# Patient Record
Sex: FEMALE | Race: WHITE | NOT HISPANIC OR LATINO | Employment: OTHER | URBAN - METROPOLITAN AREA
[De-identification: names, ages, dates, MRNs, and addresses within clinical notes are randomized per-mention and may not be internally consistent; named-entity substitution may affect disease eponyms.]

---

## 2023-04-26 LAB — HEPATITIS B VIRUS SURFACE AG PRESENCE IN SERUM: NONREACTIVE

## 2023-04-28 LAB
NIL(NEG) CONTROL SPOT COUNT: NORMAL
PANEL A SPOT COUNT: 0
PANEL B SPOT COUNT: 0
POS CONTROL SPOT COUNT: NORMAL
T-SPOT. TB INTERPRETATION: NEGATIVE

## 2023-05-01 LAB — TPMT ENZYME: 22.4

## 2023-05-05 LAB
EJ (GLYCYL-TRNA SYNTHETASE) ANTIBODY: NEGATIVE
FIBRILLARIN (U3 RNP) AB, IGG: NEGATIVE
JO-1 (HISTIDYL-TRNA SYNTHETASE) AB, IGG: 1 AU/ML (ref 0–40)
KU ANTIBODY: POSITIVE
MDA5 (CADM-140) AB: NEGATIVE
MI-2 (NUCLEAR HELICASE PROTEIN) ANTIBODY: NEGATIVE
MYOSITIS PANEL INTERPRETIVE DATA: ABNORMAL
NXP2 (NUCLEAR MATRIX PROTEIN-2) AB: NEGATIVE
OJ (ISOLEUCYL-TRNA SYNTHETASE) ANTIBODY: NEGATIVE
P155/140 ANTIBODY: NEGATIVE
PL-12 (ALANYL-TRNA SYNTHETASE) ANTIBODY: NEGATIVE
PL-7 (THREONYL-TRNA SYNTHETASE) ANTIBODY: NEGATIVE
PM/SCL 100 ANTIBODY, IGG: NEGATIVE
SAE1 (SUMO ACTIVATING ENZYME) AB: NEGATIVE
SMITH/RNP (ENA) AB, IGG: 2 UNITS (ref 0–19)
SRP (SIGNAL RECOGNITION PARTICLE) AB: NEGATIVE
SSA-52 (RO52) (ENA) ANTIBODY, IGG: 2 AU/ML (ref 0–40)
SSA-60 (RO60) (ENA) ANTIBODY, IGG: 0 AU/ML (ref 0–40)
TIF-1 GAMMA (155 KDA) AB: NEGATIVE

## 2023-05-17 ENCOUNTER — HOSPITAL ENCOUNTER (OUTPATIENT)
Dept: DATA CONVERSION | Facility: HOSPITAL | Age: 73
End: 2023-05-17
Attending: SURGERY | Admitting: SURGERY
Payer: COMMERCIAL

## 2023-05-17 DIAGNOSIS — M79.10 MYALGIA, UNSPECIFIED SITE: ICD-10-CM

## 2023-05-17 DIAGNOSIS — Z79.01 LONG TERM (CURRENT) USE OF ANTICOAGULANTS: ICD-10-CM

## 2023-05-17 DIAGNOSIS — G72.9 MYOPATHY, UNSPECIFIED: ICD-10-CM

## 2023-05-17 DIAGNOSIS — I48.20 CHRONIC ATRIAL FIBRILLATION, UNSPECIFIED (MULTI): ICD-10-CM

## 2023-05-17 DIAGNOSIS — G72.49 OTHER INFLAMMATORY AND IMMUNE MYOPATHIES, NOT ELSEWHERE CLASSIFIED: ICD-10-CM

## 2023-05-23 LAB
COMPLETE PATHOLOGY REPORT: NORMAL
CONVERTED CLINICAL DIAGNOSIS-HISTORY: NORMAL
CONVERTED FINAL DIAGNOSIS: NORMAL
CONVERTED FINAL REPORT PDF LINK TO COPY AND PASTE: NORMAL
CONVERTED GROSS DESCRIPTION: NORMAL

## 2023-06-27 LAB
ALANINE AMINOTRANSFERASE (SGPT) (U/L) IN SER/PLAS: 45 U/L (ref 7–45)
ALBUMIN (G/DL) IN SER/PLAS: 4.1 G/DL (ref 3.4–5)
ALKALINE PHOSPHATASE (U/L) IN SER/PLAS: 49 U/L (ref 33–136)
ANION GAP IN SER/PLAS: 12 MMOL/L (ref 10–20)
ASPARTATE AMINOTRANSFERASE (SGOT) (U/L) IN SER/PLAS: 65 U/L (ref 9–39)
BASOPHILS (10*3/UL) IN BLOOD BY AUTOMATED COUNT: 0.01 X10E9/L (ref 0–0.1)
BASOPHILS/100 LEUKOCYTES IN BLOOD BY AUTOMATED COUNT: 0.1 % (ref 0–2)
BILIRUBIN TOTAL (MG/DL) IN SER/PLAS: 0.4 MG/DL (ref 0–1.2)
C REACTIVE PROTEIN (MG/L) IN SER/PLAS: 0.57 MG/DL
CALCIDIOL (25 OH VITAMIN D3) (NG/ML) IN SER/PLAS: 40 NG/ML
CALCIUM (MG/DL) IN SER/PLAS: 9.5 MG/DL (ref 8.6–10.3)
CARBON DIOXIDE, TOTAL (MMOL/L) IN SER/PLAS: 30 MMOL/L (ref 21–32)
CHLORIDE (MMOL/L) IN SER/PLAS: 100 MMOL/L (ref 98–107)
COBALAMIN (VITAMIN B12) (PG/ML) IN SER/PLAS: 212 PG/ML (ref 211–911)
CREATINE KINASE (U/L) IN SER/PLAS: 1014 U/L (ref 0–215)
CREATININE (MG/DL) IN SER/PLAS: 0.47 MG/DL (ref 0.5–1.05)
ERYTHROCYTE DISTRIBUTION WIDTH (RATIO) BY AUTOMATED COUNT: 17.8 % (ref 11.5–14.5)
ERYTHROCYTE MEAN CORPUSCULAR HEMOGLOBIN CONCENTRATION (G/DL) BY AUTOMATED: 30 G/DL (ref 32–36)
ERYTHROCYTE MEAN CORPUSCULAR VOLUME (FL) BY AUTOMATED COUNT: 104 FL (ref 80–100)
ERYTHROCYTES (10*6/UL) IN BLOOD BY AUTOMATED COUNT: 4.24 X10E12/L (ref 4–5.2)
GFR FEMALE: >90 ML/MIN/1.73M2
GLUCOSE (MG/DL) IN SER/PLAS: 108 MG/DL (ref 74–99)
HEMATOCRIT (%) IN BLOOD BY AUTOMATED COUNT: 44 % (ref 36–46)
HEMOGLOBIN (G/DL) IN BLOOD: 13.2 G/DL (ref 12–16)
HEPATITIS C VIRUS AB PRESENCE IN SERUM: NONREACTIVE
IMMATURE GRANULOCYTES/100 LEUKOCYTES IN BLOOD BY AUTOMATED COUNT: 0.5 % (ref 0–0.9)
LEUKOCYTES (10*3/UL) IN BLOOD BY AUTOMATED COUNT: 10.1 X10E9/L (ref 4.4–11.3)
LYMPHOCYTES (10*3/UL) IN BLOOD BY AUTOMATED COUNT: 0.87 X10E9/L (ref 0.8–3)
LYMPHOCYTES/100 LEUKOCYTES IN BLOOD BY AUTOMATED COUNT: 8.7 % (ref 13–44)
MONOCYTES (10*3/UL) IN BLOOD BY AUTOMATED COUNT: 0.22 X10E9/L (ref 0.05–0.8)
MONOCYTES/100 LEUKOCYTES IN BLOOD BY AUTOMATED COUNT: 2.2 % (ref 2–10)
NEUTROPHILS (10*3/UL) IN BLOOD BY AUTOMATED COUNT: 8.9 X10E9/L (ref 1.6–5.5)
NEUTROPHILS/100 LEUKOCYTES IN BLOOD BY AUTOMATED COUNT: 88.5 % (ref 40–80)
PLATELETS (10*3/UL) IN BLOOD AUTOMATED COUNT: 393 X10E9/L (ref 150–450)
POTASSIUM (MMOL/L) IN SER/PLAS: 5 MMOL/L (ref 3.5–5.3)
PROTEIN TOTAL: 7.2 G/DL (ref 6.4–8.2)
SEDIMENTATION RATE, ERYTHROCYTE: 9 MM/H (ref 0–30)
SODIUM (MMOL/L) IN SER/PLAS: 137 MMOL/L (ref 136–145)
UREA NITROGEN (MG/DL) IN SER/PLAS: 16 MG/DL (ref 6–23)

## 2023-06-30 LAB — ALDOLASE SERUM: 22.3 U/L (ref 1.2–7.6)

## 2023-07-01 LAB
ALBUMIN ELP: 4.1 G/DL (ref 3.4–5)
ALPHA 1: 0.3 G/DL (ref 0.2–0.6)
ALPHA 2: 0.9 G/DL (ref 0.4–1.1)
BETA: 0.8 G/DL (ref 0.5–1.2)
GAMMA GLOBULIN: 1.1 G/DL (ref 0.5–1.4)
PATH REVIEW - SERUM IMMUNOFIXATION: NORMAL
PATH REVIEW-SERUM PROTEIN ELECTROPHORESIS: NORMAL
PROTEIN ELECTROPHORESIS INTERPRETATION: NORMAL
PROTEIN TOTAL: 7.2 G/DL (ref 6.4–8.2)
SERUM IMMUNOFIXATION INTERPRETATION: NORMAL

## 2023-07-03 LAB — VITAMIN B6: 116.3 NMOL/L (ref 20–125)

## 2023-07-04 LAB — VITAMIN B1, WHOLE BLOOD: 225 NMOL/L (ref 70–180)

## 2023-07-05 LAB
ANA PATTERN: ABNORMAL
ANA TITER: ABNORMAL
ANTI-CENTROMERE: <0.2 AI
ANTI-CHROMATIN: >8 AI
ANTI-DNA (DS): 1 IU/ML
ANTI-JO-1 IGG: <0.2 AI
ANTI-NUCLEAR ANTIBODY (ANA): POSITIVE
ANTI-RIBOSOMAL P: <0.2 AI
ANTI-RNP: <0.2 AI
ANTI-SCL-70: <0.2 AI
ANTI-SM/RNP: <0.2 AI
ANTI-SM: <0.2 AI
ANTI-SSA: 0.9 AI
ANTI-SSB: <0.2 AI

## 2023-07-12 LAB
ALANINE AMINOTRANSFERASE (SGPT) (U/L) IN SER/PLAS: 53 U/L (ref 7–45)
ALBUMIN (G/DL) IN SER/PLAS: 4 G/DL (ref 3.4–5)
ALKALINE PHOSPHATASE (U/L) IN SER/PLAS: 153 U/L (ref 33–136)
ANION GAP IN SER/PLAS: 13 MMOL/L (ref 10–20)
ASPARTATE AMINOTRANSFERASE (SGOT) (U/L) IN SER/PLAS: 50 U/L (ref 9–39)
BASOPHILS (10*3/UL) IN BLOOD BY AUTOMATED COUNT: 0.02 X10E9/L (ref 0–0.1)
BASOPHILS/100 LEUKOCYTES IN BLOOD BY AUTOMATED COUNT: 0.3 % (ref 0–2)
BILIRUBIN TOTAL (MG/DL) IN SER/PLAS: 0.4 MG/DL (ref 0–1.2)
C REACTIVE PROTEIN (MG/L) IN SER/PLAS: 0.36 MG/DL
CALCIUM (MG/DL) IN SER/PLAS: 9.5 MG/DL (ref 8.6–10.3)
CARBON DIOXIDE, TOTAL (MMOL/L) IN SER/PLAS: 31 MMOL/L (ref 21–32)
CHLORIDE (MMOL/L) IN SER/PLAS: 100 MMOL/L (ref 98–107)
CREATINE KINASE (U/L) IN SER/PLAS: 435 U/L (ref 0–215)
CREATININE (MG/DL) IN SER/PLAS: 0.48 MG/DL (ref 0.5–1.05)
ERYTHROCYTE DISTRIBUTION WIDTH (RATIO) BY AUTOMATED COUNT: 17.8 % (ref 11.5–14.5)
ERYTHROCYTE MEAN CORPUSCULAR HEMOGLOBIN CONCENTRATION (G/DL) BY AUTOMATED: 31.2 G/DL (ref 32–36)
ERYTHROCYTE MEAN CORPUSCULAR VOLUME (FL) BY AUTOMATED COUNT: 102 FL (ref 80–100)
ERYTHROCYTES (10*6/UL) IN BLOOD BY AUTOMATED COUNT: 4.2 X10E12/L (ref 4–5.2)
GFR FEMALE: >90 ML/MIN/1.73M2
GLUCOSE (MG/DL) IN SER/PLAS: 120 MG/DL (ref 74–99)
HEMATOCRIT (%) IN BLOOD BY AUTOMATED COUNT: 42.9 % (ref 36–46)
HEMOGLOBIN (G/DL) IN BLOOD: 13.4 G/DL (ref 12–16)
IMMATURE GRANULOCYTES/100 LEUKOCYTES IN BLOOD BY AUTOMATED COUNT: 0.5 % (ref 0–0.9)
LEUKOCYTES (10*3/UL) IN BLOOD BY AUTOMATED COUNT: 7.4 X10E9/L (ref 4.4–11.3)
LYMPHOCYTES (10*3/UL) IN BLOOD BY AUTOMATED COUNT: 1.02 X10E9/L (ref 0.8–3)
LYMPHOCYTES/100 LEUKOCYTES IN BLOOD BY AUTOMATED COUNT: 13.8 % (ref 13–44)
MONOCYTES (10*3/UL) IN BLOOD BY AUTOMATED COUNT: 0.11 X10E9/L (ref 0.05–0.8)
MONOCYTES/100 LEUKOCYTES IN BLOOD BY AUTOMATED COUNT: 1.5 % (ref 2–10)
NEUTROPHILS (10*3/UL) IN BLOOD BY AUTOMATED COUNT: 6.18 X10E9/L (ref 1.6–5.5)
NEUTROPHILS/100 LEUKOCYTES IN BLOOD BY AUTOMATED COUNT: 83.9 % (ref 40–80)
PLATELETS (10*3/UL) IN BLOOD AUTOMATED COUNT: 440 X10E9/L (ref 150–450)
POTASSIUM (MMOL/L) IN SER/PLAS: 4.7 MMOL/L (ref 3.5–5.3)
PROTEIN TOTAL: 7.1 G/DL (ref 6.4–8.2)
SODIUM (MMOL/L) IN SER/PLAS: 139 MMOL/L (ref 136–145)
UREA NITROGEN (MG/DL) IN SER/PLAS: 14 MG/DL (ref 6–23)

## 2023-07-25 LAB
ALANINE AMINOTRANSFERASE (SGPT) (U/L) IN SER/PLAS: 21 U/L (ref 7–45)
ALBUMIN (G/DL) IN SER/PLAS: 4.2 G/DL (ref 3.4–5)
ALKALINE PHOSPHATASE (U/L) IN SER/PLAS: 64 U/L (ref 33–136)
ASPARTATE AMINOTRANSFERASE (SGOT) (U/L) IN SER/PLAS: 32 U/L (ref 9–39)
BILIRUBIN DIRECT (MG/DL) IN SER/PLAS: 0.1 MG/DL (ref 0–0.3)
BILIRUBIN TOTAL (MG/DL) IN SER/PLAS: 0.5 MG/DL (ref 0–1.2)
CREATINE KINASE (U/L) IN SER/PLAS: 349 U/L (ref 0–215)
ERYTHROCYTE DISTRIBUTION WIDTH (RATIO) BY AUTOMATED COUNT: 17.2 % (ref 11.5–14.5)
ERYTHROCYTE MEAN CORPUSCULAR HEMOGLOBIN CONCENTRATION (G/DL) BY AUTOMATED: 30.9 G/DL (ref 32–36)
ERYTHROCYTE MEAN CORPUSCULAR VOLUME (FL) BY AUTOMATED COUNT: 104 FL (ref 80–100)
ERYTHROCYTES (10*6/UL) IN BLOOD BY AUTOMATED COUNT: 4.25 X10E12/L (ref 4–5.2)
HEMATOCRIT (%) IN BLOOD BY AUTOMATED COUNT: 44.4 % (ref 36–46)
HEMOGLOBIN (G/DL) IN BLOOD: 13.7 G/DL (ref 12–16)
LEUKOCYTES (10*3/UL) IN BLOOD BY AUTOMATED COUNT: 7.8 X10E9/L (ref 4.4–11.3)
PLATELETS (10*3/UL) IN BLOOD AUTOMATED COUNT: 297 X10E9/L (ref 150–450)
PROTEIN TOTAL: 7.1 G/DL (ref 6.4–8.2)

## 2023-07-27 LAB
ALDOLASE SERUM: 8.7 U/L (ref 1.2–7.6)
HEPATITIS B VIRUS CORE AB (PRESENCE) IN SER/PLAS BY IMM: NORMAL
HEPATITIS C VIRUS AB PRESENCE IN SERUM: NORMAL

## 2023-08-14 LAB
CREATINE KINASE (U/L) IN SER/PLAS: 141 U/L (ref 0–215)
HEPATITIS B VIRUS CORE AB (PRESENCE) IN SER/PLAS BY IMM: NONREACTIVE
HEPATITIS C VIRUS AB PRESENCE IN SERUM: NONREACTIVE

## 2023-09-05 LAB
ALANINE AMINOTRANSFERASE (SGPT) (U/L) IN SER/PLAS: 18 U/L (ref 7–45)
ALBUMIN (G/DL) IN SER/PLAS: 4.2 G/DL (ref 3.4–5)
ALKALINE PHOSPHATASE (U/L) IN SER/PLAS: 57 U/L (ref 33–136)
ASPARTATE AMINOTRANSFERASE (SGOT) (U/L) IN SER/PLAS: 29 U/L (ref 9–39)
BILIRUBIN DIRECT (MG/DL) IN SER/PLAS: 0.1 MG/DL (ref 0–0.3)
BILIRUBIN TOTAL (MG/DL) IN SER/PLAS: 0.5 MG/DL (ref 0–1.2)
CREATINE KINASE (U/L) IN SER/PLAS: 78 U/L (ref 0–215)
CREATININE (MG/DL) IN SER/PLAS: 0.6 MG/DL (ref 0.5–1.05)
ERYTHROCYTE DISTRIBUTION WIDTH (RATIO) BY AUTOMATED COUNT: 15.9 % (ref 11.5–14.5)
ERYTHROCYTE MEAN CORPUSCULAR HEMOGLOBIN CONCENTRATION (G/DL) BY AUTOMATED: 31.6 G/DL (ref 32–36)
ERYTHROCYTE MEAN CORPUSCULAR VOLUME (FL) BY AUTOMATED COUNT: 105 FL (ref 80–100)
ERYTHROCYTES (10*6/UL) IN BLOOD BY AUTOMATED COUNT: 4.65 X10E12/L (ref 4–5.2)
GFR FEMALE: >90 ML/MIN/1.73M2
HEMATOCRIT (%) IN BLOOD BY AUTOMATED COUNT: 48.8 % (ref 36–46)
HEMOGLOBIN (G/DL) IN BLOOD: 15.4 G/DL (ref 12–16)
LEUKOCYTES (10*3/UL) IN BLOOD BY AUTOMATED COUNT: 8.1 X10E9/L (ref 4.4–11.3)
PLATELETS (10*3/UL) IN BLOOD AUTOMATED COUNT: 307 X10E9/L (ref 150–450)
PROTEIN TOTAL: 7 G/DL (ref 6.4–8.2)
UREA NITROGEN (MG/DL) IN SER/PLAS: 17 MG/DL (ref 6–23)

## 2023-09-07 VITALS — HEIGHT: 63 IN | BODY MASS INDEX: 24.22 KG/M2 | WEIGHT: 136.69 LBS

## 2023-09-07 LAB — ALDOLASE SERUM: 5 U/L (ref 1.2–7.6)

## 2023-09-16 PROBLEM — M79.10 MYALGIA, UNSPECIFIED SITE: Status: ACTIVE | Noted: 2023-09-16

## 2023-09-16 PROBLEM — R76.8 POSITIVE LYME DISEASE SEROLOGY: Status: ACTIVE | Noted: 2023-09-16

## 2023-09-16 PROBLEM — Z79.899 LONG-TERM USE OF HIGH-RISK MEDICATION: Status: ACTIVE | Noted: 2023-09-16

## 2023-09-16 PROBLEM — R60.9 EDEMA: Status: ACTIVE | Noted: 2023-09-16

## 2023-09-16 PROBLEM — E55.9 MILD VITAMIN D DEFICIENCY: Status: ACTIVE | Noted: 2023-09-16

## 2023-09-16 PROBLEM — G72.9 MYOPATHY: Status: ACTIVE | Noted: 2023-09-16

## 2023-09-16 PROBLEM — B02.9 HERPES ZOSTER WITHOUT COMPLICATION: Status: ACTIVE | Noted: 2023-09-16

## 2023-09-16 PROBLEM — M13.0 POLYARTHRITIS: Status: ACTIVE | Noted: 2023-09-16

## 2023-09-16 PROBLEM — M25.641 STIFFNESS OF JOINTS OF BOTH HANDS: Status: ACTIVE | Noted: 2023-09-16

## 2023-09-16 PROBLEM — M16.9 OSTEOARTHRITIS OF HIP: Status: ACTIVE | Noted: 2023-09-16

## 2023-09-16 PROBLEM — J84.10 LUNG FIBROSIS (MULTI): Status: ACTIVE | Noted: 2023-09-16

## 2023-09-16 PROBLEM — Z79.52 LONG TERM (CURRENT) USE OF SYSTEMIC STEROIDS: Status: ACTIVE | Noted: 2023-09-16

## 2023-09-16 PROBLEM — R79.89 ABNORMAL TSH: Status: ACTIVE | Noted: 2023-09-16

## 2023-09-16 PROBLEM — M19.019 ARTHRITIS OF SHOULDER: Status: ACTIVE | Noted: 2023-09-16

## 2023-09-16 PROBLEM — M25.642 STIFFNESS OF JOINTS OF BOTH HANDS: Status: ACTIVE | Noted: 2023-09-16

## 2023-09-16 PROBLEM — M35.1 MCTD (MIXED CONNECTIVE TISSUE DISEASE) (MULTI): Status: ACTIVE | Noted: 2023-09-16

## 2023-09-16 RX ORDER — APIXABAN 5 MG/1
1 TABLET, FILM COATED ORAL 2 TIMES DAILY
COMMUNITY
Start: 2023-03-13

## 2023-09-16 RX ORDER — AMIODARONE HYDROCHLORIDE 100 MG/1
TABLET ORAL
COMMUNITY
Start: 2022-10-19 | End: 2023-11-08 | Stop reason: WASHOUT

## 2023-09-16 RX ORDER — BISOPROLOL FUMARATE 5 MG/1
1.5 TABLET, FILM COATED ORAL NIGHTLY
COMMUNITY
Start: 2023-03-23

## 2023-09-16 RX ORDER — AMIODARONE HYDROCHLORIDE 200 MG/1
TABLET ORAL
COMMUNITY
Start: 2022-11-14 | End: 2023-11-08 | Stop reason: WASHOUT

## 2023-09-16 RX ORDER — AZATHIOPRINE 50 MG/1
1 TABLET ORAL 3 TIMES DAILY
COMMUNITY
End: 2023-10-17 | Stop reason: SDUPTHER

## 2023-09-16 RX ORDER — ACETAMINOPHEN 500 MG
1 TABLET ORAL DAILY
COMMUNITY

## 2023-09-30 NOTE — H&P
History & Physical Reviewed:   I have reviewed the History and Physical dated:  01-May-2023   History and Physical reviewed and relevant findings noted. Patient examined to review pertinent physical  findings.: No significant changes   Home Medications Reviewed: no changes noted   Allergies Reviewed: no changes noted       ERAS (Enhanced Recovery After Surgery):  ·  ERAS Patient: no     Consent:   COVID-19 Consent:  ·  COVID-19 Risk Consent Surgeon has reviewed key risks related to the risk of zee COVID-19 and if they contract COVID-19 what the risks are.       Electronic Signatures:  Crow Thomas)  (Signed 17-May-2023 13:04)   Authored: History & Physical Reviewed, ERAS, Consent,  Note Completion      Last Updated: 17-May-2023 13:04 by Crow Thomas)

## 2023-10-02 NOTE — OP NOTE
Post Operative Note:     Post-Procedure Diagnosis: Myositis   Procedure: 1.  Left deltoid and left rectus femoris  muscle biopsy  2.   3.   4.   5.   Surgeon: Dr. Thomas   Resident/Fellow/Other Assistant: None   Estimated Blood Loss (mL): none   Specimen: yes   Findings: As above     Attestation:   Note Completion:  Attending Attestation I performed the procedure without a resident         Electronic Signatures:  Crow Thomas)  (Signed 17-May-2023 14:11)   Authored: Post Operative Note, Note Completion      Last Updated: 17-May-2023 14:11 by Crow Thomas)

## 2023-10-04 ENCOUNTER — LAB (OUTPATIENT)
Dept: LAB | Facility: LAB | Age: 73
End: 2023-10-04
Payer: MEDICARE

## 2023-10-04 DIAGNOSIS — Z79.899 OTHER LONG TERM (CURRENT) DRUG THERAPY: Primary | ICD-10-CM

## 2023-10-04 LAB
ALBUMIN SERPL BCP-MCNC: 4.1 G/DL (ref 3.4–5)
ALP SERPL-CCNC: 41 U/L (ref 33–136)
ALT SERPL W P-5'-P-CCNC: 25 U/L (ref 7–45)
AST SERPL W P-5'-P-CCNC: 28 U/L (ref 9–39)
BILIRUB DIRECT SERPL-MCNC: 0.1 MG/DL (ref 0–0.3)
BILIRUB SERPL-MCNC: 0.7 MG/DL (ref 0–1.2)
CK SERPL-CCNC: 97 U/L (ref 0–215)
PROT SERPL-MCNC: 6.8 G/DL (ref 6.4–8.2)
VIT B12 SERPL-MCNC: 668 PG/ML (ref 211–911)

## 2023-10-04 PROCEDURE — 82550 ASSAY OF CK (CPK): CPT

## 2023-10-04 PROCEDURE — 82607 VITAMIN B-12: CPT

## 2023-10-04 PROCEDURE — 36415 COLL VENOUS BLD VENIPUNCTURE: CPT

## 2023-10-04 PROCEDURE — 80076 HEPATIC FUNCTION PANEL: CPT

## 2023-10-04 PROCEDURE — 82085 ASSAY OF ALDOLASE: CPT

## 2023-10-06 LAB — ALDOLASE SERPL-CCNC: 5.7 U/L (ref 1.2–7.6)

## 2023-10-15 NOTE — PROGRESS NOTES
Subjective   Patient ID: [id number]   Nora Bynum is a 73 y.o. female with OA of right hip, inflammatory myopathy (anti-Ku positive), Afib (eliquis), and vitamin D deficiency, presenting for follow up.   Previously saw Dr. Jacome and Dr. Barbosa (last visit 5/30/23), Dr. Genao in 9/23.    Current IS:  - Prednisone 60mg 5/25/23, then tapering every 4 weeks by 10mg, currently on 10 mg daily  - Imuran 50mg TID    HPI  The patient is reporting that her pains have started again with further tapering of the prednisone. She can't pinpoint to the type of pains or where it is located and can't quantify it. She says it is painful but not as bad as it was before  Her last dose before the current one was 12.5 mg and she was being told to taper by 2.5 mg every few weeks  Reports that her weakness has improved significantly overall with steroids   She still reports right hip bothers her since she can't lift her right leg up to reach her toenails, she was seeing ortho, but no plans for surgery now  She reports that she got the blood tests done when she was still on the 12.5 mg dose of pred when she wasn't having an sx (10/4/2023)  No morning stiffness, no painful joints or swelling   + Bluish/greenish discoloration of her fingertips with cold  + Right eye dryness, feels like there is an eye lash between her eyeball and eyelid   No rashes, alopecia or photosensitivity  No oral or nasal ulcers  No episodes of eye inflammation  No chest pain, cough or dyspnea  No GI/ sx   No infections  Compliant with meds  No side effects reported, would like to get off the prednisone soon     Previously discussed with Dr. Genao switching to MMF given CT chest findings of possible ILD. Pt prefers not switching therapy at this time. She is aware we will plan to do repeat HRCT in 6-8 months to monitor improvement, around 2/2024      ROS  As per HPI     Rheum hx (Recall from Dr. Genao's notes):  Previously saw Dr. Jacome at [facility] since 2019.  Nml CK, RF 16, ESR 47. Then saw Dr. Gomes and Elan (12/2019 GLADYS > 1:1280, negative JUSTIN, nml C3/C4, RF/CCP negative)   Sjogrens, SSA+ , Raynauds, dry mouth since 2019 June 2022, Prednisone 20mg daily 6/25/23 by Dr. Pierson for possible PMR (also hx of elevated CK up to 1411 previously), followed up with Dr. Jacome 4/2023: fatigue, muscle weakness in shoulder > hip, CK 1649, Raynauds - concern for inflammatory myopathy. EMG done by Dr. Thakur (Neurology) and recommended for muscle biopsy. She underwent MM bx 5/17/23 for Left deltoid and left rectus femoris. Pt was called by Dr. 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      She followed up with Alphonso Stanton on 5/30/23: On Pred 60mg daily and Azathioprine 150mg daily. Muscle pain improved, steorid taper by 10mg in 4 weeks, then 50mg X3 weeks, then 40mg X2 weeks. Continue Aza 150mg daily.      Pt's initial presenting sx were bilateral shoulder and leg stiffness, weakness, UE >LE. The arm weakness and limitations in activity, difficulty doing overhead activity, started December 2022/ Jan 2023 with progressive weakness. Weakness in hip girdle.   90% improvement with steroids and AZA.     Associated/ROS symptoms:   + dry mouth, dry eyes, started in the more recent years  Denies fever, or current unintentional weight loss (reports some weight loss intentionally with intermittent fasting but overall has had ~100 lb weight loss over the last few years)  Denies hx of uveitis (gets eye exam annually)  Denies photosensitive rash  No hx of blood clots  Denies current difficulty swallowing.   Denies hx of seizure/CVA. + A fib (Dr. Pritchett/[place] [facility]) , has had Echo. On Dofetilide/Eliquis for A fib  Ct chest abd/pelvis was ordered for malignancy workup. She had [facility] chest [facility] at [facility]. CT abd/pelvis not done yet.      FMH of autoimmune disease: Unsure  PSH: Plans for hip replacement with Dr. Killian due to OA however due to  inflammatory myopathy, unable to get this done currently.   SocHX: Never smoker.     Patient Active Problem List   Diagnosis    Abnormal TSH    Edema    Herpes zoster without complication    Mild vitamin D deficiency    Myalgia, unspecified site    Myopathy    Osteoarthritis of hip    Polyarthritis    Positive Lyme disease serology    Stiffness of joints of both hands    Arthritis of shoulder    MCTD (mixed connective tissue disease) (CMS/HCC)    Lung fibrosis (CMS/HCC)    Long term (current) use of systemic steroids    Long-term use of high-risk medication        Past Medical History:   Diagnosis Date    Other specified health status     No pertinent past medical history        Past Surgical History:   Procedure Laterality Date    OTHER SURGICAL HISTORY  06/23/2020    Cyst excision        Social History     Socioeconomic History    Marital status: Single     Spouse name: Not on file    Number of children: Not on file    Years of education: Not on file    Highest education level: Not on file   Occupational History    Not on file   Tobacco Use    Smoking status: Not on file    Smokeless tobacco: Not on file   Substance and Sexual Activity    Alcohol use: Not on file    Drug use: Not on file    Sexual activity: Not on file   Other Topics Concern    Not on file   Social History Narrative    Not on file     Social Determinants of Health     Financial Resource Strain: Not on file   Food Insecurity: Not on file   Transportation Needs: Not on file   Physical Activity: Not on file   Stress: Not on file   Social Connections: Not on file   Intimate Partner Violence: Not on file   Housing Stability: Not on file        No Known Allergies       Current Outpatient Medications:     amiodarone (Pacerone) 100 mg tablet, , Disp: , Rfl:     amiodarone (Pacerone) 200 mg tablet, , Disp: , Rfl:     bisoprolol (Zebeta) 5 mg tablet, Take 1.5 tablets (7.5 mg) by mouth once daily at bedtime., Disp: , Rfl:     cholecalciferol (Vitamin D3) 50  mcg (2,000 unit) capsule, Take 1 capsule (2,000 Units) by mouth once daily., Disp: , Rfl:     Eliquis 5 mg tablet, Take 1 tablet (5 mg) by mouth 2 times a day., Disp: , Rfl:     magnesium 30 mg tablet, Take 1 tablet (30 mg) by mouth once daily., Disp: , Rfl:     vit B complex 100 combo no.2 (B-100 COMPLEX ORAL), Take 1 capsule by mouth once daily., Disp: , Rfl:     azaTHIOprine (Imuran) 50 mg tablet, Take 1 tablet (50 mg) by mouth 3 times a day., Disp: 180 tablet, Rfl: 1    predniSONE (Deltasone) 2.5 mg tablet, Take 1 tablet (2.5 mg) by mouth once daily., Disp: 60 tablet, Rfl: 1    predniSONE 5 mg tablet,delayed release (DR/EC), Take 1 tablet by mouth once daily., Disp: 90 tablet, Rfl: 1       Objective     Visit Vitals  /72 (BP Location: Left arm, Patient Position: Sitting)      Physical Exam  General: AAOx3, Cooperative  Head: normocephalic, atraumatic, no hair loss   Eyes: EOMI, conjunctiva clear, sclera white, anicteric, puffy lower eyelids   Neuro: CN II-XII grossly intact, no focal deficit, motor power, arms 4/5, lower limbs 4/5 but R is stronger than the left   Skin: No rashes, ulcers or photosensitive areas, bilateral LL varicose veins   MSK: Upper Extremities:  Hand/Fingers: No erythema, edema, tenderness or warmth at DIP, PIP, or MCP joints, FROM grossly. Good hand . No nodules. No deformities   Wrists: No erythema, edema, warmth or tenderness at wrist, FROM grossly  Elbows: No tenderness, edema, erythema or warmth at elbows, FROM grossly. No nodules   Shoulders: No edema, erythema, tenderness or warmth at shoulders. FROM  Lower Extremities:   Hips: No obvious deformities. No joint tenderness. No trochanteric bursae TTP  Knees: No tenderness, deformities, edema, rashes, or warmth, normal ROM grossly. No crepitus, no pes anserine bursa TTP   Ankles, feet: No deformities, tenderness, edema, erythema, ulceration, or warmth at the ankle or MTP/IP joints, normal ROM grossly  Spine: No spinal  tenderness to palpation. No SI joint tenderness       Lab Results   Component Value Date    WBC 8.1 09/05/2023    HGB 15.4 09/05/2023    HCT 48.8 (H) 09/05/2023     (H) 09/05/2023     09/05/2023        Chemistry    Lab Results   Component Value Date/Time     07/12/2023 1211    K 4.7 07/12/2023 1211     07/12/2023 1211    CO2 31 07/12/2023 1211    BUN 17 09/05/2023 1027    CREATININE 0.60 09/05/2023 1027    Lab Results   Component Value Date/Time    CALCIUM 9.5 07/12/2023 1211    ALKPHOS 41 10/04/2023 1223    AST 28 10/04/2023 1223    ALT 25 10/04/2023 1223    BILITOT 0.7 10/04/2023 1223        Lab Results   Component Value Date    CRP 0.36 07/12/2023      Lab Results   Component Value Date    SEDRATE 9 06/27/2023      Lab Results   Component Value Date    HEPBCAB NONREACTIVE 08/14/2023    HEPCAB NONREACTIVE 08/14/2023      Lab Results   Component Value Date    ALT 25 10/04/2023    AST 28 10/04/2023    ALKPHOS 41 10/04/2023    BILITOT 0.7 10/04/2023         === 04/26/23 ===  XR SHOULDER 2+ VIEWS BILATERAL  - Impression -  Mild degenerative changes bilateral shoulders.         CT chest 6/2023:  1.  Mild subpleural reticulation and minimal subpleural ground glass   opacities most significant in the lower lobes bilaterally.  Suggest a   degree of fibrosis but unchanged from 11/12/2021.     2.  0.3 cm nodule in the left lower lobe, new from prior study.  May be   infectious or inflammatory recommend follow-up in 6-12 months.     CT abd pelvis 7/23:  IMPRESSION:  1. No abdominopelvic lymphadenopathy.  2. Bilateral subpleural predominant reticular and ground-glass opacities within the bilateral lung dependent regions, likely  secondary to atelectasis, an underlying component of fibrosis cannotbe excluded.  3. Patulous distal esophagus with layering fluid, most compatible with patient's history of Sjogren syndrome.  4. Wedge-shaped low-density defects within the medial aspect of the spleen, likely  reflecting chronic splenic infarct.  5. There is an indeterminate 5 mm low-density lesion within the pancreatic body. Recommend further evaluation with MRI pancreas and  MRCP.    MRI abdomen 7/23 pertinent findings:  -Pancreas: Pancreas is normal in precontrast T1 signal intensity with no solid masses or main pancreatic duct dilatation.   -Multiple unilocular cystic pancreatic lesions communicate with the main pancreatic duct are noted for example a 1.3 cm cystic structure in the   pancreatic body (3:37). 3 mm cystic lesion at the junction of the pancreatic body and tail (3:36). No worrisome imaging features.   -Liver: 1 cm right hepatic lobe cyst. No enhancing mass.   -Spleen: Wedge-shaped area of T2 hypointense signal in the spleen may be related to prior trauma.   -Kidneys: 8 mm thinly septated cyst interpolar right kidney.      Biopsy of left deltoid and rectus femoris muscle 5/23:  INFLAMMATORY MYOPATHY    Note: Histopathologic features are qualitatively similar within the biopsy  specimens, however, they are quantitatively more severe within the rectus  femoris muscle.  The biopsies demonstrate both endomysial and  perimysial/vascular inflammation composed predominantly of CD3 positive T cells, accompanied by muscle fiber necrosis with regeneration (highlighted with antibodies to P62, CD68, and CD56).  Trichrome staining demonstrates no ragged red fibers or rimmed vacuoles, however, it highlights perimysial fibrosis in the rectus femoris muscle.  Myosin heavy chain immunostains show no fiber type grouping.  Cytochrome oxidase staining is within normal limits, and Congo red stain for amyloid is negative.       Assessment/Plan:  This is a 73 Y F with inflammatory myopathy and OA of hip, presents for follow up on myositis.      Labs:  4/13/23 GLADYS+, SSA 1.0, chrom >8. SPEP no definitive M protein. Aldolase elevated at 27.3 U/L, CK 1649  4/26/23 Extended myositis panel shows + Ku . TPMT 22.4 EU, Tspot TB negative.  HBsAg Negative  5/30/23 ESR 39, CRP <0.4 mg/dL  6/27/23 CK 1014 --> 435 --> 349 --> 141 --> 78 (9/23)   Aldolase 22.3 --> 8.7 --> 5.0 (9/23)  10/23 CPK 97, aldolase 5.7, CBC shows elevated hematocrit and MCV    # Inflammatory myopathy, Extended myositis panel +Ku, MM biopsy CK and aldolase normalized, sx recurring mildly with tapering of pred to 10 mg.   # MCTD: inflammatory myopathy, Sjogrens (SSA+, GLADYS +, sicca symptoms), Chr + , Raynauds  # Long term systemic steroids  # OP: Repeat DEXA done at [facility] (7/6/23- reviewed results- pt has osteoporosis worse T score -2.8, on vitamin D and calcium, does not want to add therapies now, would like to have better nutrition first, she was losing weight before and couldn't take her vitamins - briefly discussed BP     - Keep prednisone at 10 mg now, will decide based on labs and will message the patient, if normal will keep on 10 and taper by 1 mg. If elevated, will go back up to 12.5 mg and do slower tapers with 1 mg every 4 weks   - Labs on f/u: CK, aldolase, CBC, CMP, ESR, CRP today and with next rodrigo plus vitamin D   - Continue Azathioprine 150mg daily  - Although anti Ku does not carry a significant associated risk of malignancy- age related cancer screening encouraged. Pt declines Mammogram. Reports no prior colonoscopy but does annual FOBT. SPEP no monoclonal gammopathy  - Repeat CT chest/HRCT in 2/2024. She prefers not to switch to MMF from Imuran (although better data with ILD- pt aware). Baseline PFTs/pulm referral will need to be discussed on results of follow up CT  - Refer to physical therapy for muscle strengthening   - Saw surgery for pancreas and recommended monitoring   - She will likely benefit from baseline ECHO as none recently but will wait for now (no current sx)  - Has had prevnar, shingles and covid vaccinations  - Vitamin D 1000 international units  daily     RTC in 2 months     Plan, including risks and benefits, was discussed with the patient, informed  on how to reach us.     To schedule an appointment, call [phone]  To reach the rheumatology office, call [phone]    Vikki Duckworth MD   Division of Rheumatology  [facility]      Addendum:  CPK, aldolase, CMP, ESR, CRP wnl      I messaged the patient and informed her of the following:  - Continue on Prednisone 10 mg for 4 weeks then taper to 9 mg daily for 4 weeks which is till the next follow up  - RTC in 2 months    10/20/23 at 10:02 AM - Vikki Duckworth MD

## 2023-10-17 ENCOUNTER — OFFICE VISIT (OUTPATIENT)
Dept: RHEUMATOLOGY | Facility: CLINIC | Age: 73
End: 2023-10-17
Payer: MEDICARE

## 2023-10-17 ENCOUNTER — LAB (OUTPATIENT)
Dept: LAB | Facility: LAB | Age: 73
End: 2023-10-17
Payer: MEDICARE

## 2023-10-17 VITALS
HEIGHT: 63 IN | SYSTOLIC BLOOD PRESSURE: 136 MMHG | WEIGHT: 178 LBS | DIASTOLIC BLOOD PRESSURE: 72 MMHG | BODY MASS INDEX: 31.54 KG/M2

## 2023-10-17 DIAGNOSIS — G72.49 IDIOPATHIC INFLAMMATORY MYOPATHY: Primary | ICD-10-CM

## 2023-10-17 DIAGNOSIS — M16.11 PRIMARY OSTEOARTHRITIS OF RIGHT HIP: ICD-10-CM

## 2023-10-17 DIAGNOSIS — G72.49 IDIOPATHIC INFLAMMATORY MYOPATHY: ICD-10-CM

## 2023-10-17 DIAGNOSIS — M81.6 LOCALIZED OSTEOPOROSIS WITHOUT CURRENT PATHOLOGICAL FRACTURE: ICD-10-CM

## 2023-10-17 DIAGNOSIS — Z79.899 LONG-TERM USE OF HIGH-RISK MEDICATION: ICD-10-CM

## 2023-10-17 DIAGNOSIS — Z79.52 LONG TERM (CURRENT) USE OF SYSTEMIC STEROIDS: ICD-10-CM

## 2023-10-17 DIAGNOSIS — E55.9 VITAMIN D DEFICIENCY: ICD-10-CM

## 2023-10-17 DIAGNOSIS — M35.1 MCTD (MIXED CONNECTIVE TISSUE DISEASE) (MULTI): ICD-10-CM

## 2023-10-17 LAB
ALBUMIN SERPL BCP-MCNC: 4.3 G/DL (ref 3.4–5)
ALP SERPL-CCNC: 43 U/L (ref 33–136)
ALT SERPL W P-5'-P-CCNC: 24 U/L (ref 7–45)
ANION GAP SERPL CALC-SCNC: 15 MMOL/L (ref 10–20)
AST SERPL W P-5'-P-CCNC: 30 U/L (ref 9–39)
BASOPHILS # BLD AUTO: 0.04 X10*3/UL (ref 0–0.1)
BASOPHILS NFR BLD AUTO: 0.5 %
BILIRUB SERPL-MCNC: 0.7 MG/DL (ref 0–1.2)
BUN SERPL-MCNC: 18 MG/DL (ref 6–23)
CALCIUM SERPL-MCNC: 9.5 MG/DL (ref 8.6–10.3)
CHLORIDE SERPL-SCNC: 104 MMOL/L (ref 98–107)
CK SERPL-CCNC: 89 U/L (ref 0–215)
CO2 SERPL-SCNC: 27 MMOL/L (ref 21–32)
CREAT SERPL-MCNC: 0.71 MG/DL (ref 0.5–1.05)
CRP SERPL-MCNC: 0.35 MG/DL
EOSINOPHIL # BLD AUTO: 0.05 X10*3/UL (ref 0–0.4)
EOSINOPHIL NFR BLD AUTO: 0.6 %
ERYTHROCYTE [DISTWIDTH] IN BLOOD BY AUTOMATED COUNT: 14.8 % (ref 11.5–14.5)
ERYTHROCYTE [SEDIMENTATION RATE] IN BLOOD BY WESTERGREN METHOD: 8 MM/H (ref 0–30)
GFR SERPL CREATININE-BSD FRML MDRD: 89 ML/MIN/1.73M*2
GLUCOSE SERPL-MCNC: 90 MG/DL (ref 74–99)
HCT VFR BLD AUTO: 48.8 % (ref 36–46)
HGB BLD-MCNC: 15.4 G/DL (ref 12–16)
IMM GRANULOCYTES # BLD AUTO: 0.03 X10*3/UL (ref 0–0.5)
IMM GRANULOCYTES NFR BLD AUTO: 0.4 % (ref 0–0.9)
LYMPHOCYTES # BLD AUTO: 1.29 X10*3/UL (ref 0.8–3)
LYMPHOCYTES NFR BLD AUTO: 16.3 %
MCH RBC QN AUTO: 33.8 PG (ref 26–34)
MCHC RBC AUTO-ENTMCNC: 31.6 G/DL (ref 32–36)
MCV RBC AUTO: 107 FL (ref 80–100)
MONOCYTES # BLD AUTO: 0.48 X10*3/UL (ref 0.05–0.8)
MONOCYTES NFR BLD AUTO: 6.1 %
NEUTROPHILS # BLD AUTO: 6.04 X10*3/UL (ref 1.6–5.5)
NEUTROPHILS NFR BLD AUTO: 76.1 %
NRBC BLD-RTO: 0 /100 WBCS (ref 0–0)
PLATELET # BLD AUTO: 310 X10*3/UL (ref 150–450)
PMV BLD AUTO: 9.7 FL (ref 7.5–11.5)
POTASSIUM SERPL-SCNC: 4.4 MMOL/L (ref 3.5–5.3)
PROT SERPL-MCNC: 6.7 G/DL (ref 6.4–8.2)
RBC # BLD AUTO: 4.55 X10*6/UL (ref 4–5.2)
SODIUM SERPL-SCNC: 142 MMOL/L (ref 136–145)
WBC # BLD AUTO: 7.9 X10*3/UL (ref 4.4–11.3)

## 2023-10-17 PROCEDURE — 82085 ASSAY OF ALDOLASE: CPT

## 2023-10-17 PROCEDURE — 82550 ASSAY OF CK (CPK): CPT

## 2023-10-17 PROCEDURE — 80053 COMPREHEN METABOLIC PANEL: CPT

## 2023-10-17 PROCEDURE — 1159F MED LIST DOCD IN RCRD: CPT | Performed by: STUDENT IN AN ORGANIZED HEALTH CARE EDUCATION/TRAINING PROGRAM

## 2023-10-17 PROCEDURE — 36415 COLL VENOUS BLD VENIPUNCTURE: CPT

## 2023-10-17 PROCEDURE — 99215 OFFICE O/P EST HI 40 MIN: CPT | Performed by: STUDENT IN AN ORGANIZED HEALTH CARE EDUCATION/TRAINING PROGRAM

## 2023-10-17 PROCEDURE — 85025 COMPLETE CBC W/AUTO DIFF WBC: CPT

## 2023-10-17 PROCEDURE — 1126F AMNT PAIN NOTED NONE PRSNT: CPT | Performed by: STUDENT IN AN ORGANIZED HEALTH CARE EDUCATION/TRAINING PROGRAM

## 2023-10-17 PROCEDURE — 86140 C-REACTIVE PROTEIN: CPT

## 2023-10-17 PROCEDURE — 1160F RVW MEDS BY RX/DR IN RCRD: CPT | Performed by: STUDENT IN AN ORGANIZED HEALTH CARE EDUCATION/TRAINING PROGRAM

## 2023-10-17 PROCEDURE — 85652 RBC SED RATE AUTOMATED: CPT

## 2023-10-17 RX ORDER — PREDNISONE 2.5 MG/1
2.5 TABLET ORAL DAILY
Qty: 60 TABLET | Refills: 1 | Status: SHIPPED | OUTPATIENT
Start: 2023-10-17 | End: 2024-02-14

## 2023-10-17 RX ORDER — AZATHIOPRINE 50 MG/1
50 TABLET ORAL 3 TIMES DAILY
Qty: 180 TABLET | Refills: 1 | Status: SHIPPED
Start: 2023-10-17 | End: 2023-11-22 | Stop reason: ALTCHOICE

## 2023-10-17 RX ORDER — PREDNISONE 10 MG/1
10 TABLET ORAL DAILY
Qty: 60 TABLET | Refills: 2 | Status: SHIPPED
Start: 2023-10-17 | End: 2023-10-17 | Stop reason: ALTCHOICE

## 2023-10-17 RX ORDER — PREDNISONE 10 MG/1
10 TABLET ORAL DAILY
COMMUNITY
End: 2023-10-17 | Stop reason: SDUPTHER

## 2023-10-17 RX ORDER — PREDNISONE 5 MG/1
5 TABLET ORAL DAILY
Qty: 60 TABLET | Refills: 0 | Status: SHIPPED | OUTPATIENT
Start: 2023-10-17 | End: 2023-11-22 | Stop reason: SDUPTHER

## 2023-10-19 ENCOUNTER — TELEPHONE (OUTPATIENT)
Dept: RHEUMATOLOGY | Facility: CLINIC | Age: 73
End: 2023-10-19

## 2023-10-19 DIAGNOSIS — M35.1 MCTD (MIXED CONNECTIVE TISSUE DISEASE) (MULTI): Primary | ICD-10-CM

## 2023-10-19 LAB — ALDOLASE SERPL-CCNC: 5.6 U/L (ref 1.2–7.6)

## 2023-10-19 RX ORDER — PREDNISONE 1 MG/1
1 TABLET ORAL DAILY
Qty: 60 TABLET | Refills: 2 | Status: SHIPPED | OUTPATIENT
Start: 2023-10-19 | End: 2023-11-06 | Stop reason: SDUPTHER

## 2023-10-19 NOTE — TELEPHONE ENCOUNTER
Patient states that she saw her lab results on MyChart and wants to know how she is to continue tapering Prednisone.  Also states that she will need rx for Prednisone 1mg called to pharmacy.

## 2023-10-23 PROBLEM — E66.811 OBESITY, CLASS I, BMI 30-34.9: Status: ACTIVE | Noted: 2022-04-13

## 2023-10-23 PROBLEM — U07.1 PNEUMONIA DUE TO COVID-19 VIRUS: Status: ACTIVE | Noted: 2021-10-01

## 2023-10-23 PROBLEM — M25.641 STIFFNESS OF RIGHT HAND JOINT: Status: ACTIVE | Noted: 2020-07-07

## 2023-10-23 PROBLEM — G89.29 CHRONIC RIGHT SHOULDER PAIN: Status: ACTIVE | Noted: 2020-07-07

## 2023-10-23 PROBLEM — M25.611 DECREASED RANGE OF MOTION OF RIGHT SHOULDER: Status: ACTIVE | Noted: 2020-01-17

## 2023-10-23 PROBLEM — I48.0 PAROXYSMAL ATRIAL FIBRILLATION (MULTI): Status: ACTIVE | Noted: 2021-11-01

## 2023-10-23 PROBLEM — R76.8 THYROID ANTIBODY POSITIVE: Status: ACTIVE | Noted: 2017-09-11

## 2023-10-23 PROBLEM — M79.641 BILATERAL HAND PAIN: Status: ACTIVE | Noted: 2020-07-07

## 2023-10-23 PROBLEM — E55.9 VITAMIN D DEFICIENCY: Status: ACTIVE | Noted: 2017-09-11

## 2023-10-23 PROBLEM — H25.13 NUCLEAR SENILE CATARACT OF BOTH EYES: Status: ACTIVE | Noted: 2019-10-24

## 2023-10-23 PROBLEM — R74.8 HYPERCKEMIA: Status: ACTIVE | Noted: 2020-09-27

## 2023-10-23 PROBLEM — M25.50 POLYARTHRALGIA: Status: ACTIVE | Noted: 2021-11-12

## 2023-10-23 PROBLEM — R03.0 ELEVATED BLOOD PRESSURE READING WITHOUT DIAGNOSIS OF HYPERTENSION: Status: ACTIVE | Noted: 2017-09-11

## 2023-10-23 PROBLEM — K61.0 PERIANAL ABSCESS: Status: ACTIVE | Noted: 2018-09-28

## 2023-10-23 PROBLEM — E78.5 DYSLIPIDEMIA: Status: ACTIVE | Noted: 2017-09-13

## 2023-10-23 PROBLEM — E66.9 OBESITY, CLASS I, BMI 30-34.9: Status: ACTIVE | Noted: 2022-04-13

## 2023-10-23 PROBLEM — M25.511 CHRONIC RIGHT SHOULDER PAIN: Status: ACTIVE | Noted: 2020-07-07

## 2023-10-23 PROBLEM — R53.1 WEAKNESS GENERALIZED: Status: ACTIVE | Noted: 2021-11-12

## 2023-10-23 PROBLEM — M25.611 SHOULDER STIFFNESS, RIGHT: Status: ACTIVE | Noted: 2020-07-07

## 2023-10-23 PROBLEM — I51.9 SYSTOLIC DYSFUNCTION: Status: ACTIVE | Noted: 2021-11-01

## 2023-10-23 PROBLEM — J12.82 PNEUMONIA DUE TO COVID-19 VIRUS: Status: ACTIVE | Noted: 2021-10-01

## 2023-10-23 PROBLEM — M25.551 RIGHT HIP PAIN: Status: ACTIVE | Noted: 2017-09-11

## 2023-10-23 PROBLEM — M79.642 BILATERAL HAND PAIN: Status: ACTIVE | Noted: 2020-07-07

## 2023-10-23 PROBLEM — M17.0 OSTEOARTHRITIS OF BOTH KNEES: Status: ACTIVE | Noted: 2020-01-17

## 2023-10-23 RX ORDER — DOCOSAHEXAENOIC ACID/EPA 120-180 MG
CAPSULE ORAL
COMMUNITY

## 2023-10-23 RX ORDER — LANOLIN ALCOHOL/MO/W.PET/CERES
1000 CREAM (GRAM) TOPICAL DAILY
COMMUNITY
Start: 2023-07-27

## 2023-10-23 RX ORDER — ACETAMINOPHEN 500 MG
5 TABLET ORAL NIGHTLY
COMMUNITY
Start: 2019-07-29

## 2023-10-23 RX ORDER — DOFETILIDE 0.25 MG/1
250 CAPSULE ORAL 2 TIMES DAILY
COMMUNITY
Start: 2023-06-23

## 2023-10-23 RX ORDER — GABAPENTIN 300 MG/1
300 CAPSULE ORAL
COMMUNITY
Start: 2023-05-17 | End: 2023-11-08

## 2023-10-26 ENCOUNTER — OFFICE VISIT (OUTPATIENT)
Dept: NEUROLOGY | Facility: CLINIC | Age: 73
End: 2023-10-26
Payer: MEDICARE

## 2023-10-26 VITALS
BODY MASS INDEX: 31.04 KG/M2 | DIASTOLIC BLOOD PRESSURE: 71 MMHG | WEIGHT: 175.2 LBS | HEART RATE: 48 BPM | TEMPERATURE: 97.5 F | SYSTOLIC BLOOD PRESSURE: 147 MMHG | HEIGHT: 63 IN

## 2023-10-26 DIAGNOSIS — G72.9 MYOPATHY: Primary | ICD-10-CM

## 2023-10-26 PROCEDURE — 1159F MED LIST DOCD IN RCRD: CPT | Performed by: PSYCHIATRY & NEUROLOGY

## 2023-10-26 PROCEDURE — 1160F RVW MEDS BY RX/DR IN RCRD: CPT | Performed by: PSYCHIATRY & NEUROLOGY

## 2023-10-26 PROCEDURE — 1036F TOBACCO NON-USER: CPT | Performed by: PSYCHIATRY & NEUROLOGY

## 2023-10-26 PROCEDURE — 99213 OFFICE O/P EST LOW 20 MIN: CPT | Performed by: PSYCHIATRY & NEUROLOGY

## 2023-10-26 PROCEDURE — 1125F AMNT PAIN NOTED PAIN PRSNT: CPT | Performed by: PSYCHIATRY & NEUROLOGY

## 2023-10-26 ASSESSMENT — PATIENT HEALTH QUESTIONNAIRE - PHQ9
2. FEELING DOWN, DEPRESSED OR HOPELESS: NOT AT ALL
1. LITTLE INTEREST OR PLEASURE IN DOING THINGS: NOT AT ALL
SUM OF ALL RESPONSES TO PHQ9 QUESTIONS 1 AND 2: 0

## 2023-10-26 ASSESSMENT — PAIN SCALES - GENERAL: PAINLEVEL: 2

## 2023-10-26 NOTE — PROGRESS NOTES
"Subjective   Nora Bynum is a 73 y.o. female who comes in for follow up of inflammatory myositis.      She reports that she is doing much better. She is on a combination of prednisone and Imuran.  She is tapering the prednisone with some increase in symptoms in the arms.    She saw a new orthopedic surgeon and he didn't think she had enough pain to need a hip replacement. Most of her pain resolved with the prednisone.  She only has pain in the hip with external rotation. She wanted more mobility but they didn't    Her arms were so painful.       She was found to have a pancreatic cyst vs neoplasm. Per patient she is following at [facility] and plan is to repeat image in one year.     Most recent CK is 89 down from 1014 in June.   She is down to 10 mg of prednisone.      Review of Systems    Objective   /71 (BP Location: Right arm, Patient Position: Sitting, BP Cuff Size: Adult)   Pulse (!) 48   Temp 36.4 °C (97.5 °F)   Ht 1.6 m (5' 3\")   Wt 79.5 kg (175 lb 3.2 oz)   LMP  (LMP Unknown)   BMI 31.04 kg/m²   Neurological Exam  Physical Exam    Exam shows 5/5 strength throughout. She can bend over and pick something up off the floor and stand up out of a chair without pushing off with her arms.    Recent CK back to normal.         Assessment/Plan   Ms. Bynum is a 73 year old woman who was previously seen for myositis.  Pathology from muscle biopsy showed inflammatory myopathy with both endomysial and perimysial/vascular inflammation.  Being managed by rheumatology. Work up for malignancy showed a questionable lesion in the pancreas and she is being seen by general surgery at [facility] for follow up.  Also found to have anti-Ku antibodies and fibrosis in the lungs. Rheumatology recommended referral to pulmonology however she has not done that. I did encourage her to make it.    Agree with current management of prednisone and Imuran. She seems to be responding well. I would recommend yearly evaluation with dermatology " given skin cancer risk with Imuran.    Given the mixed connective tissue disease will defer to rheumatology to mange her medications.    She can follow up with neurology in 6 months.    Esperanza Thakur, DO

## 2023-10-26 NOTE — PATIENT INSTRUCTIONS
I still want you to follow with rheumatology.  I would call Dr. Jacome and see if she can continue to follow you.  I do think its reasonable for you to see pulmonology.  Follow up in 6 months.

## 2023-11-06 DIAGNOSIS — M35.1 MCTD (MIXED CONNECTIVE TISSUE DISEASE) (MULTI): ICD-10-CM

## 2023-11-06 RX ORDER — PREDNISONE 1 MG/1
1 TABLET ORAL DAILY
Qty: 60 TABLET | Refills: 2 | Status: SHIPPED | OUTPATIENT
Start: 2023-11-06 | End: 2023-11-10 | Stop reason: SDUPTHER

## 2023-11-08 ENCOUNTER — OFFICE VISIT (OUTPATIENT)
Dept: PULMONOLOGY | Facility: CLINIC | Age: 73
End: 2023-11-08
Payer: MEDICARE

## 2023-11-08 ENCOUNTER — LAB (OUTPATIENT)
Dept: LAB | Facility: LAB | Age: 73
End: 2023-11-08
Payer: MEDICARE

## 2023-11-08 VITALS
TEMPERATURE: 97.6 F | OXYGEN SATURATION: 97 % | HEIGHT: 63 IN | RESPIRATION RATE: 16 BRPM | SYSTOLIC BLOOD PRESSURE: 130 MMHG | HEART RATE: 57 BPM | WEIGHT: 175.6 LBS | DIASTOLIC BLOOD PRESSURE: 80 MMHG | BODY MASS INDEX: 31.11 KG/M2

## 2023-11-08 DIAGNOSIS — Q99.8 MYOSITIS ASSOCIATED ANTIBODY POSITIVE (HHS-HCC): ICD-10-CM

## 2023-11-08 DIAGNOSIS — J84.9 ILD (INTERSTITIAL LUNG DISEASE) (MULTI): Primary | ICD-10-CM

## 2023-11-08 DIAGNOSIS — J84.9 ILD (INTERSTITIAL LUNG DISEASE) (MULTI): ICD-10-CM

## 2023-11-08 DIAGNOSIS — K21.9 CHRONIC GERD: ICD-10-CM

## 2023-11-08 LAB
CK SERPL-CCNC: 105 U/L (ref 0–215)
CRP SERPL-MCNC: 0.19 MG/DL
ERYTHROCYTE [SEDIMENTATION RATE] IN BLOOD BY WESTERGREN METHOD: 13 MM/H (ref 0–30)

## 2023-11-08 PROCEDURE — 1036F TOBACCO NON-USER: CPT | Performed by: INTERNAL MEDICINE

## 2023-11-08 PROCEDURE — 1159F MED LIST DOCD IN RCRD: CPT | Performed by: INTERNAL MEDICINE

## 2023-11-08 PROCEDURE — 86140 C-REACTIVE PROTEIN: CPT

## 2023-11-08 PROCEDURE — 1126F AMNT PAIN NOTED NONE PRSNT: CPT | Performed by: INTERNAL MEDICINE

## 2023-11-08 PROCEDURE — 99205 OFFICE O/P NEW HI 60 MIN: CPT | Performed by: INTERNAL MEDICINE

## 2023-11-08 PROCEDURE — 82085 ASSAY OF ALDOLASE: CPT

## 2023-11-08 PROCEDURE — 1160F RVW MEDS BY RX/DR IN RCRD: CPT | Performed by: INTERNAL MEDICINE

## 2023-11-08 PROCEDURE — 85652 RBC SED RATE AUTOMATED: CPT

## 2023-11-08 PROCEDURE — 36415 COLL VENOUS BLD VENIPUNCTURE: CPT

## 2023-11-08 PROCEDURE — 82550 ASSAY OF CK (CPK): CPT

## 2023-11-08 ASSESSMENT — PATIENT HEALTH QUESTIONNAIRE - PHQ9
1. LITTLE INTEREST OR PLEASURE IN DOING THINGS: NOT AT ALL
SUM OF ALL RESPONSES TO PHQ9 QUESTIONS 1 AND 2: 0
2. FEELING DOWN, DEPRESSED OR HOPELESS: NOT AT ALL

## 2023-11-08 ASSESSMENT — COLUMBIA-SUICIDE SEVERITY RATING SCALE - C-SSRS
2. HAVE YOU ACTUALLY HAD ANY THOUGHTS OF KILLING YOURSELF?: NO
6. HAVE YOU EVER DONE ANYTHING, STARTED TO DO ANYTHING, OR PREPARED TO DO ANYTHING TO END YOUR LIFE?: NO
1. IN THE PAST MONTH, HAVE YOU WISHED YOU WERE DEAD OR WISHED YOU COULD GO TO SLEEP AND NOT WAKE UP?: NO

## 2023-11-08 ASSESSMENT — ENCOUNTER SYMPTOMS
OCCASIONAL FEELINGS OF UNSTEADINESS: 0
DEPRESSION: 0
LOSS OF SENSATION IN FEET: 0

## 2023-11-08 ASSESSMENT — PAIN SCALES - GENERAL: PAINLEVEL: 0-NO PAIN

## 2023-11-08 NOTE — PROGRESS NOTES
Pulmonary Consult    Request of Pulmonary Consult by rheumatology to evaluate Nora Bynum for interstitial lung disease.   I have independently interviewed and examined the patient in the office and reviewed available records.    Physician HPI (11/9/2023):  This a a 73 year old woman with diagnosis of  inflammatory myopathy (anti-Ku positive), Afib (eliquis), and osteoarthritis, Vitamin D deficiency, she was referred to Pulmonary clinic for concer of CTD-ILD  Her medical problems started from 2019 with severe muscle weakness and fatigue and pain of swelling of the fingers.  Her symptoms had been progressive with periods of regression on short course of steroids.  Her serological markers are positive for myositis markers antichromatin antinuclear antibody and antecubital are all positive also markedly elevated CK at 1000, She underwent MM bx 5/17/23 for Left deltoid and left rectus femoris  muscle biopsy that showed inflammatory myopathy with both endomysial and perimysial/vascular inflammation. Also found to have anti-Ku antibodies and fibrosis in the lungs. She was started on steroid 60 mg with very slow tapering down she is on 8.5 mg of prednisone and 50 mg 3 times daily of Imuran, her symptoms are stable .  She complains of minimal shortness of breath with activity but denies any dry or productive cough, no fever or chest pain  She denies any skin lesions rash., she complains of Foreign body sensation in the eye , and epigastric pain  No swelling of the joints but has right hip degenerative disease requiring hip replacement.  She has CT chest done in June 2023 that showed subpleural reticulation also shows lesion in the pancreas and she is being seen by general surgery at [facility] for follow up.    Immunization History:  Immunization History   Administered Date(s) Administered    Moderna COVID-19 vaccine, bivalent, blue cap/gray label *Check age/dose* 07/21/2023    Moderna SARS-CoV-2 Vaccination 07/29/2022     Pfizer Purple Cap SARS-CoV-2 01/28/2021, 02/18/2021, 12/20/2021    Pneumococcal conjugate vaccine, 20-valent (PREVNAR 20) 07/27/2023    Zoster vaccine, recombinant, adult (SHINGRIX) 06/03/2021, 06/04/2021, 09/16/2021       Family History:  Family History   Problem Relation Name Age of Onset    Kidney cancer Mother      Kidney disease Father      Kidney cancer Father         Social History:  Social History     Socioeconomic History    Marital status: Single     Spouse name: None    Number of children: None    Years of education: None    Highest education level: None   Occupational History    None   Tobacco Use    Smoking status: Never    Smokeless tobacco: Never   Substance and Sexual Activity    Alcohol use: Not Currently    Drug use: Never    Sexual activity: Defer   Other Topics Concern    None   Social History Narrative    None     Social Determinants of Health     Financial Resource Strain: Not on file   Food Insecurity: Not on file   Transportation Needs: Not on file   Physical Activity: Not on file   Stress: Not on file   Social Connections: Not on file   Intimate Partner Violence: Not on file   Housing Stability: Not on file       Current Medications:  Current Outpatient Medications   Medication Instructions    azaTHIOprine (IMURAN) 50 mg, oral, 3 times daily    bisoprolol (Zebeta) 5 mg tablet 1.5 tablets, oral, Nightly    cholecalciferol (Vitamin D3) 50 mcg (2,000 unit) capsule 1 capsule, oral, Daily    cyanocobalamin (VITAMIN B-12) 1,000 mcg, oral, Daily    dofetilide (TIKOSYN) 250 mcg, oral, 2 times daily    Eliquis 5 mg tablet 1 tablet, oral, 2 times daily    fish oil concentrate (Fish OiL) 120-180 mg capsule oral    magnesium 30 mg, oral, Daily    melatonin 5 mg, oral, Nightly    predniSONE (DELTASONE) 2.5 mg, oral, Daily    predniSONE (DELTASONE) 5 mg, oral, Daily    predniSONE (DELTASONE) 1 mg, oral, Daily    vit B complex 100 combo no.2 (B-100 COMPLEX ORAL) 1 capsule, oral, Daily        Drug  "Allergies/Intolerances:  No Known Allergies     Review of Systems:  Positive  dry mouth, (foreign body sensation and dry eyes,   Denies fever,  some weight loss intentionally   Denies hx of uveitis   Denies photosensitive rash.   No hx of blood clots. Denies current difficulty swallowing.   Denies hx of seizure/CVA.   History of A fib (Dr. Pritchett/[facility]) , has had Echo. On Dofetilide/Eliquis for A fib  Ct chest abd/pelvis was ordered for malignancy workup. She had CT chest [facility] at [facility]. CT abd/pelvis not done yet.     All other review of systems are negative and/or non-contributory.    Physical Examination:  /80 (BP Location: Right arm, Patient Position: Sitting, BP Cuff Size: Adult)   Pulse 57   Temp 36.4 °C (97.6 °F) (Temporal)   Resp 16   Ht 1.6 m (5' 3\")   Wt 79.7 kg (175 lb 9.6 oz)   LMP  (LMP Unknown)   SpO2 97%   BMI 31.11 kg/m²      General: ambulated independently; no acute distress; well-nourished; work of breathing was not increased; normal vocal character  HEENT: normocephalic; anicteric sclerae; conjunctivae not injected; swollen eye lids  nasal mucosa was unremarkable; oropharynx was clear without evidence of thrush; dentition was good.  Neck: supple; no lymphadenopathy or thyromegaly.  Chest: dry inspiratory crepitation of the right side  Cardiac: regular rhythm; no gallop or murmur.  Abdomen: soft; non-tender; non-distended; no hepatosplenomegaly.  Extremities: right hip arthritis  Psychiatric: did not appear depressed or anxious.       Latest Reference Range & Units 06/27/23 13:05 07/12/23 12:11 07/25/23 11:59 08/14/23 10:06 09/05/23 10:27 10/04/23 12:23 10/17/23 11:26 11/08/23 13:15   Creatine Kinase 0 - 215 U/L 1,014 (H) 435 (H) 349 (H) 141 78 97 89 105         Chest Radiograph     CHEST 2 VIEW 10/18/2021    Narrative  STUDY:  Chest Radiographs;  [10/18/2021 16:08]    INDICATION:  Cough for three weeks.    COMPARISON:  None Available.    ACCESSION " NUMBER(S):  [id number]    ORDERING CLINICIAN:  IZABELLA OZUNA DO    TECHNIQUE:  Frontal and lateral chest.    FINDINGS:    CARDIOMEDIASTINAL SILHOUETTE:  Cardiomediastinal silhouette is normal in size and configuration.    LUNGS:  Lungs are clear.    ABDOMEN:  No remarkable upper abdominal findings.    BONES:  No acute osseous changes.    Impression  No radiographic evidence of acute cardiopulmonary disease.      Signed by Adrian Mesa MD      Echocardiogram     No results found for this or any previous visit from the past 365 days.       Chest CT Scan       LOWER CHEST: Bilateral subpleural predominant reticular and  ground-glass opacities within the bilateral lung dependent regions..  The visualized distal esophagus is patulous with layering fluid.  Partially visualized cardiomegaly with biatrial enlargement.    Dilated esophagus and thin walled cyst:      Assessment and Plan / Recommendations:  Problem List Items Addressed This Visit    None  Visit Diagnoses       ILD (interstitial lung disease) (CMS/HCC)    -  Primary    Relevant Orders    Pulmonary function test    CT chest wo IV contrast    Creatine Kinase (Completed)    Aldolase    Sedimentation rate, automated (Completed)    C-reactive protein (Completed)    Myositis associated antibody positive        Relevant Orders    Pulmonary function test    CT chest wo IV contrast    Creatine Kinase (Completed)    Aldolase    Sedimentation rate, automated (Completed)    C-reactive protein (Completed)             -CTD- ILD: Connective tissue disease related ILD  CT images reviewed shows subpleural reticulation and thin-walled cysts   possibility of LIP , lymphocytic interstitial pneumonia versus NSIP nonspecific interstitial pneumonia  Minimal respiratory symptoms, oxygen saturation above 95.  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    -Patulous esophagus:  CT chest and abdomen shows wide open esophagus  High risk for reflux and microaspiration  Recommend pantoprazole 40 mg at night    -Sjogren disease:  Positive anti-SSA, and foreign body sensation eyes  Recommend annual eye examination    -Other medical problem:  A-fib: On Eliquis  Osteoarthritis of the hip      Freddy Duncan MD  11/08/2023

## 2023-11-09 RX ORDER — PANTOPRAZOLE SODIUM 40 MG/1
40 TABLET, DELAYED RELEASE ORAL DAILY
Qty: 30 TABLET | Refills: 11 | Status: SHIPPED
Start: 2023-11-09 | End: 2024-04-16 | Stop reason: ALTCHOICE

## 2023-11-10 ENCOUNTER — TELEPHONE (OUTPATIENT)
Dept: RHEUMATOLOGY | Facility: CLINIC | Age: 73
End: 2023-11-10

## 2023-11-10 DIAGNOSIS — M35.1 MCTD (MIXED CONNECTIVE TISSUE DISEASE) (MULTI): ICD-10-CM

## 2023-11-10 LAB — ALDOLASE SERPL-CCNC: 4.9 U/L (ref 1.2–7.6)

## 2023-11-10 RX ORDER — PREDNISONE 1 MG/1
4 TABLET ORAL DAILY
Qty: 60 TABLET | Refills: 2 | Status: SHIPPED | OUTPATIENT
Start: 2023-11-10 | End: 2024-02-27 | Stop reason: SDUPTHER

## 2023-11-10 NOTE — TELEPHONE ENCOUNTER
"Hi Dr. Duckworth:    Patient left message that she needs the Prednisone 1mg rx written differently and not to say \"take 1 daily\".  She says that one some days she takes as many as four and that would be 28 per week.     Thanks,  Stephany  "

## 2023-11-21 ENCOUNTER — EVALUATION (OUTPATIENT)
Dept: PHYSICAL THERAPY | Facility: CLINIC | Age: 73
End: 2023-11-21
Payer: MEDICARE

## 2023-11-21 DIAGNOSIS — G72.49 IDIOPATHIC INFLAMMATORY MYOPATHY: ICD-10-CM

## 2023-11-21 DIAGNOSIS — R53.1 WEAKNESS GENERALIZED: Primary | ICD-10-CM

## 2023-11-21 DIAGNOSIS — M25.511 CHRONIC PAIN OF BOTH SHOULDERS: ICD-10-CM

## 2023-11-21 DIAGNOSIS — G89.29 CHRONIC PAIN OF BOTH SHOULDERS: ICD-10-CM

## 2023-11-21 DIAGNOSIS — M25.551 BILATERAL HIP PAIN: ICD-10-CM

## 2023-11-21 DIAGNOSIS — M25.512 CHRONIC PAIN OF BOTH SHOULDERS: ICD-10-CM

## 2023-11-21 DIAGNOSIS — M25.552 BILATERAL HIP PAIN: ICD-10-CM

## 2023-11-21 PROCEDURE — 97110 THERAPEUTIC EXERCISES: CPT | Mod: GP

## 2023-11-21 PROCEDURE — 97162 PT EVAL MOD COMPLEX 30 MIN: CPT | Mod: GP

## 2023-11-21 ASSESSMENT — PAIN SCALES - GENERAL: PAINLEVEL_OUTOF10: 2

## 2023-11-21 ASSESSMENT — PAIN - FUNCTIONAL ASSESSMENT: PAIN_FUNCTIONAL_ASSESSMENT: 0-10

## 2023-11-21 NOTE — PROGRESS NOTES
Physical Therapy Evaluation and Treatment      Patient Name: Nora Bynum  MRN: [id number]  Today's Date: 11/21/2023  Time Calculation  Start Time: 1600  Stop Time: 1635  Time Calculation (min): 35 min  Visit Number: 1  Approved Visits: TBD  Auth required: yes  Medicare Certification Date Range: 11/21/2023 to 2/19/2024   Assessment:  PT Assessment  Rehab Prognosis: Good  Evaluation/Treatment Tolerance: Patient tolerated treatment well   Patient presents with chief complaint of B shoulder/hip pain and weakness that has been present for a number of years. Of note, patient has complex medical history and has seen multiple providers for this issue. Eventually received diagnosis of inflammatory myopathy and was started on chronic prednisone regimen, which helped quite a bit. Currently undergoing taper from steroid. continues to feel some pain and weakness of the proximal extremities, which is very limiting to her. In addition, she has known R hip OA that limits her, as well.  Patient demonstrates decreased shoulder/hip ROM, decreased shoulder/hip strength, decreased functional strength, impaired posture, and increased shoulder/hip pain, which limits her current functional ability. Patient would likely benefit from skilled outpatient PT in order to address the above deficits and return to PLOF.   Plan:  OP PT Plan  Treatment/Interventions: Cryotherapy, Education/ Instruction, Gait training, Hot pack, Manual therapy, Neuromuscular re-education, Self care/ home management, Taping techniques, Therapeutic activities, Therapeutic exercises  PT Plan: Skilled PT  PT Frequency: 1 time per week  Duration: 8-10 weeks  Certification Period Start Date: 11/21/23  Certification Period End Date: 02/19/24  Number of Treatments Authorized: TBD  Rehab Potential: Good  Plan of Care Agreement: Patient    Current Problem:   1. Weakness generalized  Follow Up In Physical Therapy      2. Idiopathic inflammatory myopathy  Referral to Physical  Therapy    Follow Up In Physical Therapy      3. Chronic pain of both shoulders  Follow Up In Physical Therapy      4. Bilateral hip pain  Follow Up In Physical Therapy          Subjective    General:  General  Reason for Referral: generalized weakness and proximal extremity pain  Referred By: Dr. Duckworth  Chief complaint: generalized weakness and proximal extremity pain that has been present for a few years now. Was diagnosed with inflammatory myopathy on chronic prednisone, which has helped quite a bit. Pain is much better at this time, but feels limited mobility and weakness, which affects daily activities. Was scheduled to have a R hip replacement, but had to put this off due to the myopathy. Felt that the pain was so intense initially and made it very challenging to do any movement/activity. Pain would frequently wake her up at night. Has seen several rheumatologists over the years for these issues. Patient lives in a senior living facility -- has a fitness center and is wanting to use this potentially long-term. Feels that her R hip is especially very stiff and unable to cut her toenails as a result. Has been tapering the prednisone. Feels that her shoulders are much worse than her hips in terms of pain/function. Pain is located proximally around the shoulders/upper arms and hips/thighs. Feels that reaching, lifting, tends to aggravate her sxs. Also notes that movement seems to help  PMHx: inflammatory myopathy, mixed connective tissue disorder, long-term corticosteroid use, dyslipidemia, A-Fib  : verified with patient  PLOF: independent without restrictions  Medications: see chart for full medication list  Patient goals for PT: reduce pain, improve mobility   Medical Screening: Patient denies bowel/bladder changes, pulsatile mass in abdomen, recent infection/illness, drastic weight change, hx cancer, saddle anesthesia,    Precautions:  Precautions  Precautions Comment: inflammtory myopathy; low fall  risk  Pain:  Pain Assessment  Pain Assessment: 0-10  Pain Score: 2      Objective   Eval Objective  Functional Performance:   STS: slight increased trunk flexion  Gait Analysis: R antalgic pattern with lateral trunk lean during stance phase  Shoulder Elevation AROM: ~120 B with some UT recruitment noted  Shoulder MMT:   Abduction: deferred due to patient reports of pain with movement   ER(0*): 4/5 B   IR (0*): 4/5 B  Hip ROM:   Flexion: R:90* (+) pain L:110*   Extension: not assessed this date   IR(90*): R:10* slight (+) painL:20*   ER(90*): R:20* slight (+) pain L: 30*  MMT:   Hip abduction(hooklying): R:4/5 L:4/5   Knee extension: R:4+/5 L:4+/5   Hip flexion: R:4/5 L:4/5  Observation: rounded shoulders posture; B protracted scapulae  Palpation: no ttp throughout B shoulders      Outcome Measures:  Other Measures  Lower Extremity Funtional Score (LEFS): 17     Treatments:  Supine/seated hip abduction GTB  Band pull aparts RTB  Seated ER RTB    EDUCATION:  Outpatient Education  Individual(s) Educated: Patient  Education Provided: Home Exercise Program  Risk and Benefits Discussed with Patient/Caregiver/Other: yes  Patient/Caregiver Demonstrated Understanding: yes  Plan of Care Discussed and Agreed Upon: yes  Patient Response to Education: Patient/Caregiver Verbalized Understanding of Information  Education Comment: review HEP    Goals:  Patient will demonstrate improvement in CONCEPCION by at least 12.8 points in order to meet MCID  Patient will demonstrate full hip/shoulder AROM without pain or compensation  Patient will demonstrate at least 4+/5 global LE and UE strength without pain  Patient will demonstrate I with HEP  Patient will be able to complete household lifting/cleaning activities without pain or compensation  Patient will be able to cut her toenails without pain or compensation  Patient will be able to perform 10 DL squats without pain or compensation  Patient will demonstrate good lumbopelvic control with  dynamic movements/exercise

## 2023-11-22 ENCOUNTER — OFFICE VISIT (OUTPATIENT)
Dept: RHEUMATOLOGY | Facility: CLINIC | Age: 73
End: 2023-11-22
Payer: MEDICARE

## 2023-11-22 VITALS
TEMPERATURE: 97.8 F | DIASTOLIC BLOOD PRESSURE: 81 MMHG | BODY MASS INDEX: 31.15 KG/M2 | SYSTOLIC BLOOD PRESSURE: 125 MMHG | HEIGHT: 63 IN | WEIGHT: 175.8 LBS | HEART RATE: 66 BPM

## 2023-11-22 DIAGNOSIS — Z79.899 LONG-TERM USE OF HIGH-RISK MEDICATION: ICD-10-CM

## 2023-11-22 DIAGNOSIS — G72.49 IDIOPATHIC INFLAMMATORY MYOPATHY: Primary | ICD-10-CM

## 2023-11-22 PROCEDURE — 99205 OFFICE O/P NEW HI 60 MIN: CPT | Performed by: STUDENT IN AN ORGANIZED HEALTH CARE EDUCATION/TRAINING PROGRAM

## 2023-11-22 PROCEDURE — 1126F AMNT PAIN NOTED NONE PRSNT: CPT | Performed by: STUDENT IN AN ORGANIZED HEALTH CARE EDUCATION/TRAINING PROGRAM

## 2023-11-22 PROCEDURE — 1159F MED LIST DOCD IN RCRD: CPT | Performed by: STUDENT IN AN ORGANIZED HEALTH CARE EDUCATION/TRAINING PROGRAM

## 2023-11-22 PROCEDURE — 1036F TOBACCO NON-USER: CPT | Performed by: STUDENT IN AN ORGANIZED HEALTH CARE EDUCATION/TRAINING PROGRAM

## 2023-11-22 PROCEDURE — 1160F RVW MEDS BY RX/DR IN RCRD: CPT | Performed by: STUDENT IN AN ORGANIZED HEALTH CARE EDUCATION/TRAINING PROGRAM

## 2023-11-22 RX ORDER — PREDNISONE 5 MG/1
10 TABLET ORAL DAILY
Qty: 60 TABLET | Refills: 2 | Status: SHIPPED | OUTPATIENT
Start: 2023-11-22 | End: 2024-01-21

## 2023-11-22 RX ORDER — MYCOPHENOLATE MOFETIL 500 MG/1
TABLET ORAL
Qty: 84 TABLET | Refills: 1 | Status: SHIPPED | OUTPATIENT
Start: 2023-11-22 | End: 2023-12-20

## 2023-11-22 NOTE — PROGRESS NOTES
Rheumatology New Outpatient  Note    Subjective   Nora Bynum is a 73 y.o. female presenting today for Arthritis.    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 She was previously seen by Dr. Jacome, Dr. Barbosa , Dr. Genao and Dr. Duckworth last visit 10/2023.     Rheum hx (Recall from previous notes):  Previously saw Dr. Jacome at [facility] since 2019. Nml CK, RF 16, ESR 47. Then saw Dr. Gomes and Elan (12/2019 GLADYS > 1:1280, negative JUSTIN, nml C3/C4, RF/CCP negative)   Sjogrens, SSA+ , Raynauds, dry mouth since 2019 June 2022, Prednisone 20mg daily 6/25/23 by Dr. Pierson for possible PMR (also hx of elevated CK up to 1411 previously), followed up with Dr. 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 EMG done by Dr. Thakur (Neurology) and recommended for muscle biopsy. She underwent MM bx 5/17/23 for Left deltoid and left rectus femoris. Pt was called by Dr. 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      She followed up with Alphonso Stanton on 5/30/23: On Pred 60mg daily and Azathioprine 150mg daily. Muscle pain improved, steorid taper by 10mg in 4 weeks, then 50mg X3 weeks, then 40mg X2 weeks. Continue AZA 150mg daily.     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 Duncan and pending repeat PFT and recommended switching AZA to Cellcept    Current IS:  - Prednisone 60mg 5/25/23, then tapering every 4 weeks by 10mg, then 1 mg q 2 weeks  currently on 7.5 mg daily  - Imuran 50mg TID     Interval history:  She is currently on 7.5 mg  prednisone and continues to be on imuran.  She reports that her weakness has improved significantly overall with steroids . No new joint swelling. No morning stiffness. No skin rashes/hair loss. No dysphagia. No SOB.     + Bluish/greenish discoloration of her fingertips with cold  + Right eye dryness          Past Medical History:   Past Medical History:   Diagnosis Date    Other specified health status     No pertinent past medical history       Allergies: No Known Allergies    Medications:   Current Outpatient Medications:     azaTHIOprine (Imuran) 50 mg tablet, Take 1 tablet (50 mg) by mouth 3 times a day., Disp: 180 tablet, Rfl: 1    bisoprolol (Zebeta) 5 mg tablet, Take 1.5 tablets (7.5 mg) by mouth once daily at bedtime., Disp: , Rfl:     cholecalciferol (Vitamin D3) 50 mcg (2,000 unit) capsule, Take 1 capsule (2,000 Units) by mouth once daily., Disp: , Rfl:     cyanocobalamin (Vitamin B-12) 1,000 mcg tablet, Take 1 tablet (1,000 mcg) by mouth once daily., Disp: , Rfl:     dofetilide (Tikosyn) 250 mcg capsule, Take 1 capsule (250 mcg) by mouth twice a day., Disp: , Rfl:     Eliquis 5 mg tablet, Take 1 tablet (5 mg) by mouth 2 times a day., Disp: , Rfl:     fish oil concentrate (Fish OiL) 120-180 mg capsule, Take by mouth., Disp: , Rfl:     magnesium 30 mg tablet, Take 1 tablet (30 mg) by mouth once daily., Disp: , Rfl:     melatonin 5 mg tablet, Take 1 tablet (5 mg) by mouth once daily at bedtime., Disp: , Rfl:     pantoprazole (ProtoNix) 40 mg EC tablet, Take 1 tablet (40 mg) by mouth once daily. Do not crush, chew, or split., Disp: 30 tablet, Rfl: 11    predniSONE (Deltasone) 1 mg tablet, Take 4 tablets (4 mg) by mouth once daily., Disp: 60 tablet, Rfl: 2    predniSONE (Deltasone) 2.5 mg tablet, Take 1 tablet (2.5 mg) by mouth once daily., Disp: 60 tablet, Rfl: 1    predniSONE (Deltasone) 5 mg tablet, Take 1 tablet (5 mg) by mouth once daily., Disp: 60 tablet, Rfl: 0    vit B complex 100 combo no.2 (B-100  "COMPLEX ORAL), Take 1 capsule by mouth once daily., Disp: , Rfl:     Review of Systems:   All 10 review of systems have been reviewed and are negative for complaint except as noted in the HPI    Objective   Physical Examination:  Vitals:    23 1022   BP: 125/81   Pulse: 66   Temp: 36.6 °C (97.8 °F)     Growth %ile SmartLinks can only be used for patients less than 20 years old.  Ht Readings from Last 1 Encounters:   23 1.6 m (5' 3\")     Wt Readings from Last 1 Encounters:   23 79.7 kg (175 lb 12.8 oz)       General - NAD, sitting up in chair, well-groomed, pleasant, AAOx3  Head: Normocephalic, atraumatic  Eyes - PERRLA, EOMI. No conjunctiva injection.   Cardiovascular - Normal S1, S2. Regular rate and rhythm. No murmurs or rubs.  Lungs - Symmetric chest expansion. Clear to auscultation bilaterally.   Skin - No rashes or ulcers. Skin warm and dry. No erythema on bilateral cheeks.  Extremities - No edema, cyanosis ,or clubbing  Neurological - Alert and oriented x 3,  grossly intact. No focal deficit.  Musculoskeletal -  Shoulders: Full ROM, without pain, no swelling, warmth or tenderness.  Elbows: Full ROM, without pain, no swelling, warmth or tenderness.  Wrists: Full ROM, without pain, no swelling, warmth or tenderness.  MCP: No swelling, warmth or tenderness. Metacarpal squeeze negative  PIP: No swelling, warmth or tenderness.  DIP: No swelling, warmth or tenderness.  Hands : 5/5.    Sacroiliac joints: No local tenderness. JEIMY negative.   Hips: Full ROM.  No malalignment.  Knees:  Full ROM, without pain, no swelling, warmth or point tenderness.   Ankles: Full ROM, without pain, swelling, warmth or tenderness.  Toes:  Metatarsal squeeze negative  Cervical spine: No tenderness or limitation of movement  Lumbar spine: No tenderness or limitation of movement        Laboratory Testin/2023: GLADYS 1:2560 speckled, anti chromatin +, anti Ku + other myositis panel negative    2023 ESR 39, " CRP <0.4 mg/dL  6/2023 CK 1014 --> 435 --> 349 --> 141 --> 78 (9/23)   Aldolase 22.3 --> 8.7 --> 5.0 (9/23)  10/2023 CPK 97, aldolase 5.7, CBC shows elevated hematocrit and MCV    4/2023 Negative TB/hepatitis tests    Muscle biopsy 5/2023:   FINAL DIAGNOSIS  A, B.  LEFT DELTOID AND RECTUS FEMORIS MUSCLES, BIOPSIES:  --INFLAMMATORY MYOPATHY, SEE NOTE.    Note: Histopathologic features are qualitatively similar within the biopsy  specimens, however, they are quantitatively more severe within the rectus  femoris muscle.  The biopsies demonstrate both endomysial and  perimysial/vascular inflammation composed predominantly of CD3 positive T  cells, accompanied by muscle fiber necrosis with regeneration (highlighted with  antibodies to P62, CD68, and CD56).  Trichrome staining demonstrates no ragged  red fibers or rimmed vacuoles, however, it highlights perimysial fibrosis in  the rectus femoris muscle.  Myosin heavy chain immunostains show no fiber type  grouping.  Cytochrome oxidase staining is within normal limits, and Congo red  stain for amyloid is negative.       CT chest 6/2023:  1.  Mild subpleural reticulation and minimal subpleural ground glass   opacities most significant in the lower lobes bilaterally.  Suggest a   degree of fibrosis but unchanged from 11/12/2021.     2.  0.3 cm nodule in the left lower lobe, new from prior study.  May be   infectious or inflammatory recommend follow-up in 6-12 months.     MRI pancreas 7/2023:  Pancreas: Pancreas is normal in precontrast T1 signal intensity with no   solid masses or main pancreatic duct dilatation.     Multiple unilocular cystic pancreatic lesions communicate with the main   pancreatic duct are noted for example a 1.3 cm cystic structure in the   pancreatic body (3:37). 3 mm cystic lesion at the junction of the   pancreatic body and tail (3:36). No worrisome imaging features.     Biliary: No intrahepatic or extrahepatic biliary ductal dilation.  No    choledocholithiasis or stricture.     Gallbladder: No cholelithiasis or gallbladder wall thickening.     Liver: 1 cm right hepatic lobe cyst. No enhancing mass.     No thrombus in the portal venous system (splenic vein, main portal vein,   left and right anterior and right posterior portal vein branches).     Spleen: Wedge-shaped area of T2 hypointense signal in the spleen may be   related to prior trauma.     Kidneys: Kidneys enhance symmetrically without hydroureteronephrosis or   enhancing renal masses. 8 mm thinly septated cyst interpolar right kidney.     Adrenal glands: Adrenal glands are normal.     Lymph nodes: No lymphadenopathy by size criteria.     Mesentery: No ascites.     Osseous structures: No aggressive osseous lesions.       Assessment/Plan   Nora Bynum is a 73 y.o. 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 Extended myositis panel +Ku, MM biopsy. Elevated CK/aldolase/ESR previously  -Reviewed labs from 11/2023: CMP normal. CBC with , CRP 0.19. ESR:13, , Aldolase: 4.9  -Current IS:  mg/day and prednisone 7.5 mg daily  -Reviewed Dr. Duncan's notes and recommend switching AZA to CellCept. Patient agreed. She will have PFT done next week  -Will proceed with switching AZA to Cellcept. Increase prednisone to 10 mg temporarily while loading with CellCept.   -Update labs in 2 weeks  -Follow up in 4 weeks  -Repeat CT chest/HRCT in 2/2024 or as per Dr. Duncan  -Referred to physical therapy for muscle strengthening   -Saw surgery for pancreas and recommended monitoring   -Has had prevnar, shingles and covid vaccinations      ##Steroids/chronic use:  -Educated the patient about potential toxicity from chronic steroid use (most importantly increased  infections, diabetes, hypertension, osteoporosis, increased skin breakdown, psychosis..etc) and the need to taper it down as much as tolerated  -Advised to optimize bone health by increasing calcium and vitamin D dietary and supplementary intake  -DEXA scan: followed by PCP      The assessment and plan, risk and benefits were discussed with the patient. All of the patients questions were answered and patient agrees to the plan.      Janis Greco MD  Clinical   Department of Rheumatology   [facility]

## 2023-11-22 NOTE — PATIENT INSTRUCTIONS
Stop Imuran after today's dose  Start Cellcept 500 mg (1 pill)  twice daily for 2 weeks then 1000 mg (2 pills) twice daily  Blood work in 2 weeks  Follow up in 4 weeks

## 2023-11-28 ENCOUNTER — APPOINTMENT (OUTPATIENT)
Dept: PHYSICAL THERAPY | Facility: CLINIC | Age: 73
End: 2023-11-28
Payer: MEDICARE

## 2023-12-05 ENCOUNTER — APPOINTMENT (OUTPATIENT)
Dept: RESPIRATORY THERAPY | Facility: HOSPITAL | Age: 73
End: 2023-12-05
Payer: MEDICARE

## 2023-12-05 ENCOUNTER — APPOINTMENT (OUTPATIENT)
Dept: PHYSICAL THERAPY | Facility: CLINIC | Age: 73
End: 2023-12-05

## 2023-12-06 NOTE — PROGRESS NOTES
Rheumatology New Outpatient  Note    Subjective   Nora Bynum is a 73 y.o. female presenting today for Arthritis.    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 She was previously seen by Dr. Jacome, Dr. Barbosa , Dr. Genao and Dr. Duckworth last visit 10/2023.     Rheum hx (Recall from previous notes):  Previously saw Dr. Jacome at [facility] since 2019. Nml CK, RF 16, ESR 47. Then saw Dr. Gomes and Elan (12/2019 GLADYS > 1:1280, negative JUSTIN, nml C3/C4, RF/CCP negative)   Sjogrens, SSA+ , Raynauds, dry mouth since 2019 June 2022, Prednisone 20mg daily 6/25/23 by Dr. Pierson for possible PMR (also hx of elevated CK up to 1411 previously), followed up with Dr. 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 EMG done by Dr. Thakur (Neurology) and recommended for muscle biopsy. She underwent MM bx 5/17/23 for Left deltoid and left rectus femoris. Pt was called by Dr. 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      She followed up with Alphonso Stanton on 5/30/23: On Pred 60mg daily and Azathioprine 150mg daily. Muscle pain improved, steorid taper by 10mg in 4 weeks, then 50mg X3 weeks, then 40mg X2 weeks. Continue AZA 150mg daily.     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 Duncan and pending repeat PFT and recommended switching AZA to Cellcept    Current IS:  - Prednisone 60mg 5/25/23, then tapering every 4 weeks by 10mg, then 1 mg q 2 weeks. Restarted 10 mg due to transition from AZA to Cellcept  - Cellcept 500 mg BID     Interval  history:  She is currently on 10 mg prednisone and started Cellcept now on 500 mg BID and will go up today as per schedule.  She has loose stools but over all tolerating Cellcept well. She reports that her weakness has improved significantly overall with steroids . No new joint swelling. No morning stiffness. No skin rashes/hair loss. No dysphagia. No SOB. She has chronic cough that may be slightly getting worse in the last few weeks.              Past Medical History:   Past Medical History:   Diagnosis Date    Other specified health status     No pertinent past medical history       Allergies: No Known Allergies    Medications:   Current Outpatient Medications:     bisoprolol (Zebeta) 5 mg tablet, Take 1.5 tablets (7.5 mg) by mouth once daily at bedtime., Disp: , Rfl:     cholecalciferol (Vitamin D3) 50 mcg (2,000 unit) capsule, Take 1 capsule (2,000 Units) by mouth once daily., Disp: , Rfl:     cyanocobalamin (Vitamin B-12) 1,000 mcg tablet, Take 1 tablet (1,000 mcg) by mouth once daily., Disp: , Rfl:     dofetilide (Tikosyn) 250 mcg capsule, Take 1 capsule (250 mcg) by mouth twice a day., Disp: , Rfl:     Eliquis 5 mg tablet, Take 1 tablet (5 mg) by mouth 2 times a day., Disp: , Rfl:     fish oil concentrate (Fish OiL) 120-180 mg capsule, Take by mouth., Disp: , Rfl:     magnesium 30 mg tablet, Take 1 tablet (30 mg) by mouth once daily., Disp: , Rfl:     melatonin 5 mg tablet, Take 1 tablet (5 mg) by mouth once daily at bedtime., Disp: , Rfl:     mycophenolate (Cellcept) 500 mg tablet, Take 1 tablet (500 mg) by mouth 2 times a day for 14 days, THEN 2 tablets (1,000 mg) 2 times a day for 14 days., Disp: 84 tablet, Rfl: 1    pantoprazole (ProtoNix) 40 mg EC tablet, Take 1 tablet (40 mg) by mouth once daily. Do not crush, chew, or split., Disp: 30 tablet, Rfl: 11    predniSONE (Deltasone) 1 mg tablet, Take 4 tablets (4 mg) by mouth once daily., Disp: 60 tablet, Rfl: 2    predniSONE (Deltasone) 2.5 mg tablet, Take 1  "tablet (2.5 mg) by mouth once daily., Disp: 60 tablet, Rfl: 1    predniSONE (Deltasone) 5 mg tablet, Take 2 tablets (10 mg) by mouth once daily., Disp: 60 tablet, Rfl: 2    vit B complex 100 combo no.2 (B-100 COMPLEX ORAL), Take 1 capsule by mouth once daily., Disp: , Rfl:     Review of Systems:   All 10 review of systems have been reviewed and are negative for complaint except as noted in the HPI    Objective   Physical Examination:  There were no vitals filed for this visit.    Growth %ile SmartLinks can only be used for patients less than 20 years old.  Ht Readings from Last 1 Encounters:   11/22/23 1.6 m (5' 3\")     Wt Readings from Last 1 Encounters:   11/22/23 79.7 kg (175 lb 12.8 oz)       General - NAD, sitting up in chair, well-groomed, pleasant, AAOx3  Head: Normocephalic, atraumatic  Eyes - PERRLA, EOMI. No conjunctiva injection.   Cardiovascular - Normal S1, S2. Regular rate and rhythm. No murmurs or rubs.  Lungs - Symmetric chest expansion. Clear to auscultation bilaterally.   Skin - No rashes or ulcers. Skin warm and dry. No erythema on bilateral cheeks.  Extremities - No edema, cyanosis ,or clubbing  Neurological - Alert and oriented x 3,  grossly intact. No focal deficit.  Musculoskeletal -  Shoulders: Full ROM, without pain, no swelling, warmth or tenderness.  Elbows: Full ROM, without pain, no swelling, warmth or tenderness.  Wrists: Full ROM, without pain, no swelling, warmth or tenderness.  MCP: No swelling, warmth or tenderness. Metacarpal squeeze negative  PIP: No swelling, warmth or tenderness.  DIP: No swelling, warmth or tenderness.  Hands : 5/5.    Sacroiliac joints: No local tenderness. JEIMY negative.   Hips: Full ROM.  No malalignment.  Knees:  Full ROM, without pain, no swelling, warmth or point tenderness.   Ankles: Full ROM, without pain, swelling, warmth or tenderness.  Toes:  Metatarsal squeeze negative  Cervical spine: No tenderness or limitation of movement  Lumbar spine: No " tenderness or limitation of movement        Laboratory Testin2023: GLADYS 1:2560 speckled, anti chromatin +, anti Ku + other myositis panel negative    2023 ESR 39, CRP <0.4 mg/dL  2023 CK 1014 --> 435 --> 349 --> 141 --> 78 ()   Aldolase 22.3 --> 8.7 --> 5.0 ()  10/2023 CPK 97, aldolase 5.7, CBC shows elevated hematocrit and MCV    2023 Negative TB/hepatitis tests    Muscle biopsy 2023:   FINAL DIAGNOSIS  A, B.  LEFT DELTOID AND RECTUS FEMORIS MUSCLES, BIOPSIES:  --INFLAMMATORY MYOPATHY, SEE NOTE.    Note: Histopathologic features are qualitatively similar within the biopsy  specimens, however, they are quantitatively more severe within the rectus  femoris muscle.  The biopsies demonstrate both endomysial and  perimysial/vascular inflammation composed predominantly of CD3 positive T  cells, accompanied by muscle fiber necrosis with regeneration (highlighted with  antibodies to P62, CD68, and CD56).  Trichrome staining demonstrates no ragged  red fibers or rimmed vacuoles, however, it highlights perimysial fibrosis in  the rectus femoris muscle.  Myosin heavy chain immunostains show no fiber type  grouping.  Cytochrome oxidase staining is within normal limits, and Congo red  stain for amyloid is negative.       CT chest 2023:  1.  Mild subpleural reticulation and minimal subpleural ground glass   opacities most significant in the lower lobes bilaterally.  Suggest a   degree of fibrosis but unchanged from 2021.     2.  0.3 cm nodule in the left lower lobe, new from prior study.  May be   infectious or inflammatory recommend follow-up in 6-12 months.     MRI pancreas 2023:  Pancreas: Pancreas is normal in precontrast T1 signal intensity with no   solid masses or main pancreatic duct dilatation.     Multiple unilocular cystic pancreatic lesions communicate with the main   pancreatic duct are noted for example a 1.3 cm cystic structure in the   pancreatic body (3:37). 3 mm cystic lesion at  the junction of the   pancreatic body and tail (3:36). No worrisome imaging features.     Biliary: No intrahepatic or extrahepatic biliary ductal dilation.  No   choledocholithiasis or stricture.     Gallbladder: No cholelithiasis or gallbladder wall thickening.     Liver: 1 cm right hepatic lobe cyst. No enhancing mass.     No thrombus in the portal venous system (splenic vein, main portal vein,   left and right anterior and right posterior portal vein branches).     Spleen: Wedge-shaped area of T2 hypointense signal in the spleen may be   related to prior trauma.     Kidneys: Kidneys enhance symmetrically without hydroureteronephrosis or   enhancing renal masses. 8 mm thinly septated cyst interpolar right kidney.     Adrenal glands: Adrenal glands are normal.     Lymph nodes: No lymphadenopathy by size criteria.     Mesentery: No ascites.     Osseous structures: No aggressive osseous lesions.       Assessment/Plan   Nora Bynum is a 73 y.o. 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 Extended myositis panel +Ku, MM biopsy. Elevated CK/aldolase/ESR previously  -Reviewed labs from 11/2023: CMP normal. CBC with , CRP 0.19. ESR:13, , Aldolase: 4.9  -Current IS:  mg/day and prednisone 7.5 mg daily  -Reviewed Dr. Duncan's notes and recommend switching AZA to CellCept. Patient agreed.   -Switched AZA to Cellcept at last visit and increased prednisone to 10 mg temporarily while loading with CellCept. Current dose is 500 mg BID  -She will have PFT done next week  -Update labs today and if normal then she can start tapering pred by 1 mg q 4 weeks.  We may go up to 1.5 g Cellcept  BID if needed.   -Repeat CT chest/HRCT in 6-12 months from 6/2023 or as per   [place]  -Referred to physical therapy for muscle strengthening   -Saw general surgery for pancreas and recommended monitoring   -Has had prevnar, shingles and covid vaccinations  -Follow up in 4 weeks      ##Medical management of high risk medication:  -Drug name: Cellcept  -Tolerating  and no side effects  -Labs reviewed  -Most recent TB/hepatitis tests negative in 4/2023  -Patient is advised to hold therapy if they experience any signs of infection requiring antibiotic therapy, and to resume after full recovery and conclusion of antibiotic course. Patient is advised to contact provider in this case.  -Educated the patient about risks and benefits of such therapy, and that the benefits of this therapy outweigh the risks and thus we will continue.    ##Steroids/chronic use:  -Educated the patient about potential toxicity from chronic steroid use (most importantly increased infections, diabetes, hypertension, osteoporosis, increased skin breakdown, psychosis..etc) and the need to taper it down as much as tolerated  -Advised to optimize bone health by increasing calcium and vitamin D dietary and supplementary intake  -DEXA scan: followed by PCP      The assessment and plan, risk and benefits were discussed with the patient. All of the patients questions were answered and patient agrees to the plan.      Janis Greco MD  Clinical   Department of Rheumatology   [facility]

## 2023-12-07 ENCOUNTER — OFFICE VISIT (OUTPATIENT)
Dept: RHEUMATOLOGY | Facility: CLINIC | Age: 73
End: 2023-12-07
Payer: MEDICARE

## 2023-12-07 ENCOUNTER — LAB (OUTPATIENT)
Dept: LAB | Facility: LAB | Age: 73
End: 2023-12-07
Payer: MEDICARE

## 2023-12-07 VITALS
BODY MASS INDEX: 31.18 KG/M2 | HEART RATE: 67 BPM | DIASTOLIC BLOOD PRESSURE: 84 MMHG | WEIGHT: 176 LBS | HEIGHT: 63 IN | SYSTOLIC BLOOD PRESSURE: 131 MMHG | TEMPERATURE: 98.1 F

## 2023-12-07 DIAGNOSIS — Z79.899 LONG-TERM USE OF HIGH-RISK MEDICATION: ICD-10-CM

## 2023-12-07 DIAGNOSIS — M19.049 HAND ARTHRITIS: ICD-10-CM

## 2023-12-07 DIAGNOSIS — G72.49 IDIOPATHIC INFLAMMATORY MYOPATHY: ICD-10-CM

## 2023-12-07 DIAGNOSIS — Z79.52 LONG TERM (CURRENT) USE OF SYSTEMIC STEROIDS: ICD-10-CM

## 2023-12-07 DIAGNOSIS — M35.1 MCTD (MIXED CONNECTIVE TISSUE DISEASE) (MULTI): Primary | ICD-10-CM

## 2023-12-07 LAB
ALBUMIN SERPL BCP-MCNC: 4 G/DL (ref 3.4–5)
ALP SERPL-CCNC: 53 U/L (ref 33–136)
ALT SERPL W P-5'-P-CCNC: 9 U/L (ref 7–45)
ANION GAP SERPL CALC-SCNC: 15 MMOL/L (ref 10–20)
AST SERPL W P-5'-P-CCNC: 22 U/L (ref 9–39)
BASOPHILS # BLD AUTO: 0.04 X10*3/UL (ref 0–0.1)
BASOPHILS NFR BLD AUTO: 0.6 %
BILIRUB SERPL-MCNC: 0.6 MG/DL (ref 0–1.2)
BUN SERPL-MCNC: 13 MG/DL (ref 6–23)
C3 SERPL-MCNC: 129 MG/DL (ref 87–200)
C4 SERPL-MCNC: 33 MG/DL (ref 10–50)
CALCIUM SERPL-MCNC: 9.1 MG/DL (ref 8.6–10.3)
CHLORIDE SERPL-SCNC: 102 MMOL/L (ref 98–107)
CK SERPL-CCNC: 95 U/L (ref 0–215)
CO2 SERPL-SCNC: 25 MMOL/L (ref 21–32)
CREAT SERPL-MCNC: 0.61 MG/DL (ref 0.5–1.05)
CRP SERPL-MCNC: 1.79 MG/DL
EOSINOPHIL # BLD AUTO: 0.05 X10*3/UL (ref 0–0.4)
EOSINOPHIL NFR BLD AUTO: 0.8 %
ERYTHROCYTE [DISTWIDTH] IN BLOOD BY AUTOMATED COUNT: 13.7 % (ref 11.5–14.5)
ERYTHROCYTE [SEDIMENTATION RATE] IN BLOOD BY WESTERGREN METHOD: 12 MM/H (ref 0–30)
GFR SERPL CREATININE-BSD FRML MDRD: >90 ML/MIN/1.73M*2
GLUCOSE SERPL-MCNC: 103 MG/DL (ref 74–99)
HCT VFR BLD AUTO: 43.5 % (ref 36–46)
HGB BLD-MCNC: 14.4 G/DL (ref 12–16)
IMM GRANULOCYTES # BLD AUTO: 0.02 X10*3/UL (ref 0–0.5)
IMM GRANULOCYTES NFR BLD AUTO: 0.3 % (ref 0–0.9)
LYMPHOCYTES # BLD AUTO: 0.54 X10*3/UL (ref 0.8–3)
LYMPHOCYTES NFR BLD AUTO: 8.3 %
MCH RBC QN AUTO: 33 PG (ref 26–34)
MCHC RBC AUTO-ENTMCNC: 33.1 G/DL (ref 32–36)
MCV RBC AUTO: 100 FL (ref 80–100)
MONOCYTES # BLD AUTO: 0.4 X10*3/UL (ref 0.05–0.8)
MONOCYTES NFR BLD AUTO: 6.2 %
NEUTROPHILS # BLD AUTO: 5.45 X10*3/UL (ref 1.6–5.5)
NEUTROPHILS NFR BLD AUTO: 83.8 %
NRBC BLD-RTO: 0 /100 WBCS (ref 0–0)
PLATELET # BLD AUTO: 259 X10*3/UL (ref 150–450)
POTASSIUM SERPL-SCNC: 4.2 MMOL/L (ref 3.5–5.3)
PROT SERPL-MCNC: 6.6 G/DL (ref 6.4–8.2)
RBC # BLD AUTO: 4.37 X10*6/UL (ref 4–5.2)
SODIUM SERPL-SCNC: 138 MMOL/L (ref 136–145)
WBC # BLD AUTO: 6.5 X10*3/UL (ref 4.4–11.3)

## 2023-12-07 PROCEDURE — 1036F TOBACCO NON-USER: CPT | Performed by: STUDENT IN AN ORGANIZED HEALTH CARE EDUCATION/TRAINING PROGRAM

## 2023-12-07 PROCEDURE — 1126F AMNT PAIN NOTED NONE PRSNT: CPT | Performed by: STUDENT IN AN ORGANIZED HEALTH CARE EDUCATION/TRAINING PROGRAM

## 2023-12-07 PROCEDURE — 86140 C-REACTIVE PROTEIN: CPT

## 2023-12-07 PROCEDURE — 80053 COMPREHEN METABOLIC PANEL: CPT

## 2023-12-07 PROCEDURE — 85025 COMPLETE CBC W/AUTO DIFF WBC: CPT

## 2023-12-07 PROCEDURE — 1160F RVW MEDS BY RX/DR IN RCRD: CPT | Performed by: STUDENT IN AN ORGANIZED HEALTH CARE EDUCATION/TRAINING PROGRAM

## 2023-12-07 PROCEDURE — 82085 ASSAY OF ALDOLASE: CPT

## 2023-12-07 PROCEDURE — 82550 ASSAY OF CK (CPK): CPT

## 2023-12-07 PROCEDURE — 99215 OFFICE O/P EST HI 40 MIN: CPT | Performed by: STUDENT IN AN ORGANIZED HEALTH CARE EDUCATION/TRAINING PROGRAM

## 2023-12-07 PROCEDURE — 85652 RBC SED RATE AUTOMATED: CPT

## 2023-12-07 PROCEDURE — 1159F MED LIST DOCD IN RCRD: CPT | Performed by: STUDENT IN AN ORGANIZED HEALTH CARE EDUCATION/TRAINING PROGRAM

## 2023-12-07 PROCEDURE — 86160 COMPLEMENT ANTIGEN: CPT

## 2023-12-07 PROCEDURE — 36415 COLL VENOUS BLD VENIPUNCTURE: CPT

## 2023-12-07 RX ORDER — MYCOPHENOLIC ACID 180 MG/1
360 TABLET, DELAYED RELEASE ORAL 2 TIMES DAILY
Qty: 120 TABLET | Refills: 2 | Status: CANCELLED | OUTPATIENT
Start: 2023-12-07

## 2023-12-08 ENCOUNTER — APPOINTMENT (OUTPATIENT)
Dept: PHYSICAL THERAPY | Facility: CLINIC | Age: 73
End: 2023-12-08

## 2023-12-09 LAB — ALDOLASE SERPL-CCNC: 3.7 U/L (ref 1.2–7.6)

## 2023-12-12 ENCOUNTER — APPOINTMENT (OUTPATIENT)
Dept: RHEUMATOLOGY | Facility: CLINIC | Age: 73
End: 2023-12-12
Payer: COMMERCIAL

## 2023-12-13 ENCOUNTER — APPOINTMENT (OUTPATIENT)
Dept: RHEUMATOLOGY | Facility: CLINIC | Age: 73
End: 2023-12-13
Payer: COMMERCIAL

## 2023-12-14 ENCOUNTER — APPOINTMENT (OUTPATIENT)
Dept: RESPIRATORY THERAPY | Facility: CLINIC | Age: 73
End: 2023-12-14
Payer: MEDICARE

## 2023-12-14 ENCOUNTER — HOSPITAL ENCOUNTER (OUTPATIENT)
Dept: RESPIRATORY THERAPY | Facility: HOSPITAL | Age: 73
Discharge: HOME | End: 2023-12-14
Payer: MEDICARE

## 2023-12-14 DIAGNOSIS — Q99.8 MYOSITIS ASSOCIATED ANTIBODY POSITIVE (HHS-HCC): ICD-10-CM

## 2023-12-14 DIAGNOSIS — J84.9 ILD (INTERSTITIAL LUNG DISEASE) (MULTI): ICD-10-CM

## 2023-12-14 PROCEDURE — 94726 PLETHYSMOGRAPHY LUNG VOLUMES: CPT | Performed by: INTERNAL MEDICINE

## 2023-12-14 PROCEDURE — 94618 PULMONARY STRESS TESTING: CPT | Performed by: INTERNAL MEDICINE

## 2023-12-14 PROCEDURE — 94729 DIFFUSING CAPACITY: CPT | Performed by: INTERNAL MEDICINE

## 2023-12-14 PROCEDURE — 94060 EVALUATION OF WHEEZING: CPT | Performed by: INTERNAL MEDICINE

## 2023-12-14 PROCEDURE — 94729 DIFFUSING CAPACITY: CPT

## 2023-12-21 ENCOUNTER — APPOINTMENT (OUTPATIENT)
Dept: PHYSICAL THERAPY | Facility: CLINIC | Age: 73
End: 2023-12-21

## 2024-01-02 ENCOUNTER — LAB (OUTPATIENT)
Dept: LAB | Facility: LAB | Age: 74
End: 2024-01-02
Payer: COMMERCIAL

## 2024-01-02 DIAGNOSIS — G72.49 IDIOPATHIC INFLAMMATORY MYOPATHY: ICD-10-CM

## 2024-01-02 LAB
CK SERPL-CCNC: 137 U/L (ref 0–215)
CRP SERPL-MCNC: 0.24 MG/DL
ERYTHROCYTE [SEDIMENTATION RATE] IN BLOOD BY WESTERGREN METHOD: 10 MM/H (ref 0–30)

## 2024-01-02 PROCEDURE — 82550 ASSAY OF CK (CPK): CPT

## 2024-01-02 PROCEDURE — 85652 RBC SED RATE AUTOMATED: CPT

## 2024-01-02 PROCEDURE — 86140 C-REACTIVE PROTEIN: CPT

## 2024-01-02 PROCEDURE — 36415 COLL VENOUS BLD VENIPUNCTURE: CPT

## 2024-01-02 PROCEDURE — 82085 ASSAY OF ALDOLASE: CPT

## 2024-01-03 NOTE — PROGRESS NOTES
Rheumatology New Outpatient  Note  Virtual or Telephone Consent    An interactive audio and video telecommunication system which permits real time communications between the patient (at the originating site) and provider (at the distant site) was utilized to provide this telehealth service.   Verbal consent was requested and obtained from Nora Bynum on this date, 01/04/24 for a telehealth visit.     Subjective   Nora Bynum is a 73 y.o. female presenting today for Joint Pain and myositis.    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 She was previously seen by Dr. Jacome, Dr. Barbosa , Dr. Genao and Dr. Duckworth last visit 10/2023.     Rheum hx (Recall from previous notes):  Previously saw Dr. Jacome at [facility] since 2019. Nml CK, RF 16, ESR 47. Then saw Dr. Gomes and Elan (12/2019 GLADYS > 1:1280, negative JUSTIN, nml C3/C4, RF/CCP negative)   Sjogrens, SSA+ , Raynauds, dry mouth since 2019 June 2022, Prednisone 20mg daily 6/25/23 by Dr. Pierson for possible PMR (also hx of elevated CK up to 1411 previously), followed up with Dr. 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 EMG done by Dr. Thakur (Neurology) and recommended for muscle biopsy. She underwent MM bx 5/17/23 for Left deltoid and left rectus femoris. Pt was called by Dr. 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      She followed up with Dr. Barbosa, Alphonso on 5/30/23: On Pred 60mg daily and Azathioprine 150mg daily. Muscle pain improved, steorid taper by 10mg in 4 weeks, then 50mg X3 weeks, then 40mg X2 weeks. Continue AZA 150mg daily.     CT chest done 6/2023 showed features of CTD-ILD: subpleural reticulation and thin-walled cysts   possibility of LIP ,  lymphocytic interstitial pneumonia versus NSIP nonspecific interstitial pneumonia    She follows with pulmonary Dr. Duncan and pending repeat PFT and recommended switching AZA to Cellcept    Current IS:  - Prednisone 60mg 5/25/23, then tapering every 4 weeks by 10mg, then 1 mg q 2 weeks. Briefly restarted 10 mg due to transition from AZA to Cellcept. Now 7.5 mg  - Cellcept 1000 mg BID     Interval history:  She is currently on 7.5 mg prednisone and  Cellcept now on 1000 mg BID   She has loose stools and episodes of incontinence with Cellcept. She reports that her weakness has improved significantly overall with steroids/Cellcept . No new joint swelling. No morning stiffness. No skin rashes/hair loss. No dysphagia. No SOB. She has chronic cough that may be slightly getting worse in the last few weeks.              Past Medical History:   Past Medical History:   Diagnosis Date    Other specified health status     No pertinent past medical history       Allergies: No Known Allergies    Medications:   Current Outpatient Medications:     bisoprolol (Zebeta) 5 mg tablet, Take 1.5 tablets (7.5 mg) by mouth once daily at bedtime., Disp: , Rfl:     cholecalciferol (Vitamin D3) 50 mcg (2,000 unit) capsule, Take 1 capsule (2,000 Units) by mouth once daily., Disp: , Rfl:     cyanocobalamin (Vitamin B-12) 1,000 mcg tablet, Take 1 tablet (1,000 mcg) by mouth once daily., Disp: , Rfl:     dofetilide (Tikosyn) 250 mcg capsule, Take 1 capsule (250 mcg) by mouth twice a day., Disp: , Rfl:     Eliquis 5 mg tablet, Take 1 tablet (5 mg) by mouth 2 times a day., Disp: , Rfl:     fish oil concentrate (Fish OiL) 120-180 mg capsule, Take by mouth., Disp: , Rfl:     magnesium 30 mg tablet, Take 1 tablet (30 mg) by mouth once daily., Disp: , Rfl:     melatonin 5 mg tablet, Take 1 tablet (5 mg) by mouth once daily at bedtime., Disp: , Rfl:     mycophenolate (Myfortic) 360 mg EC tablet, Take 2 tablets (720 mg) by mouth 2 times a day., Disp:  "120 tablet, Rfl: 1    pantoprazole (ProtoNix) 40 mg EC tablet, Take 1 tablet (40 mg) by mouth once daily. Do not crush, chew, or split., Disp: 30 tablet, Rfl: 11    predniSONE (Deltasone) 1 mg tablet, Take 4 tablets (4 mg) by mouth once daily., Disp: 60 tablet, Rfl: 2    predniSONE (Deltasone) 2.5 mg tablet, Take 1 tablet (2.5 mg) by mouth once daily., Disp: 60 tablet, Rfl: 1    predniSONE (Deltasone) 5 mg tablet, Take 2 tablets (10 mg) by mouth once daily., Disp: 60 tablet, Rfl: 2    vit B complex 100 combo no.2 (B-100 COMPLEX ORAL), Take 1 capsule by mouth once daily., Disp: , Rfl:     Review of Systems:   All 10 review of systems have been reviewed and are negative for complaint except as noted in the HPI    Objective   Physical Examination:  There were no vitals filed for this visit.    Growth %ile SmartLinks can only be used for patients less than 20 years old.  Ht Readings from Last 1 Encounters:   23 1.6 m (5' 3\")     Wt Readings from Last 1 Encounters:   23 79.8 kg (176 lb)       Exam limited, virtual visit        Laboratory Testin2023: GLADYS 1:2560 speckled, anti chromatin +, anti Ku + other myositis panel negative    2023 ESR 39, CRP <0.4 mg/dL  2023 CK 1014 --> 435 --> 349 --> 141 --> 78 ()   Aldolase 22.3 --> 8.7 --> 5.0 ()  10/2023 CPK 97, aldolase 5.7, CBC shows elevated hematocrit and MCV    2023 Negative TB/hepatitis tests    Muscle biopsy 2023:   FINAL DIAGNOSIS  A, B.  LEFT DELTOID AND RECTUS FEMORIS MUSCLES, BIOPSIES:  --INFLAMMATORY MYOPATHY, SEE NOTE.    Note: Histopathologic features are qualitatively similar within the biopsy  specimens, however, they are quantitatively more severe within the rectus  femoris muscle.  The biopsies demonstrate both endomysial and  perimysial/vascular inflammation composed predominantly of CD3 positive T  cells, accompanied by muscle fiber necrosis with regeneration (highlighted with  antibodies to P62, CD68, and CD56).  Trichrome " staining demonstrates no ragged  red fibers or rimmed vacuoles, however, it highlights perimysial fibrosis in  the rectus femoris muscle.  Myosin heavy chain immunostains show no fiber type  grouping.  Cytochrome oxidase staining is within normal limits, and Congo red  stain for amyloid is negative.       CT chest 6/2023:  1.  Mild subpleural reticulation and minimal subpleural ground glass   opacities most significant in the lower lobes bilaterally.  Suggest a   degree of fibrosis but unchanged from 11/12/2021.     2.  0.3 cm nodule in the left lower lobe, new from prior study.  May be   infectious or inflammatory recommend follow-up in 6-12 months.     MRI pancreas 7/2023:  Pancreas: Pancreas is normal in precontrast T1 signal intensity with no   solid masses or main pancreatic duct dilatation.     Multiple unilocular cystic pancreatic lesions communicate with the main   pancreatic duct are noted for example a 1.3 cm cystic structure in the   pancreatic body (3:37). 3 mm cystic lesion at the junction of the   pancreatic body and tail (3:36). No worrisome imaging features.     Biliary: No intrahepatic or extrahepatic biliary ductal dilation.  No   choledocholithiasis or stricture.     Gallbladder: No cholelithiasis or gallbladder wall thickening.     Liver: 1 cm right hepatic lobe cyst. No enhancing mass.     No thrombus in the portal venous system (splenic vein, main portal vein,   left and right anterior and right posterior portal vein branches).     Spleen: Wedge-shaped area of T2 hypointense signal in the spleen may be   related to prior trauma.     Kidneys: Kidneys enhance symmetrically without hydroureteronephrosis or   enhancing renal masses. 8 mm thinly septated cyst interpolar right kidney.     Adrenal glands: Adrenal glands are normal.     Lymph nodes: No lymphadenopathy by size criteria.     Mesentery: No ascites.     Osseous structures: No aggressive osseous lesions.       Assessment/Plan   Nora ALMEIDA  Fletcher is a 73 y.o. 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 Extended myositis panel +Ku, MM biopsy. Elevated CK/aldolase/ESR previously  -Current IS:  mg/day and prednisone 7.5 mg daily  -Reviewed Dr. Duncan's notes and recommend switching AZA to CellCept. Patient agreed.   -Switched AZA to Cellcept. Current dose is 1000 mg BID. We will switch to Myfortic 360 mg 2 pills BID due to side effects with cellcept (can go up to 1080 mg BID)  -Updated labs 12/2023 normal and she started tapering pred by 1 mg q 4 weeks. She is now on 7.5 mg prednisone.    -Reviewed labs from 12/2023: CMP/CBC normal.  CRP 1.79. ESR:12, CK 95, Aldolase: 3.7.Repeat ESR/CRP/CK/aldolase in 1/2024 normal.   -Pending PFT  results  -Repeat CT chest/HRCT in 6-12 months from 6/2023 or as per Dr. Duncan  -Referred to physical therapy for muscle strengthening   -Saw general surgery for pancreas and recommended monitoring   -Has had prevnar, shingles and covid vaccinations  -Follow up in 4 weeks      ##Medical management of high risk medication:  -Drug name: Cellcept  -GI side effects and will switch to Myfortic  -Labs reviewed  -Most recent TB/hepatitis tests negative in 4/2023  -Patient is advised to hold therapy if they experience any signs of infection requiring antibiotic therapy, and to resume after full recovery and conclusion of antibiotic course. Patient is advised to contact provider in this case.  -Educated the patient about risks and benefits of such therapy, and that the benefits of this therapy outweigh the risks and thus we will continue.    ##Steroids/chronic use:  -Educated the patient about potential toxicity from chronic steroid use (most importantly increased infections, diabetes,  hypertension, osteoporosis, increased skin breakdown, psychosis..etc) and the need to taper it down as much as tolerated  -Advised to optimize bone health by increasing calcium and vitamin D dietary and supplementary intake  -DEXA scan: followed by PCP      The assessment and plan, risk and benefits were discussed with the patient. All of the patients questions were answered and patient agrees to the plan.      Janis Greco MD  Clinical   Department of Rheumatology   [facility]

## 2024-01-04 ENCOUNTER — TELEMEDICINE (OUTPATIENT)
Dept: RHEUMATOLOGY | Facility: CLINIC | Age: 74
End: 2024-01-04
Payer: COMMERCIAL

## 2024-01-04 DIAGNOSIS — E55.9 VITAMIN D DEFICIENCY: ICD-10-CM

## 2024-01-04 DIAGNOSIS — Z79.899 LONG-TERM USE OF HIGH-RISK MEDICATION: ICD-10-CM

## 2024-01-04 DIAGNOSIS — Z79.52 LONG TERM (CURRENT) USE OF SYSTEMIC STEROIDS: ICD-10-CM

## 2024-01-04 DIAGNOSIS — M81.6 LOCALIZED OSTEOPOROSIS WITHOUT CURRENT PATHOLOGICAL FRACTURE: ICD-10-CM

## 2024-01-04 DIAGNOSIS — G72.49 IDIOPATHIC INFLAMMATORY MYOPATHY: Primary | ICD-10-CM

## 2024-01-04 LAB — ALDOLASE SERPL-CCNC: 4.2 U/L (ref 1.2–7.6)

## 2024-01-04 PROCEDURE — 99215 OFFICE O/P EST HI 40 MIN: CPT | Performed by: STUDENT IN AN ORGANIZED HEALTH CARE EDUCATION/TRAINING PROGRAM

## 2024-01-04 RX ORDER — MYCOPHENOLIC ACID 360 MG/1
720 TABLET, DELAYED RELEASE ORAL 2 TIMES DAILY
Qty: 120 TABLET | Refills: 1 | Status: SHIPPED | OUTPATIENT
Start: 2024-01-04 | End: 2024-02-03

## 2024-01-05 ENCOUNTER — LAB (OUTPATIENT)
Dept: LAB | Facility: LAB | Age: 74
End: 2024-01-05
Payer: COMMERCIAL

## 2024-01-05 DIAGNOSIS — Z79.899 LONG-TERM USE OF HIGH-RISK MEDICATION: ICD-10-CM

## 2024-01-05 DIAGNOSIS — G72.49 IDIOPATHIC INFLAMMATORY MYOPATHY: ICD-10-CM

## 2024-01-05 DIAGNOSIS — E55.9 VITAMIN D DEFICIENCY: ICD-10-CM

## 2024-01-05 LAB
25(OH)D3 SERPL-MCNC: 51 NG/ML (ref 30–100)
ALBUMIN SERPL BCP-MCNC: 4.1 G/DL (ref 3.4–5)
ALP SERPL-CCNC: 51 U/L (ref 33–136)
ALT SERPL W P-5'-P-CCNC: 14 U/L (ref 7–45)
ANION GAP SERPL CALC-SCNC: 11 MMOL/L (ref 10–20)
AST SERPL W P-5'-P-CCNC: 25 U/L (ref 9–39)
BASOPHILS # BLD AUTO: 0.02 X10*3/UL (ref 0–0.1)
BASOPHILS NFR BLD AUTO: 0.2 %
BILIRUB SERPL-MCNC: 0.4 MG/DL (ref 0–1.2)
BUN SERPL-MCNC: 13 MG/DL (ref 6–23)
CALCIUM SERPL-MCNC: 9.4 MG/DL (ref 8.6–10.3)
CHLORIDE SERPL-SCNC: 102 MMOL/L (ref 98–107)
CO2 SERPL-SCNC: 31 MMOL/L (ref 21–32)
CREAT SERPL-MCNC: 0.59 MG/DL (ref 0.5–1.05)
EOSINOPHIL # BLD AUTO: 0.05 X10*3/UL (ref 0–0.4)
EOSINOPHIL NFR BLD AUTO: 0.6 %
ERYTHROCYTE [DISTWIDTH] IN BLOOD BY AUTOMATED COUNT: 13.6 % (ref 11.5–14.5)
GFR SERPL CREATININE-BSD FRML MDRD: >90 ML/MIN/1.73M*2
GLUCOSE SERPL-MCNC: 89 MG/DL (ref 74–99)
HCT VFR BLD AUTO: 42.8 % (ref 36–46)
HGB BLD-MCNC: 14 G/DL (ref 12–16)
IMM GRANULOCYTES # BLD AUTO: 0.03 X10*3/UL (ref 0–0.5)
IMM GRANULOCYTES NFR BLD AUTO: 0.4 % (ref 0–0.9)
LYMPHOCYTES # BLD AUTO: 1.23 X10*3/UL (ref 0.8–3)
LYMPHOCYTES NFR BLD AUTO: 15.1 %
MCH RBC QN AUTO: 32.6 PG (ref 26–34)
MCHC RBC AUTO-ENTMCNC: 32.7 G/DL (ref 32–36)
MCV RBC AUTO: 100 FL (ref 80–100)
MONOCYTES # BLD AUTO: 0.66 X10*3/UL (ref 0.05–0.8)
MONOCYTES NFR BLD AUTO: 8.1 %
NEUTROPHILS # BLD AUTO: 6.14 X10*3/UL (ref 1.6–5.5)
NEUTROPHILS NFR BLD AUTO: 75.6 %
NRBC BLD-RTO: 0 /100 WBCS (ref 0–0)
PLATELET # BLD AUTO: 252 X10*3/UL (ref 150–450)
POTASSIUM SERPL-SCNC: 4.4 MMOL/L (ref 3.5–5.3)
PROT SERPL-MCNC: 6.8 G/DL (ref 6.4–8.2)
RBC # BLD AUTO: 4.29 X10*6/UL (ref 4–5.2)
SODIUM SERPL-SCNC: 140 MMOL/L (ref 136–145)
WBC # BLD AUTO: 8.1 X10*3/UL (ref 4.4–11.3)

## 2024-01-05 PROCEDURE — 82306 VITAMIN D 25 HYDROXY: CPT

## 2024-01-05 PROCEDURE — 80053 COMPREHEN METABOLIC PANEL: CPT

## 2024-01-05 PROCEDURE — 85025 COMPLETE CBC W/AUTO DIFF WBC: CPT

## 2024-01-05 PROCEDURE — 36415 COLL VENOUS BLD VENIPUNCTURE: CPT

## 2024-01-24 LAB
MGC ASCENT PFT - FEV1 - POST: 2.24
MGC ASCENT PFT - FEV1 - PRE: 2.05
MGC ASCENT PFT - FEV1 - PREDICTED: 1.97
MGC ASCENT PFT - FVC - POST: 2.61
MGC ASCENT PFT - FVC - PRE: 2.48
MGC ASCENT PFT - FVC - PREDICTED: 2.54

## 2024-01-30 ENCOUNTER — LAB (OUTPATIENT)
Dept: LAB | Facility: LAB | Age: 74
End: 2024-01-30
Payer: COMMERCIAL

## 2024-01-30 ENCOUNTER — OFFICE VISIT (OUTPATIENT)
Dept: RHEUMATOLOGY | Facility: CLINIC | Age: 74
End: 2024-01-30
Payer: COMMERCIAL

## 2024-01-30 VITALS
BODY MASS INDEX: 32.59 KG/M2 | DIASTOLIC BLOOD PRESSURE: 70 MMHG | SYSTOLIC BLOOD PRESSURE: 128 MMHG | WEIGHT: 184 LBS | HEART RATE: 67 BPM

## 2024-01-30 DIAGNOSIS — M60.9 MYOSITIS, UNSPECIFIED MYOSITIS TYPE, UNSPECIFIED SITE: ICD-10-CM

## 2024-01-30 DIAGNOSIS — M60.9 MYOSITIS, UNSPECIFIED MYOSITIS TYPE, UNSPECIFIED SITE: Primary | ICD-10-CM

## 2024-01-30 LAB
ALBUMIN SERPL BCP-MCNC: 4.2 G/DL (ref 3.4–5)
ALP SERPL-CCNC: 53 U/L (ref 33–136)
ALT SERPL W P-5'-P-CCNC: 16 U/L (ref 7–45)
ANION GAP SERPL CALC-SCNC: 11 MMOL/L (ref 10–20)
AST SERPL W P-5'-P-CCNC: 26 U/L (ref 9–39)
BASOPHILS # BLD AUTO: 0.03 X10*3/UL (ref 0–0.1)
BASOPHILS NFR BLD AUTO: 0.4 %
BILIRUB SERPL-MCNC: 0.5 MG/DL (ref 0–1.2)
BUN SERPL-MCNC: 15 MG/DL (ref 6–23)
CALCIUM SERPL-MCNC: 9.6 MG/DL (ref 8.6–10.3)
CHLORIDE SERPL-SCNC: 104 MMOL/L (ref 98–107)
CK SERPL-CCNC: 137 U/L (ref 0–215)
CO2 SERPL-SCNC: 29 MMOL/L (ref 21–32)
CREAT SERPL-MCNC: 0.57 MG/DL (ref 0.5–1.05)
CRP SERPL-MCNC: 0.21 MG/DL
EGFRCR SERPLBLD CKD-EPI 2021: >90 ML/MIN/1.73M*2
EOSINOPHIL # BLD AUTO: 0.02 X10*3/UL (ref 0–0.4)
EOSINOPHIL NFR BLD AUTO: 0.2 %
ERYTHROCYTE [DISTWIDTH] IN BLOOD BY AUTOMATED COUNT: 13.2 % (ref 11.5–14.5)
GLUCOSE SERPL-MCNC: 85 MG/DL (ref 74–99)
HCT VFR BLD AUTO: 43.4 % (ref 36–46)
HGB BLD-MCNC: 14 G/DL (ref 12–16)
IMM GRANULOCYTES # BLD AUTO: 0.02 X10*3/UL (ref 0–0.5)
IMM GRANULOCYTES NFR BLD AUTO: 0.2 % (ref 0–0.9)
LYMPHOCYTES # BLD AUTO: 0.92 X10*3/UL (ref 0.8–3)
LYMPHOCYTES NFR BLD AUTO: 11.4 %
MCH RBC QN AUTO: 32 PG (ref 26–34)
MCHC RBC AUTO-ENTMCNC: 32.3 G/DL (ref 32–36)
MCV RBC AUTO: 99 FL (ref 80–100)
MONOCYTES # BLD AUTO: 0.5 X10*3/UL (ref 0.05–0.8)
MONOCYTES NFR BLD AUTO: 6.2 %
NEUTROPHILS # BLD AUTO: 6.61 X10*3/UL (ref 1.6–5.5)
NEUTROPHILS NFR BLD AUTO: 81.6 %
NRBC BLD-RTO: 0 /100 WBCS (ref 0–0)
PLATELET # BLD AUTO: 263 X10*3/UL (ref 150–450)
POTASSIUM SERPL-SCNC: 4.4 MMOL/L (ref 3.5–5.3)
PROT SERPL-MCNC: 6.9 G/DL (ref 6.4–8.2)
RBC # BLD AUTO: 4.38 X10*6/UL (ref 4–5.2)
SODIUM SERPL-SCNC: 140 MMOL/L (ref 136–145)
WBC # BLD AUTO: 8.1 X10*3/UL (ref 4.4–11.3)

## 2024-01-30 PROCEDURE — 1126F AMNT PAIN NOTED NONE PRSNT: CPT | Performed by: INTERNAL MEDICINE

## 2024-01-30 PROCEDURE — 80053 COMPREHEN METABOLIC PANEL: CPT

## 2024-01-30 PROCEDURE — 99214 OFFICE O/P EST MOD 30 MIN: CPT | Performed by: INTERNAL MEDICINE

## 2024-01-30 PROCEDURE — 1157F ADVNC CARE PLAN IN RCRD: CPT | Performed by: INTERNAL MEDICINE

## 2024-01-30 PROCEDURE — 1036F TOBACCO NON-USER: CPT | Performed by: INTERNAL MEDICINE

## 2024-01-30 PROCEDURE — 1159F MED LIST DOCD IN RCRD: CPT | Performed by: INTERNAL MEDICINE

## 2024-01-30 PROCEDURE — 36415 COLL VENOUS BLD VENIPUNCTURE: CPT

## 2024-01-30 PROCEDURE — 82085 ASSAY OF ALDOLASE: CPT

## 2024-01-30 PROCEDURE — 85025 COMPLETE CBC W/AUTO DIFF WBC: CPT

## 2024-01-30 PROCEDURE — 82550 ASSAY OF CK (CPK): CPT

## 2024-01-30 PROCEDURE — 86140 C-REACTIVE PROTEIN: CPT

## 2024-01-30 NOTE — PROGRESS NOTES
"         Recall  Patient last seen by me in 2020 at  for MCTD with myositis, sicca symptoms, inflammatory arthritis and MGUS.. I reviewed her old chart     70 year old female with PMH significant for Hashimoto's (+microsomal Ab 2017),  hypertension and hyperlipidemia who presents for follow up of muscle weakness  and elevated CPK     Last seen 3/12/20    Was in her usual state of health - feeling very well, very active at baseline.     stressfully event in July 2019. Bedbugs in her building. She was moving and  lifting a lot of furniture. No other preceding injury, infection or new  medication.  One day in late July she woke up with stiff neck, thought she slept on it  strangely. Within a matter of days her hands \"blew up\". \"Fingers looked like  sausages with water balloons on the end\". Knees looked like water balloons.  Terrible pain everywhere - shoulders, couldn't move her neck. She was terrible  pain.     [place] and felt she had a ?bull's eye rash, no clear  tick bite. Lyme has been negative x2.  8/2019: normal CBCd and CMP, positive GLADYS by EIA, normal CK, RF 16, ESR 47  11/2019: saw rheumatologist Alena Gomes, was told she had RA. She did not  go back  12/16/19: Saw Dr. Ansari in rheumatology- GLADYS > 1:1280, speckled, negative JUSTIN,  dsDNA, ACPA, RF, normal C3, C4, ESR and CRP.     Seen by ortho, felt to have trace effusions both knees but did not feel  aspiration would be helpful. Offered steroid injection but patient declined.  Dr. Fitzpatrick did feel that she had chondrocalcinosis     Has been following with functional medicine. Felt she may have an unresolved  infection. patient has since had a tooth pulled     Seen by Dr. Jackson on 2/3 who felt she had triggering in fingers on both  hands. X-rays done in November revealed degenerative changes in right AC joint,  bilateral knee degenerative changes L>R with chondrocalcinosis and mild  degenerative changes both wrists and " hands with chondrocalcinosis both wrists.  Also mild DJD lumbar spine       DXA 6/12/19: lowest T-score -1.5. [id number] 58.8     Saw someone at  re: Lyme - positive Igenex. Traditional lyme test negative.  doxycylcine x 2 weeks reportedly improved swelling in her fingers and knees     Has been following with ortho regarding her hand pain and numbness  Also likely adhesive capsulitis R shoulder - referred to OT  Right shoulder US showed rotator cuff tendinosis with small supraspinous tendon  tear, and biceps tenosynovitis and subacromial subdeltoid bursitis.  Finger US: focal tendinosis of 3rd and 4th flexor tendons with hyperemia on the  right. Thickening of the 3rd and 4th digit A1 pulleys on the right. Same on the  left but involving 2nd and 3rd digits.  Ultrasounds of feet and ankles revealed mild synovitis L 5th MTP with minimal  hyperemia, felt to be degenerative in nature.    CXR - partial atelectasis along medial bases. Prominence of lung markings  bilaterally. Low lung volume. No effusion. Emphysematous changes suspected in  upper zones.     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  Normal CBC diff, CMP and urinalysis  GLADYS >1:1280, negative JUSTIN, Th/To, PM-Scl and RNA Brandyn III  Component Latest Ref Rng AND Units 12/19/2019 2/5/2020 6/25/2020 7/6/2020  CRP <0.9 mg/dL 0.3 0.3 0.1  WSR 0 - 20 mm/hr 17 31 (H) 8  CK 42 - 196 U/L 421 (H) 1,411 (H) 1,232 (H)  Aldolase 1.5 - 8.1 U/L 12.0 (H) 17.6 (H) 22.4 (H)     We spoke in June and she had been started on prednisone 20 mg on 6/25/2020 by Dr. Pierson at  for possible PMR  Has been seeing Dr. Jacome at [facility], last on 7/27, down to prednisone 10 mg  with some improvement in her symptoms  Myositis panel ordered - resulted but I cant see the results  Dr. Jacome discussed malignancy screening and patient declined mammogram and  colonoscopy. patient has not had mammogram since the 1980s and has never had a  colonoscopy. She ordered CT neck, abdomen,  pelvis and DXA       She continue prednisone 20 mg until 7/7, saw her CPK had come down so she  decreased her prednisone to 10 mg.  7/27/20: CRP < 0.5, normal BMP. ALT 59, AST 92, T bili 0.7, albumin 3.3, normal  CBC , CPK 1,524  Negative dsDNA, SSA 1.2, remainder JUSTIN panel negative. SPEP +polyclonal  gammopathy, ESR 31. Repeat anti-SSA antibody was elevated, and anti-DNA antibody   8/12/23  CT neck W report - no adenopathy  Dr. Jacome increased her prednisone again to 7/28  She does not like the weight gain and currently on 15 mg daily for the past 4  days  Current IS:  mg/day and prednisone 7.5 mg daily  -Reviewed Dr. Duncan's notes and recommend switching AZA to CellCept. Patient agreed.   -Switched AZA to Cellcept. Current dose is 1000 mg BID. We will switch to Myfortic 360 mg 2 pills BID due to side effects with cellcept (can go up to 1080 mg BID)  -Updated labs 12/2023 normal and she started tapering pred by 1 mg q 4 weeks. She is now on 7.5 mg prednisone.    -Reviewed labs from 12/2023: CMP/CBC normal.  CRP 1.79. ESR:12, CK 95, Aldolase: 3.7.Repeat ESR/CRP/CK/aldolase in 1/2024 normal.     Ku ANTIBODY Positive. 4/2023     Chief Complaint: Follow up of inflammatory myopathy and ILD.     History of Present Illness: At today's visit, the patient reports taking prednisone 6 mg/day. She is reducing prednisone 0.5 mg /week. She has no weakness or difficulty coming her hair or getting out of chair. No SOB or cough          Review of Systems    Constitutional: Has fatigue  Head: Denies headaches or hair loss  Eyes: Has dry eyes  ENT: Has dry mouth  Cardiovascular: Denies chest pain, palpitations  Respiratory:Has SOB  Gastrointestinal: Denies nausea, vomiting, heartburn, abdominal pain , diarrhea or blood in the stool  Integumentary: Denies photosensitivity, rash or lesions, or psoriasis  Neurological: Has weakness and numbness  MSK: Has joint pain and morning stiffness            Active Problems  Problems     · Abnormal TSH (790.6) (R79.89)   · Acute bronchitis (466.0) (J20.9)   · Breast cancer screening (V76.10) (Z12.39)   · Cough (786.2) (R05.9)   · Edema (782.3) (R60.9)   · Encounter for immunization (V03.89) (Z23)   · Herpes zoster without complication (053.9) (B02.9)   · Initial Medicare annual wellness visit (V70.0) (Z00.00)   · Localized edema (782.3) (R60.0)   · Mild vitamin D deficiency (268.9) (E55.9)   · Myalgia, unspecified site (729.1) (M79.10)   · Myopathy (359.9) (G72.9)   · Osteoarthritis of hip (715.95) (M16.9)   · Pain of right hip joint (719.45) (M25.551)   · Polyarthritis (716.50) (M13.0)   · Positive Lyme disease serology (795.79) (R76.8)   · Stiffness of joints of both hands (719.58) (M25.641,M25.642)     Past Medical History  Problems    · No pertinent past medical history (V49.89) (Z78.9)     Surgical History  Problems    · History of Cyst excision     Family History  Mother    · Family history of    · Family history of malignant neoplasm of kidney (V16.51) (Z80.51)  Father    · Family history of    · Family history of chronic kidney disease (V18.69) (Z84.1)   · Family history of malignant neoplasm of kidney (V16.51) (Z80.51)     Social History  Problems    ·  (V61.03) (Z63.5)   · daughter nearby   · Has 1 child   · Never a smoker   · No alcohol use   · Retired from employment   · previously varioous jobs, 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      cholecalciferol (Vitamin D3) 50 mcg (2,000 unit) capsule Take 1 capsule (2,000 Units) by mouth once daily.      cyanocobalamin (Vitamin B-12) 1,000 mcg tablet Take 1 tablet (1,000 mcg) by mouth once daily.      dofetilide (Tikosyn) 250 mcg capsule Take 1  capsule (250 mcg) by mouth twice a day.      Eliquis 5 mg tablet Take 1 tablet (5 mg) by mouth 2 times a day.      fish oil concentrate (Fish OiL) 120-180 mg capsule Take by mouth.      magnesium 30 mg tablet Take 1 tablet (30 mg) by mouth once daily.      melatonin 5 mg tablet Take 1 tablet (5 mg) by mouth once daily at bedtime.      mycophenolate (Myfortic) 360 mg EC tablet Take 2 tablets (720 mg) by mouth 2 times a day. 120 tablet 1    pantoprazole (ProtoNix) 40 mg EC tablet Take 1 tablet (40 mg) by mouth once daily. Do not crush, chew, or split. 30 tablet 11    predniSONE (Deltasone) 1 mg tablet Take 4 tablets (4 mg) by mouth once daily. 60 tablet 2    predniSONE (Deltasone) 2.5 mg tablet Take 1 tablet (2.5 mg) by mouth once daily. 60 tablet 1    vit B complex 100 combo no.2 (B-100 COMPLEX ORAL) Take 1 capsule by mouth once daily.       No current facility-administered medications for this visit.          Vitals  Blood pressure 128/70, pulse 67, weight 83.5 kg (184 lb).       Physical Exam    General - NAD, sitting up in chair, well-groomed, pleasant, AAOx3  Head: Normocephalic, atraumatic  Eyes - PERRLA, EOMI. No conjunctiva injection.   Mouth/ENT - Moist oral and nasal mucosa.   Cardiovascular - Normal S1, S2. Regular rate and rhythm. No murmurs or rubs.  Lungs - Symmetric chest expansion. Clear to auscultation bilaterally.   Skin - No rashes or ulcers. Skin warm and dry. No erythema on bilateral cheeks.  Neurological - Alert and oriented x 3, grossly intact. No focal deficit.  Musculoskeletal -  EXAMJOINTDETAILED,  Shoulders: Painful rotation of shoulders  Elbows: Full ROM, without pain, no swelling, warmth or tenderness.  Wrists: Full ROM, without pain, no swelling, warmth or tenderness.  MCP: No swelling, warmth or tenderness. Metacarpal squeeze negative  PIP: No swelling, warmth or tenderness.  DIP: No swelling, warmth or tenderness.  Hands : 5/5.       Component      Latest Ref Rng 1/2/2024 1/5/2024    WBC      4.4 - 11.3 x10*3/uL  8.1    nRBC      0.0 - 0.0 /100 WBCs  0.0    RBC      4.00 - 5.20 x10*6/uL  4.29    HEMOGLOBIN      12.0 - 16.0 g/dL  14.0    HEMATOCRIT      36.0 - 46.0 %  42.8    MCV      80 - 100 fL  100    MCH      26.0 - 34.0 pg  32.6    MCHC      32.0 - 36.0 g/dL  32.7    RED CELL DISTRIBUTION WIDTH      11.5 - 14.5 %  13.6    Platelets      150 - 450 x10*3/uL  252    Neutrophils %      40.0 - 80.0 %  75.6    Immature Granulocytes %, Automated      0.0 - 0.9 %  0.4    Lymphocytes %      13.0 - 44.0 %  15.1    Monocytes %      2.0 - 10.0 %  8.1    Eosinophils %      0.0 - 6.0 %  0.6    Basophils %      0.0 - 2.0 %  0.2    Neutrophils Absolute      1.60 - 5.50 x10*3/uL  6.14 (H)    Immature Granulocytes Absolute, Automated      0.00 - 0.50 x10*3/uL  0.03    Lymphocytes Absolute      0.80 - 3.00 x10*3/uL  1.23    Monocytes Absolute      0.05 - 0.80 x10*3/uL  0.66    Eosinophils Absolute      0.00 - 0.40 x10*3/uL  0.05    Basophils Absolute      0.00 - 0.10 x10*3/uL  0.02    GLUCOSE      74 - 99 mg/dL  89    SODIUM      136 - 145 mmol/L  140    POTASSIUM      3.5 - 5.3 mmol/L  4.4    CHLORIDE      98 - 107 mmol/L  102    Bicarbonate      21 - 32 mmol/L  31    Anion Gap      10 - 20 mmol/L  11    Blood Urea Nitrogen      6 - 23 mg/dL  13    Creatinine      0.50 - 1.05 mg/dL  0.59    EGFR      >60 mL/min/1.73m*2  >90    Calcium      8.6 - 10.3 mg/dL  9.4    Albumin      3.4 - 5.0 g/dL  4.1    Alkaline Phosphatase      33 - 136 U/L  51    Total Protein      6.4 - 8.2 g/dL  6.8    AST      9 - 39 U/L  25    Bilirubin Total      0.0 - 1.2 mg/dL  0.4    ALT      7 - 45 U/L  14    C-Reactive Protein      <1.00 mg/dL 0.24     Creatine Kinase      0 - 215 U/L 137     Vitamin D, 25-Hydroxy, Total      30 - 100 ng/mL  51         FINAL DIAGNOSIS  A, B.  LEFT DELTOID AND RECTUS FEMORIS MUSCLES, BIOPSIES:  --INFLAMMATORY MYOPATHY, SEE NOTE.    Note: Histopathologic features are qualitatively similar within the  biopsy  specimens, however, they are quantitatively more severe within the rectus  femoris muscle.  The biopsies demonstrate both endomysial and  perimysial/vascular inflammation composed predominantly of CD3 positive T  cells, accompanied by muscle fiber necrosis with regeneration (highlighted with  antibodies to P62, CD68, and CD56).  Trichrome staining demonstrates no ragged  red fibers or rimmed vacuoles, however, it highlights perimysial fibrosis in  the rectus femoris muscle.  Myosin heavy chain immunostains show no fiber type  grouping.  Cytochrome oxidase staining is within normal limits, and Congo red  stain for amyloid is negative.        6/2023  RESULTS:     Lumbar spine (L1-L4): 0.886 g/cm2, T-score -2.5, Z-score -0.8     Right Femoral Neck: 0.765 g/cm2, T-score -2.0, Z-score -0.1   Right Total Hip: 0.656 g/cm2, T-score -2.8, Z-score -1.2     Left Femoral Neck: 0.659 g/cm2, T-score -2.7, Z-score -0.9   Left Total Hip: 0.729 g/cm2, T-score -2.2, Z-score -0.6        74 yr old with MCTD with inflammatory myositis and ILD. She is in remission.   Long term use of steroids: She is reluctant to take bisphosphonates.  Follow up with Dr. Maguire.         electronically signed by Lisbeth Jacome

## 2024-02-01 LAB — ALDOLASE SERPL-CCNC: 4.1 U/L (ref 1.2–7.6)

## 2024-02-02 ENCOUNTER — TELEPHONE (OUTPATIENT)
Dept: PULMONOLOGY | Facility: CLINIC | Age: 74
End: 2024-02-02
Payer: COMMERCIAL

## 2024-02-02 NOTE — TELEPHONE ENCOUNTER
Dr. Duncan,    Patient called.    Last seen you 11/8/23.    You ordered a CT Chest w/o IV Contrast.    Patient is due to see you again 2/20/24.    Patient's last CT of Chest was 6/17/23 with [facility].    Patient would like to know if you want CT done before her next visit with you or closer to June /2024?    Please advise.

## 2024-02-13 ENCOUNTER — APPOINTMENT (OUTPATIENT)
Dept: PULMONOLOGY | Facility: CLINIC | Age: 74
End: 2024-02-13
Payer: COMMERCIAL

## 2024-02-15 ENCOUNTER — HOSPITAL ENCOUNTER (OUTPATIENT)
Dept: RADIOLOGY | Facility: CLINIC | Age: 74
Discharge: HOME | End: 2024-02-15
Payer: COMMERCIAL

## 2024-02-15 DIAGNOSIS — Q99.8 MYOSITIS ASSOCIATED ANTIBODY POSITIVE (HHS-HCC): ICD-10-CM

## 2024-02-15 DIAGNOSIS — J84.9 ILD (INTERSTITIAL LUNG DISEASE) (MULTI): ICD-10-CM

## 2024-02-15 PROCEDURE — 71250 CT THORAX DX C-: CPT | Performed by: RADIOLOGY

## 2024-02-15 PROCEDURE — 71250 CT THORAX DX C-: CPT

## 2024-02-15 NOTE — PROGRESS NOTES
Pulmonary Consult    Request of Pulmonary Consult by No ref. provider found, [unfilled] to evaluate [unfilled] for {AHGREASONSFORVISIT (Optional):96107}. I have independently interviewed and examined the patient in the office and reviewed available records.    Physician HPI (2/15/2024):    Immunization History:  Immunization History   Administered Date(s) Administered    Moderna COVID-19 vaccine, bivalent, blue cap/gray label *Check age/dose* 07/21/2023    Moderna SARS-CoV-2 Vaccination 07/29/2022    Pfizer Purple Cap SARS-CoV-2 01/28/2021, 02/18/2021, 12/20/2021    Pneumococcal conjugate vaccine, 20-valent (PREVNAR 20) 07/27/2023    Zoster vaccine, recombinant, adult (SHINGRIX) 06/03/2021, 06/04/2021, 09/16/2021       Family History:  Family History   Problem Relation Name Age of Onset    Kidney cancer Mother      Kidney disease Father      Kidney cancer Father         Social History:  Social History     Socioeconomic History    Marital status: Single     Spouse name: Not on file    Number of children: Not on file    Years of education: Not on file    Highest education level: Not on file   Occupational History    Not on file   Tobacco Use    Smoking status: Never    Smokeless tobacco: Never   Substance and Sexual Activity    Alcohol use: Not Currently    Drug use: Never    Sexual activity: Defer   Other Topics Concern    Not on file   Social History Narrative    Not on file     Social Determinants of Health     Financial Resource Strain: Not on file   Food Insecurity: Not on file   Transportation Needs: Not on file   Physical Activity: Not on file   Stress: Not on file   Social Connections: Not on file   Intimate Partner Violence: Not on file   Housing Stability: Not on file       Current Medications:  Current Outpatient Medications   Medication Instructions    bisoprolol (Zebeta) 5 mg tablet 1.5 tablets, oral, Nightly    cholecalciferol (Vitamin D3) 50 mcg (2,000 unit) capsule 1 capsule, oral, Daily     cyanocobalamin (VITAMIN B-12) 1,000 mcg, oral, Daily    dofetilide (TIKOSYN) 250 mcg, oral, 2 times daily    Eliquis 5 mg tablet 1 tablet, oral, 2 times daily    fish oil concentrate (Fish OiL) 120-180 mg capsule oral    magnesium 30 mg, oral, Daily    melatonin 5 mg, oral, Nightly    pantoprazole (PROTONIX) 40 mg, oral, Daily, Do not crush, chew, or split.    predniSONE (DELTASONE) 4 mg, oral, Daily    vit B complex 100 combo no.2 (B-100 COMPLEX ORAL) 1 capsule, oral, Daily        Drug Allergies/Intolerances:  No Known Allergies     Review of Systems:  Review of Systems     All other review of systems are negative and/or non-contributory.    Physical Examination:  There were no vitals taken for this visit.     General: ambulated independently; no acute distress; well-nourished; work of breathing was not increased; normal vocal character  HEENT: normocephalic; anicteric sclerae; conjunctivae not injected; nasal mucosa was unremarkable; oropharynx was clear without evidence of thrush; dentition was good.  Neck: supple; no lymphadenopathy or thyromegaly.  Chest: clear to auscultation bilaterally; no chest wall deformity.  Cardiac: regular rhythm; no gallop or murmur.  Abdomen: soft; non-tender; non-distended; no hepatosplenomegaly.  Extremities: no leg edema; no digital clubbing; 2+ pulses  Psychiatric: did not appear depressed or anxious.    Pulmonary Function Test Results     12/14/23    Study Result    Narrative & Impression   The FEV1/FVC is normal. The FEV1 is normal. The FVC is normal. The TLC by body plethysmography is normal. The DLCO is normal; however, the diffusing capacity was not corrected for the patient's hemoglobin. The airways resistance is normal. Following administration of bronchodilators, there is no significant response. Conclusion: Normal spirometry. Normal lung volumes by plethysmography. The DLCO is normal.The patient walked 220 meters during the test. Minimum Spo2 during the test was 93%, no  supplemental oxygen was required during the test.       Chest Radiograph     XR CHEST 2 VIEWS 04/13/2023    Narrative  * * *Final Report* * *    DATE OF EXAM: Apr 13 2023  1:36PM    FVX   [id number]  -  XR CHEST 2V FRONTAL/LAT  / ACCESSION #  [id number]    PROCEDURE REASON: multiple diagnoses    * * * * Physician Interpretation * * * *    EXAMINATION:  CHEST RADIOGRAPH (2 VIEW FRONTAL & LATERAL)    CLINICAL HISTORY: Paroxysmal atrial fibrillation (HCC) Systolic  dysfunction  MQ:  [id number]    EXAM DATE/TIME:  4/13/2023 1:36 PM    COMPARISON:  10/11/2022      RESULT:    Lines, tubes, and devices:  None.    Lungs and pleura:  No consolidation. No lung mass. No pleural effusion.  No pneumothorax.    Cardiomediastinal silhouette:  Normal cardiomediastinal silhouette.    Bones and soft tissues:  Unremarkable.    Impression  IMPRESSION:    No acute radiographic abnormality.                : BLADIMIR  Transcribe Date/Time: Apr 13 2023 10:22P    Dictated by : ION JONES MD    This examination was interpreted and the report reviewed and  electronically signed by:  ION JONES MD on Apr 13 2023 10:22PM  EST      Echocardiogram     No Testing DOne       Chest CT Scan     CT Chest 2/15/24 - Results will need pulled in        Co-morbidities Problem List    Assessment and Plan / Recommendations:  Problem List Items Addressed This Visit    None       MANJU ESQUIVEL MA  02/20/2024

## 2024-02-20 ENCOUNTER — APPOINTMENT (OUTPATIENT)
Dept: PULMONOLOGY | Facility: CLINIC | Age: 74
End: 2024-02-20
Payer: COMMERCIAL

## 2024-02-22 NOTE — PROGRESS NOTES
Rheumatology Outpatient Follow up Note  Virtual or Telephone Consent    An interactive audio and video telecommunication system which permits real time communications between the patient (at the originating site) and provider (at the distant site) was utilized to provide this telehealth service.   Verbal consent was requested and obtained from Nora Bynum on this date, 02/27/24 for a telehealth visit.     Subjective   Nora Bynum is a 74 y.o. female presenting today for Joint Pain.    History of Presenting Problem:   Rheum hx (Recall from previous notes):  Previously saw Dr. Jacome at [facility] since 2019. Nml CK, RF 16, ESR 47. Then saw Dr. Gomes and Elan (12/2019 GLADYS > 1:1280, negative JUSTIN, nml C3/C4, RF/CCP negative)   Sjogrens, SSA+ , Raynauds, dry mouth since 2019 June 2022, Prednisone 20mg daily 6/25/23 by Dr. Pierson for possible PMR (also hx of elevated CK up to 1411 previously), followed up with Dr. 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 EMG done by Dr. Thakur (Neurology) and recommended for muscle biopsy. She underwent MM bx 5/17/23 for Left deltoid and left rectus femoris. Pt was called by Dr. 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      She followed up with Alphonso Stanton on 5/30/23: On Pred 60mg daily and Azathioprine 150mg daily. Muscle pain improved, steorid taper by 10mg in 4 weeks, then 50mg X3 weeks, then 40mg X2 weeks. Continue AZA 150mg daily.     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 Duncan and pending repeat PFT and recommended switching AZA to Cellcept    Current IS:  - Prednisone 60mg 5/25/23, then tapering every 4 weeks by 10mg, then  1 mg q 2 weeks. Briefly restarted 10 mg due to transition from AZA to Cellcept. Now 5 mg  - Myfortic 720 mg BID      Interval history:  She is currently on 5 mg prednisone and Myfortic 720 mg BID   GI symptoms improved. She reports that her weakness has improved significantly overall with steroids/Myfortic . No new joint swelling. No morning stiffness. No skin rashes/hair loss. No dysphagia. No SOB. She has chronic cough that may be slightly getting worse in the last few weeks.              Past Medical History:   Past Medical History:   Diagnosis Date    Other specified health status     No pertinent past medical history       Allergies: No Known Allergies    Medications:   Current Outpatient Medications:     bisoprolol (Zebeta) 5 mg tablet, Take 1.5 tablets (7.5 mg) by mouth once daily at bedtime., Disp: , Rfl:     cholecalciferol (Vitamin D3) 50 mcg (2,000 unit) capsule, Take 1 capsule (2,000 Units) by mouth once daily., Disp: , Rfl:     cyanocobalamin (Vitamin B-12) 1,000 mcg tablet, Take 1 tablet (1,000 mcg) by mouth once daily., Disp: , Rfl:     dofetilide (Tikosyn) 250 mcg capsule, Take 1 capsule (250 mcg) by mouth twice a day., Disp: , Rfl:     Eliquis 5 mg tablet, Take 1 tablet (5 mg) by mouth 2 times a day., Disp: , Rfl:     fish oil concentrate (Fish OiL) 120-180 mg capsule, Take by mouth., Disp: , Rfl:     magnesium 30 mg tablet, Take 1 tablet (30 mg) by mouth once daily., Disp: , Rfl:     melatonin 5 mg tablet, Take 1 tablet (5 mg) by mouth once daily at bedtime., Disp: , Rfl:     pantoprazole (ProtoNix) 40 mg EC tablet, Take 1 tablet (40 mg) by mouth once daily. Do not crush, chew, or split., Disp: 30 tablet, Rfl: 11    predniSONE (Deltasone) 1 mg tablet, Take 4 tablets (4 mg) by mouth once daily., Disp: 60 tablet, Rfl: 2    vit B complex 100 combo no.2 (B-100 COMPLEX ORAL), Take 1 capsule by mouth once daily., Disp: , Rfl:     Review of Systems:   All 10 review of systems have been reviewed and are  "negative for complaint except as noted in the HPI    Objective   Physical Examination:  There were no vitals filed for this visit.    Growth %ile SmartLinks can only be used for patients less than 20 years old.  Ht Readings from Last 1 Encounters:   23 1.6 m (5' 3\")     Wt Readings from Last 1 Encounters:   24 83.5 kg (184 lb)       Exam limited, virtual visit        Laboratory Testin2023: GLADYS 1:2560 speckled, anti chromatin +, anti Ku + other myositis panel negative    2023 ESR 39, CRP <0.4 mg/dL  2023 CK 1014 --> 435 --> 349 --> 141 --> 78 ()   Aldolase 22.3 --> 8.7 --> 5.0 ()  10/2023 CPK 97, aldolase 5.7, CBC shows elevated hematocrit and MCV    2023 Negative TB/hepatitis tests    Muscle biopsy 2023:   FINAL DIAGNOSIS  A, B.  LEFT DELTOID AND RECTUS FEMORIS MUSCLES, BIOPSIES:  --INFLAMMATORY MYOPATHY, SEE NOTE.    Note: Histopathologic features are qualitatively similar within the biopsy  specimens, however, they are quantitatively more severe within the rectus  femoris muscle.  The biopsies demonstrate both endomysial and  perimysial/vascular inflammation composed predominantly of CD3 positive T  cells, accompanied by muscle fiber necrosis with regeneration (highlighted with  antibodies to P62, CD68, and CD56).  Trichrome staining demonstrates no ragged  red fibers or rimmed vacuoles, however, it highlights perimysial fibrosis in  the rectus femoris muscle.  Myosin heavy chain immunostains show no fiber type  grouping.  Cytochrome oxidase staining is within normal limits, and [place] red  stain for amyloid is negative.       CT chest 2023:  1.  Mild subpleural reticulation and minimal subpleural ground glass   opacities most significant in the lower lobes bilaterally.  Suggest a   degree of fibrosis but unchanged from 2021.     2.  0.3 cm nodule in the left lower lobe, new from prior study.  May be   infectious or inflammatory recommend follow-up in 6-12 months.     MRI " pancreas 7/2023:  Pancreas: Pancreas is normal in precontrast T1 signal intensity with no   solid masses or main pancreatic duct dilatation.     Multiple unilocular cystic pancreatic lesions communicate with the main   pancreatic duct are noted for example a 1.3 cm cystic structure in the   pancreatic body (3:37). 3 mm cystic lesion at the junction of the   pancreatic body and tail (3:36). No worrisome imaging features.     Biliary: No intrahepatic or extrahepatic biliary ductal dilation.  No   choledocholithiasis or stricture.     Gallbladder: No cholelithiasis or gallbladder wall thickening.     Liver: 1 cm right hepatic lobe cyst. No enhancing mass.     No thrombus in the portal venous system (splenic vein, main portal vein,   left and right anterior and right posterior portal vein branches).     Spleen: Wedge-shaped area of T2 hypointense signal in the spleen may be   related to prior trauma.     Kidneys: Kidneys enhance symmetrically without hydroureteronephrosis or   enhancing renal masses. 8 mm thinly septated cyst interpolar right kidney.     Adrenal glands: Adrenal glands are normal.     Lymph nodes: No lymphadenopathy by size criteria.     Mesentery: No ascites.     Osseous structures: No aggressive osseous lesions.     PFT 12/2023:   The FEV1/FVC is normal. The FEV1 is normal. The FVC is normal. The TLC by body plethysmography is normal. The DLCO is normal; however, the diffusing capacity was not corrected for the patient's hemoglobin. The airways resistance is normal. Following administration of bronchodilators, there is no significant response. Conclusion: Normal spirometry. Normal lung volumes by plethysmography. The DLCO is normal.The patient walked 220 meters during the test. Minimum Spo2 during the test was 93%, no supplemental oxygen was required during the test.    HRCT 2/2024:   IMPRESSION:  Mild pulmonary fibrotic changes without honeycombing or  bronchiectasis.  Small hiatal hernia with  patulous distal esophagus.  Pancreatic cystic lesion in the body may be further assessed with pancreas MRI.        Assessment/Plan   Nora Bynum is a 74 y.o. female with PMHx of HTN, Afib, HLP, vitamin D deficiency, increased BMI, MCTD, knee OA, and lung fibrosis,  who is presenting today as a follow up for inflammatory myositis:       ##Inflammatory myopathy/ILD:  -Clinically: myopathy, inflammatory arthritis, Raynaud's, sicca and ILD. Extended myositis panel +Ku, MM biopsy. Elevated CK/aldolase/ESR previously  -Reviewed Dr. Duncan's notes and recommend switching AZA to CellCept. Patient agreed.   -Switched AZA to Cellcept. Dose was 1000 mg BID. Switched  to Myfortic 360 mg 2 pills BID due to side effects with cellcept (can go up to 1080 mg 3 pills BID)  -Tapering pred by 1 mg q 2-3 weeks. She is now on 5 mg prednisone.    -Reviewed labs from 2/2024: CMP/CBC/ESR/CRP/CK/aldolase normal.    -Updated PFT  12/2023 normal  -HRCT 2024: Mild pulmonary fibrotic changes without honeycombing or bronchiectasis. She will follow up with Dr Duncan  -Referred to physical therapy for muscle strengthening   -Saw general surgery for pancreas and recommended monitoring   -Has had prevnar, shingles and covid vaccinations      ##Medical management of high risk medication:  -Drug name: Myfortic  -GI side effects and CellCept switched to Myfortic  -Labs reviewed  -Most recent TB/hepatitis tests negative in 4/2023  -Educated the patient about risks and benefits of such therapy, and that the benefits of this therapy outweigh the risks and thus we will continue.    ##Steroids/chronic use:  -Educated the patient about potential toxicity from chronic steroid use (most importantly increased infections, diabetes, hypertension, osteoporosis, increased skin breakdown, psychosis..etc) and the need to taper it down as much as tolerated  -Advised to optimize bone health by increasing calcium and vitamin D dietary and supplementary  intake  -DEXA scan: followed by PCP      The assessment and plan, risk and benefits were discussed with the patient. All of the patients questions were answered and patient agrees to the plan.      Janis Greco MD  Clinical   Department of Rheumatology   [facility]

## 2024-02-27 ENCOUNTER — TELEMEDICINE (OUTPATIENT)
Dept: RHEUMATOLOGY | Facility: CLINIC | Age: 74
End: 2024-02-27
Payer: COMMERCIAL

## 2024-02-27 DIAGNOSIS — Z79.52 LONG TERM (CURRENT) USE OF SYSTEMIC STEROIDS: ICD-10-CM

## 2024-02-27 DIAGNOSIS — Z79.899 LONG-TERM USE OF HIGH-RISK MEDICATION: ICD-10-CM

## 2024-02-27 DIAGNOSIS — M60.9 MYOSITIS, UNSPECIFIED MYOSITIS TYPE, UNSPECIFIED SITE: ICD-10-CM

## 2024-02-27 DIAGNOSIS — M35.1 MCTD (MIXED CONNECTIVE TISSUE DISEASE) (MULTI): ICD-10-CM

## 2024-02-27 DIAGNOSIS — G72.49 IDIOPATHIC INFLAMMATORY MYOPATHY: Primary | ICD-10-CM

## 2024-02-27 DIAGNOSIS — M81.6 LOCALIZED OSTEOPOROSIS WITHOUT CURRENT PATHOLOGICAL FRACTURE: ICD-10-CM

## 2024-02-27 PROCEDURE — 1157F ADVNC CARE PLAN IN RCRD: CPT | Performed by: STUDENT IN AN ORGANIZED HEALTH CARE EDUCATION/TRAINING PROGRAM

## 2024-02-27 PROCEDURE — 1126F AMNT PAIN NOTED NONE PRSNT: CPT | Performed by: STUDENT IN AN ORGANIZED HEALTH CARE EDUCATION/TRAINING PROGRAM

## 2024-02-27 PROCEDURE — 1036F TOBACCO NON-USER: CPT | Performed by: STUDENT IN AN ORGANIZED HEALTH CARE EDUCATION/TRAINING PROGRAM

## 2024-02-27 PROCEDURE — 1159F MED LIST DOCD IN RCRD: CPT | Performed by: STUDENT IN AN ORGANIZED HEALTH CARE EDUCATION/TRAINING PROGRAM

## 2024-02-27 PROCEDURE — 99215 OFFICE O/P EST HI 40 MIN: CPT | Performed by: STUDENT IN AN ORGANIZED HEALTH CARE EDUCATION/TRAINING PROGRAM

## 2024-02-27 RX ORDER — MYCOPHENOLIC ACID 360 MG/1
720 TABLET, DELAYED RELEASE ORAL 2 TIMES DAILY
Qty: 120 TABLET | Refills: 11 | Status: SHIPPED | OUTPATIENT
Start: 2024-02-27 | End: 2024-04-30 | Stop reason: SDUPTHER

## 2024-02-27 RX ORDER — PREDNISONE 1 MG/1
4 TABLET ORAL DAILY
Qty: 120 TABLET | Refills: 2 | Status: SHIPPED | OUTPATIENT
Start: 2024-02-27

## 2024-02-29 NOTE — TELEPHONE ENCOUNTER
She already had a CT chest in February as scheduled. And also PFT done recently.  When I see her in the office visit we can discuss the results  What is the question

## 2024-04-12 NOTE — PROGRESS NOTES
Pulmonary Clinic Note  Follow up CTD-ILD   I have independently interviewed and examined the patient in the office and reviewed available records.    Physician CELESTINO (4/16/2024):  This a a 74 year old woman with diagnosis of  inflammatory myopathy (anti-Ku positive), Afib (eliquis), and osteoarthritis, Vitamin D deficiency, she was referred initially in 11/2023 to the Pulmonary clinic for suspected CTD-ILD  She come today 4/16/2024  for follow up visit , since previous visit she had a CT chest that showed Mild pulmonary fibrotic changes, her PFT shows reserved Pulmonary function  She denies any cough expectoration or shortness of breath,  She denies any fever chest wheezes or hemoptysis  She follows with rheumatology had labs shows CK level is stable with no recent exacerbation.  She denies any muscle weakness skin rash    Documentation of the initial encounter 11/2023  Her medical problems started from 2019 with severe muscle weakness and fatigue and pain of swelling of the fingers.  Her symptoms had been progressive with periods of regression on short course of steroids.  Her serological markers are positive for myositis markers antichromatin antinuclear antibody and antecubital are all positive also markedly elevated CK at 1000, She underwent MM bx 5/17/23 for Left deltoid and left rectus femoris  muscle biopsy that showed inflammatory myopathy with both endomysial and perimysial/vascular inflammation. Also found to have anti-Ku antibodies and fibrosis in the lungs. She was started on steroid 60 mg with very slow tapering down she is on 8.5 mg of prednisone and 50 mg 3 times daily of Imuran, her symptoms are stable .  She complains of minimal shortness of breath with activity but denies any dry or productive cough, no fever or chest pain  She denies any skin lesions rash., she complains of Foreign body sensation in the eye , and epigastric pain  No swelling of the joints but has right hip degenerative disease  requiring hip replacement.  She has CT chest done in June 2023 that showed subpleural reticulation also shows lesion in the pancreas and she is being seen by general surgery at [facility] for follow up.     Immunization History:  Immunization History   Administered Date(s) Administered    Moderna COVID-19 vaccine, bivalent, blue cap/gray label *Check age/dose* 07/21/2023    Pneumococcal conjugate vaccine, 20-valent (PREVNAR 20) 07/27/2023    Zoster vaccine, recombinant, adult (SHINGRIX) 06/03/2021, 06/04/2021, 09/16/2021       Family History:  Family History   Problem Relation Name Age of Onset    Kidney cancer Mother      Kidney disease Father      Kidney cancer Father         Social History:  Social History     Socioeconomic History    Marital status: Single     Spouse name: None    Number of children: None    Years of education: None    Highest education level: None   Occupational History    None   Tobacco Use    Smoking status: Never    Smokeless tobacco: Never   Substance and Sexual Activity    Alcohol use: Not Currently    Drug use: Never    Sexual activity: Defer   Other Topics Concern    None   Social History Narrative    None     Social Determinants of Health     Financial Resource Strain: Low Risk  (6/15/2023)    Received from [facility]    Overall Financial Resource Strain (CARDIA)     Difficulty of Paying Living Expenses: Not hard at all   Food Insecurity: No Food Insecurity (6/15/2023)    Received from [facility]    Hunger Vital Sign     Worried About Running Out of Food in the Last Year: Never true     Ran Out of Food in the Last Year: Never true   Transportation Needs: No Transportation Needs (6/15/2023)    Received from [facility]    PRAPARE - Transportation     Lack of Transportation (Medical): No     Lack of Transportation (Non-Medical): No   Physical Activity: Unknown (6/8/2022)    Received from [facility]    Exercise Vital Sign     Days of Exercise per Week: Patient declined      Minutes of Exercise per Session: Patient declined   Stress: No Stress Concern Present (6/8/2022)    Received from [facility]    [employer] - Occupational Stress Questionnaire     Feeling of Stress : Not at all   Social Connections: Moderately Integrated (6/8/2022)    Received from [facility]    Social Connection and Isolation Panel [NHANES]     Frequency of Communication with Friends and Family: More than three times a week     Frequency of Social Gatherings with Friends and Family: Patient declined     Attends Temple Services: More than 4 times per year     Active Member of Clubs or Organizations: Yes     Attends Club or Organization Meetings: More than 4 times per year     Marital Status:    Intimate Partner Violence: Not on file   Housing Stability: Low Risk  (6/15/2023)    Received from [facility]    Housing Stability Vital Sign     Unable to Pay for Housing in the Last Year: No     Number of Places Lived in the Last Year: 1     Unstable Housing in the Last Year: No       Current Medications:  Current Outpatient Medications   Medication Instructions    bisoprolol (Zebeta) 5 mg tablet 1.5 tablets, oral, Nightly    cholecalciferol (Vitamin D3) 50 mcg (2,000 unit) capsule 1 capsule, oral, Daily    cyanocobalamin (VITAMIN B-12) 1,000 mcg, oral, Daily    dofetilide (TIKOSYN) 250 mcg, oral, 2 times daily    Eliquis 5 mg tablet 1 tablet, oral, 2 times daily    fish oil concentrate (Fish OiL) 120-180 mg capsule oral    magnesium 30 mg, oral, Daily    melatonin 5 mg, oral, Nightly    mycophenolate (MYFORTIC) 720 mg, oral, 2 times daily    predniSONE (DELTASONE) 4 mg, oral, Daily    vit B complex 100 combo no.2 (B-100 COMPLEX ORAL) 1 capsule, oral, Daily        Drug Allergies/Intolerances:  No Known Allergies     Review of Systems:  No muscle pain  No skin rash  No arthritis    All other review of systems are negative and/or non-contributory.    Physical  "Examination:  /75 (BP Location: Left arm, Patient Position: Sitting, BP Cuff Size: Large adult)   Pulse 57   Temp 36.5 °C (97.7 °F) (Temporal)   Resp 16   Ht 1.6 m (5' 3\")   Wt 82.6 kg (182 lb 3.2 oz)   SpO2 96%   BMI 32.28 kg/m²      General: ambulated independently; no acute distress; well-nourished; work of breathing was not increased; normal vocal character  HEENT: normocephalic; anicteric sclerae; conjunctivae not injected; nasal mucosa was unremarkable; oropharynx was clear without evidence of thrush; dentition was good.  Neck: supple; no lymphadenopathy or thyromegaly.  Chest: Bilateral inspiratory crepitation   cardiac: regular rhythm; no gallop or murmur.  Abdomen: soft; non-tender; non-distended; no hepatosplenomegaly.  Extremities: Varicose vein veins bilateral   psychiatric: did not appear depressed or anxious.    Pulmonary Function Test Results     12/14/23  Study Result    Narrative & Impression   The FEV1/FVC is normal. The FEV1 is normal. The FVC is normal. The TLC by body plethysmography is normal. The DLCO is normal; however, the diffusing capacity was not corrected for the patient's hemoglobin. The airways resistance is normal. Following administration of bronchodilators, there is no significant response. Conclusion: Normal spirometry. Normal lung volumes by plethysmography. The DLCO is normal.The patient walked 220 meters during the test. Minimum Spo2 during the test was 93%, no supplemental oxygen was required during the test.      Pulmonary function test  Status: Final result     Study Result    Narrative & Impression   The FEV1/FVC is normal. The FEV1 is normal. The FVC is normal. The TLC by body plethysmography is normal. The DLCO is normal; however, the diffusing capacity was not corrected for the patient's hemoglobin. The airways resistance is normal. Following administration of bronchodilators, there is no significant response. Conclusion: Normal spirometry. Normal lung " volumes by plethysmography. The DLCO is normal.The patient walked 220 meters during the test. Minimum Spo2 during the test was 93%, no supplemental oxygen was required during the test.     Scans on Order [id number]        Pulmonary Function Test - Scan on 12/14/2023  9:41 AM                        Pulmonary Function Test - Scan on 1/24/2024  3:12 PM                        Chest Radiograph     XR CHEST 2 VIEWS 04/13/2023    Narrative  * * *Final Report* * *    DATE OF EXAM: Apr 13 2023  1:36PM    FVX   [id number]  -  XR CHEST 2V FRONTAL/LAT  / ACCESSION #  [id number]    PROCEDURE REASON: multiple diagnoses    * * * * Physician Interpretation * * * *    EXAMINATION:  CHEST RADIOGRAPH (2 VIEW FRONTAL & LATERAL)    CLINICAL HISTORY: Paroxysmal atrial fibrillation (HCC) Systolic  dysfunction  MQ:  [id number]_6    EXAM DATE/TIME:  4/13/2023 1:36 PM    COMPARISON:  10/11/2022      RESULT:    Lines, tubes, and devices:  None.    Lungs and pleura:  No consolidation. No lung mass. No pleural effusion.  No pneumothorax.    Cardiomediastinal silhouette:  Normal cardiomediastinal silhouette.    Bones and soft tissues:  Unremarkable.    Impression  IMPRESSION:    No acute radiographic abnormality.                : BLADIMIR  Transcribe Date/Time: Apr 13 2023 10:22P    Dictated by : INO JONES MD    This examination was interpreted and the report reviewed and  electronically signed by:  ION JONES MD on Apr 13 2023 10:22PM  EST      Echocardiogram     No results found for this or any previous visit from the past 365 days.       Chest CT Scan            Co-morbidities Problem List    Assessment and Plan / Recommendations:  Problem List Items Addressed This Visit    None  Visit Diagnoses       ILD (interstitial lung disease) (Multi)    -  Primary    Relevant Orders    Complete Pulmonary Function Test (Spirometry/DLCO/Lung Volumes)    Transthoracic Echo (TTE) Complete    Oximetry Desat/O2 Titration (Exercise Desat)    Pulmonary Stress  Test (6 Min. Walk)    Pulmonary hypertension (Multi)        Relevant Orders    Transthoracic Echo (TTE) Complete    Oximetry Desat/O2 Titration (Exercise Desat)    Pulmonary Stress Test (6 Min. Walk)                  Chest CT Scan        LOWER CHEST: Bilateral subpleural predominant reticular and  ground-glass opacities within the bilateral lung dependent regions..  The visualized distal esophagus is patulous with layering fluid.  Partially visualized cardiomegaly with biatrial enlargement.    Dilated esophagus and thin walled cyst:    February 2024       -CTD- ILD: Connective tissue disease related ILD  CT images reviewed shows subpleural reticulation and thin-walled cysts   possibility of LIP , lymphocytic interstitial pneumonia versus NSIP nonspecific interstitial pneumonia  High-resolution CT in February 2024 shows minimal fibrotic changes  PFT no evidence of obstruction or restriction  Minimal respiratory symptoms, oxygen saturation above 95.  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:  Positive GLADYS anti KU antichromatin, high CK, positive muscle biopsy  Follow with rheumatology   Currently on 5 mg prednisone   Follow-up C-reactive protein ESR CK and aldolase     -Patulous esophagus:  CT chest and abdomen shows wide open esophagus  High risk for reflux and microaspiration  Recommend pantoprazole 40 mg at night   asymptomatic patient is unwilling to take PPI     -Sjogren disease:  Positive anti-SSA, and foreign body sensation eyes  Recommend annual eye examination     -Other medical problem:  A-fib: On Eliquis  Osteoarthritis of the hip    Follow-up in 4 months with PFT and 6-minute walk test  I have independently interviewed and examined the  patient in the office and reviewed available records.  I personally reviewed CT images and lab reports    Professional time of this encounter 45 minutes    Freddy Duncan MD  04/16/2024

## 2024-04-16 ENCOUNTER — OFFICE VISIT (OUTPATIENT)
Dept: PULMONOLOGY | Facility: CLINIC | Age: 74
End: 2024-04-16
Payer: COMMERCIAL

## 2024-04-16 ENCOUNTER — LAB (OUTPATIENT)
Dept: LAB | Facility: LAB | Age: 74
End: 2024-04-16
Payer: COMMERCIAL

## 2024-04-16 VITALS
SYSTOLIC BLOOD PRESSURE: 131 MMHG | BODY MASS INDEX: 32.28 KG/M2 | OXYGEN SATURATION: 96 % | HEIGHT: 63 IN | WEIGHT: 182.2 LBS | HEART RATE: 57 BPM | RESPIRATION RATE: 16 BRPM | DIASTOLIC BLOOD PRESSURE: 75 MMHG | TEMPERATURE: 97.7 F

## 2024-04-16 DIAGNOSIS — J84.9 ILD (INTERSTITIAL LUNG DISEASE) (MULTI): Primary | ICD-10-CM

## 2024-04-16 DIAGNOSIS — Z79.899 LONG-TERM USE OF HIGH-RISK MEDICATION: ICD-10-CM

## 2024-04-16 DIAGNOSIS — M60.9 MYOSITIS, UNSPECIFIED MYOSITIS TYPE, UNSPECIFIED SITE: ICD-10-CM

## 2024-04-16 DIAGNOSIS — I27.20 PULMONARY HYPERTENSION (MULTI): ICD-10-CM

## 2024-04-16 DIAGNOSIS — G72.49 IDIOPATHIC INFLAMMATORY MYOPATHY: ICD-10-CM

## 2024-04-16 DIAGNOSIS — M35.1 MCTD (MIXED CONNECTIVE TISSUE DISEASE) (MULTI): ICD-10-CM

## 2024-04-16 DIAGNOSIS — Z79.52 LONG TERM (CURRENT) USE OF SYSTEMIC STEROIDS: ICD-10-CM

## 2024-04-16 LAB
ALBUMIN SERPL BCP-MCNC: 4 G/DL (ref 3.4–5)
ALP SERPL-CCNC: 64 U/L (ref 33–136)
ALT SERPL W P-5'-P-CCNC: 17 U/L (ref 7–45)
ANION GAP SERPL CALC-SCNC: 17 MMOL/L (ref 10–20)
AST SERPL W P-5'-P-CCNC: 26 U/L (ref 9–39)
BASOPHILS # BLD AUTO: 0.03 X10*3/UL (ref 0–0.1)
BASOPHILS NFR BLD AUTO: 0.5 %
BILIRUB SERPL-MCNC: 0.6 MG/DL (ref 0–1.2)
BUN SERPL-MCNC: 17 MG/DL (ref 6–23)
CALCIUM SERPL-MCNC: 9.9 MG/DL (ref 8.6–10.6)
CHLORIDE SERPL-SCNC: 103 MMOL/L (ref 98–107)
CK SERPL-CCNC: 192 U/L (ref 0–215)
CO2 SERPL-SCNC: 27 MMOL/L (ref 21–32)
CREAT SERPL-MCNC: 0.54 MG/DL (ref 0.5–1.05)
CRP SERPL-MCNC: 0.39 MG/DL
EGFRCR SERPLBLD CKD-EPI 2021: >90 ML/MIN/1.73M*2
EOSINOPHIL # BLD AUTO: 0.04 X10*3/UL (ref 0–0.4)
EOSINOPHIL NFR BLD AUTO: 0.6 %
ERYTHROCYTE [DISTWIDTH] IN BLOOD BY AUTOMATED COUNT: 13.4 % (ref 11.5–14.5)
ERYTHROCYTE [SEDIMENTATION RATE] IN BLOOD BY WESTERGREN METHOD: 12 MM/H (ref 0–30)
GLUCOSE SERPL-MCNC: 87 MG/DL (ref 74–99)
HCT VFR BLD AUTO: 42 % (ref 36–46)
HGB BLD-MCNC: 13.1 G/DL (ref 12–16)
IMM GRANULOCYTES # BLD AUTO: 0.02 X10*3/UL (ref 0–0.5)
IMM GRANULOCYTES NFR BLD AUTO: 0.3 % (ref 0–0.9)
LYMPHOCYTES # BLD AUTO: 0.92 X10*3/UL (ref 0.8–3)
LYMPHOCYTES NFR BLD AUTO: 14 %
MCH RBC QN AUTO: 30.5 PG (ref 26–34)
MCHC RBC AUTO-ENTMCNC: 31.2 G/DL (ref 32–36)
MCV RBC AUTO: 98 FL (ref 80–100)
MONOCYTES # BLD AUTO: 0.52 X10*3/UL (ref 0.05–0.8)
MONOCYTES NFR BLD AUTO: 7.9 %
NEUTROPHILS # BLD AUTO: 5.04 X10*3/UL (ref 1.6–5.5)
NEUTROPHILS NFR BLD AUTO: 76.7 %
NRBC BLD-RTO: 0 /100 WBCS (ref 0–0)
PLATELET # BLD AUTO: 259 X10*3/UL (ref 150–450)
POTASSIUM SERPL-SCNC: 4.5 MMOL/L (ref 3.5–5.3)
PROT SERPL-MCNC: 7 G/DL (ref 6.4–8.2)
RBC # BLD AUTO: 4.29 X10*6/UL (ref 4–5.2)
SODIUM SERPL-SCNC: 142 MMOL/L (ref 136–145)
WBC # BLD AUTO: 6.6 X10*3/UL (ref 4.4–11.3)

## 2024-04-16 PROCEDURE — 80053 COMPREHEN METABOLIC PANEL: CPT

## 2024-04-16 PROCEDURE — 1157F ADVNC CARE PLAN IN RCRD: CPT | Performed by: INTERNAL MEDICINE

## 2024-04-16 PROCEDURE — 86140 C-REACTIVE PROTEIN: CPT

## 2024-04-16 PROCEDURE — 36415 COLL VENOUS BLD VENIPUNCTURE: CPT

## 2024-04-16 PROCEDURE — 99214 OFFICE O/P EST MOD 30 MIN: CPT | Performed by: INTERNAL MEDICINE

## 2024-04-16 PROCEDURE — 85652 RBC SED RATE AUTOMATED: CPT

## 2024-04-16 PROCEDURE — 1159F MED LIST DOCD IN RCRD: CPT | Performed by: INTERNAL MEDICINE

## 2024-04-16 PROCEDURE — 82085 ASSAY OF ALDOLASE: CPT

## 2024-04-16 PROCEDURE — 85025 COMPLETE CBC W/AUTO DIFF WBC: CPT

## 2024-04-16 PROCEDURE — 82550 ASSAY OF CK (CPK): CPT

## 2024-04-16 PROCEDURE — 1036F TOBACCO NON-USER: CPT | Performed by: INTERNAL MEDICINE

## 2024-04-18 LAB — ALDOLASE SERPL-CCNC: 5.2 U/L (ref 1.2–7.6)

## 2024-04-25 ENCOUNTER — APPOINTMENT (OUTPATIENT)
Dept: NEUROLOGY | Facility: CLINIC | Age: 74
End: 2024-04-25
Payer: COMMERCIAL

## 2024-04-29 NOTE — PROGRESS NOTES
Rheumatology Outpatient Follow up Note  Virtual or Telephone Consent    An interactive audio and video telecommunication system which permits real time communications between the patient (at the originating site) and provider (at the distant site) was utilized to provide this telehealth service.   Verbal consent was requested and obtained from Nora Bynum on this date, 04/30/24 for a telehealth visit.     Subjective   Nora Bynum is a 74 y.o. female presenting today for Joint Pain.    History of Presenting Problem:   Rheum hx (Recall from previous notes):  Previously saw Dr. Jacome at [facility] since 2019. Nml CK, RF 16, ESR 47. Then saw Dr. Gomes and Elan (12/2019 GLADYS > 1:1280, negative JUSTIN, nml C3/C4, RF/CCP negative)   Sjogrens, SSA+ , Raynauds, dry mouth since 2019 June 2022, Prednisone 20mg daily 6/25/23 by Dr. Pierson for possible PMR (also hx of elevated CK up to 1411 previously), followed up with Dr. 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 EMG done by Dr. Thakur (Neurology) and recommended for muscle biopsy. She underwent MM bx 5/17/23 for Left deltoid and left rectus femoris. Pt was called by Dr. 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      She followed up with Alphonso Stanton on 5/30/23: On Pred 60mg daily and Azathioprine 150mg daily. Muscle pain improved, steorid taper by 10mg in 4 weeks, then 50mg X3 weeks, then 40mg X2 weeks. Continue AZA 150mg daily.     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 Duncan and pending repeat PFT and recommended switching AZA to Cellcept    Current IS:  - Prednisone 60mg 5/25/23, then tapering every 4 weeks by 10mg, then  1 mg q 2 weeks. Briefly restarted 10 mg due to transition from AZA to Cellcept. Now 4.5 mg  - Myfortic 720 mg BID      Interval history:  She is currently on 4.5 mg prednisone and Myfortic 720 mg BID GI symptoms improved. She reports that her weakness has improved significantly overall with steroids/Myfortic . No new joint swelling. No morning stiffness. No skin rashes/hair loss. No dysphagia. No SOB. She has chronic cough that may be slightly getting worse in the last few weeks.              Past Medical History:   Past Medical History:   Diagnosis Date    Other specified health status     No pertinent past medical history       Allergies: No Known Allergies    Medications:   Current Outpatient Medications:     bisoprolol (Zebeta) 5 mg tablet, Take 1.5 tablets (7.5 mg) by mouth once daily at bedtime., Disp: , Rfl:     cholecalciferol (Vitamin D3) 50 mcg (2,000 unit) capsule, Take 1 capsule (50 mcg) by mouth once daily., Disp: , Rfl:     cyanocobalamin (Vitamin B-12) 1,000 mcg tablet, Take 1 tablet (1,000 mcg) by mouth once daily., Disp: , Rfl:     dofetilide (Tikosyn) 250 mcg capsule, Take 1 capsule (250 mcg) by mouth twice a day., Disp: , Rfl:     Eliquis 5 mg tablet, Take 1 tablet (5 mg) by mouth 2 times a day., Disp: , Rfl:     fish oil concentrate (Fish OiL) 120-180 mg capsule, Take by mouth., Disp: , Rfl:     magnesium 30 mg tablet, Take 1 tablet (30 mg) by mouth once daily., Disp: , Rfl:     melatonin 5 mg tablet, Take 1 tablet (5 mg) by mouth once daily at bedtime., Disp: , Rfl:     mycophenolate (Myfortic) 360 mg EC tablet, Take 2 tablets (720 mg) by mouth 2 times a day., Disp: 120 tablet, Rfl: 11    predniSONE (Deltasone) 1 mg tablet, Take 4 tablets (4 mg) by mouth once daily. (Patient taking differently: Take 5 tablets (5 mg) by mouth once daily.), Disp: 120 tablet, Rfl: 2    vit B complex 100 combo no.2 (B-100 COMPLEX ORAL), Take 1 capsule by mouth once daily., Disp: , Rfl:     Review of Systems:   All  "10 review of systems have been reviewed and are negative for complaint except as noted in the HPI    Objective   Physical Examination:  There were no vitals filed for this visit.    Growth %ile SmartLinks can only be used for patients less than 20 years old.  Ht Readings from Last 1 Encounters:   24 1.6 m (5' 3\")     Wt Readings from Last 1 Encounters:   24 82.6 kg (182 lb 3.2 oz)       Exam limited, virtual visit        Laboratory Testin2023: GLADYS 1:2560 speckled, anti chromatin +, anti Ku + other myositis panel negative    2023 ESR 39, CRP <0.4 mg/dL  2023 CK 1014 --> 435 --> 349 --> 141 --> 78 ()   Aldolase 22.3 --> 8.7 --> 5.0 ()  10/2023 CPK 97, aldolase 5.7, CBC shows elevated hematocrit and MCV    2023 Negative TB/hepatitis tests    Muscle biopsy 2023:   FINAL DIAGNOSIS  A, B.  LEFT DELTOID AND RECTUS FEMORIS MUSCLES, BIOPSIES:  --INFLAMMATORY MYOPATHY, SEE NOTE.    Note: Histopathologic features are qualitatively similar within the biopsy  specimens, however, they are quantitatively more severe within the rectus  femoris muscle.  The biopsies demonstrate both endomysial and  perimysial/vascular inflammation composed predominantly of CD3 positive T  cells, accompanied by muscle fiber necrosis with regeneration (highlighted with  antibodies to P62, CD68, and CD56).  Trichrome staining demonstrates no ragged  red fibers or rimmed vacuoles, however, it highlights perimysial fibrosis in  the rectus femoris muscle.  Myosin heavy chain immunostains show no fiber type  grouping.  Cytochrome oxidase staining is within normal limits, and [place] red  stain for amyloid is negative.       CT chest 2023:  1.  Mild subpleural reticulation and minimal subpleural ground glass   opacities most significant in the lower lobes bilaterally.  Suggest a   degree of fibrosis but unchanged from 2021.     2.  0.3 cm nodule in the left lower lobe, new from prior study.  May be   infectious or " inflammatory recommend follow-up in 6-12 months.     MRI pancreas 7/2023:  Pancreas: Pancreas is normal in precontrast T1 signal intensity with no solid masses or main pancreatic duct dilatation.     Multiple unilocular cystic pancreatic lesions communicate with the main   pancreatic duct are noted for example a 1.3 cm cystic structure in the   pancreatic body (3:37). 3 mm cystic lesion at the junction of the   pancreatic body and tail (3:36). No worrisome imaging features.     Biliary: No intrahepatic or extrahepatic biliary ductal dilation.  No   choledocholithiasis or stricture.     Gallbladder: No cholelithiasis or gallbladder wall thickening.     Liver: 1 cm right hepatic lobe cyst. No enhancing mass.     No thrombus in the portal venous system (splenic vein, main portal vein,   left and right anterior and right posterior portal vein branches).     Spleen: Wedge-shaped area of T2 hypointense signal in the spleen may be   related to prior trauma.     Kidneys: Kidneys enhance symmetrically without hydroureteronephrosis or   enhancing renal masses. 8 mm thinly septated cyst interpolar right kidney.     Adrenal glands: Adrenal glands are normal.     Lymph nodes: No lymphadenopathy by size criteria.     Mesentery: No ascites.     Osseous structures: No aggressive osseous lesions.     PFT 12/2023:   The FEV1/FVC is normal. The FEV1 is normal. The FVC is normal. The TLC by body plethysmography is normal. The DLCO is normal; however, the diffusing capacity was not corrected for the patient's hemoglobin. The airways resistance is normal. Following administration of bronchodilators, there is no significant response. Conclusion: Normal spirometry. Normal lung volumes by plethysmography. The DLCO is normal.The patient walked 220 meters during the test. Minimum Spo2 during the test was 93%, no supplemental oxygen was required during the test.    HRCT 2/2024:   IMPRESSION:  Mild pulmonary fibrotic changes without  honeycombing or  bronchiectasis.  Small hiatal hernia with patulous distal esophagus.  Pancreatic cystic lesion in the body may be further assessed with pancreas MRI.        Assessment/Plan   Nora Bynum is a 74 y.o. female with PMHx of HTN, Afib, HLP, vitamin D deficiency, increased BMI, MCTD, knee OA, and lung fibrosis,  who is presenting today as a follow up for inflammatory myositis:       ##Inflammatory myopathy/ILD:  -Clinically: myopathy, inflammatory arthritis, Raynaud's, sicca and ILD. Extended myositis panel +Ku, MM biopsy. Elevated CK/aldolase/ESR previously  -Reviewed Dr. Duncan's notes and recommend switching AZA to CellCept. Patient agreed.   -Switched AZA to Cellcept then to Myfortic 360 mg 2 pills BID due to side effects with cellcept (can go up to 1080 mg 3 pills BID)  -Tapering pred by 0.5 mg q 1-2 months. She is now on 4.5 mg prednisone.  She would like to check Ck before each prednisone taper. We discussed that we go by symptoms but I ordered standing CK orders as per her request  -Reviewed labs from 4/2024: CMP/CBC/ESR/CRP/CK/aldolase normal.    -Updated PFT  12/2023 normal  -HRCT 2024: Mild pulmonary fibrotic changes without honeycombing or bronchiectasis. She will follow up with Dr Duncan  -Referred to physical therapy for muscle strengthening   -Saw general surgery for pancreas and recommended monitoring   -Has had prevnar, shingles and covid vaccinations      ##Medical management of high risk medication:  -Drug name: Myfortic  -GI side effects and CellCept switched to Myfortic  -Labs reviewed  -Most recent TB/hepatitis tests negative in 4/2023  -Educated the patient about risks and benefits of such therapy, and that the benefits of this therapy outweigh the risks and thus we will continue.    ##Steroids/chronic use:  -Educated the patient about potential toxicity from chronic steroid use (most importantly increased infections, diabetes, hypertension, osteoporosis, increased skin  breakdown, psychosis..etc) and the need to taper it down as much as tolerated  -Advised to optimize bone health by increasing calcium and vitamin D dietary and supplementary intake  -DEXA scan: followed by PCP      The assessment and plan, risk and benefits were discussed with the patient. All of the patients questions were answered and patient agrees to the plan.      Janis Greco MD  Clinical   Department of Rheumatology   [facility]

## 2024-04-30 ENCOUNTER — TELEMEDICINE (OUTPATIENT)
Dept: RHEUMATOLOGY | Facility: CLINIC | Age: 74
End: 2024-04-30
Payer: COMMERCIAL

## 2024-04-30 DIAGNOSIS — Z79.52 LONG TERM (CURRENT) USE OF SYSTEMIC STEROIDS: ICD-10-CM

## 2024-04-30 DIAGNOSIS — Z79.899 LONG-TERM USE OF HIGH-RISK MEDICATION: ICD-10-CM

## 2024-04-30 DIAGNOSIS — M60.9 MYOSITIS, UNSPECIFIED MYOSITIS TYPE, UNSPECIFIED SITE: ICD-10-CM

## 2024-04-30 DIAGNOSIS — M35.1 MCTD (MIXED CONNECTIVE TISSUE DISEASE) (MULTI): ICD-10-CM

## 2024-04-30 DIAGNOSIS — G72.49 IDIOPATHIC INFLAMMATORY MYOPATHY: Primary | ICD-10-CM

## 2024-04-30 PROCEDURE — 99215 OFFICE O/P EST HI 40 MIN: CPT | Performed by: STUDENT IN AN ORGANIZED HEALTH CARE EDUCATION/TRAINING PROGRAM

## 2024-04-30 PROCEDURE — 1036F TOBACCO NON-USER: CPT | Performed by: STUDENT IN AN ORGANIZED HEALTH CARE EDUCATION/TRAINING PROGRAM

## 2024-04-30 PROCEDURE — 1159F MED LIST DOCD IN RCRD: CPT | Performed by: STUDENT IN AN ORGANIZED HEALTH CARE EDUCATION/TRAINING PROGRAM

## 2024-04-30 PROCEDURE — 1157F ADVNC CARE PLAN IN RCRD: CPT | Performed by: STUDENT IN AN ORGANIZED HEALTH CARE EDUCATION/TRAINING PROGRAM

## 2024-04-30 RX ORDER — MYCOPHENOLIC ACID 360 MG/1
720 TABLET, DELAYED RELEASE ORAL 2 TIMES DAILY
Qty: 120 TABLET | Refills: 2 | Status: SHIPPED | OUTPATIENT
Start: 2024-04-30 | End: 2025-04-30

## 2024-05-06 NOTE — OP NOTE
SURGEON:  Crow Thomas MD    PREOPERATIVE DIAGNOSIS:    Myositis.    POSTOPERATIVE DIAGNOSIS:    Myositis.    PROCEDURE:    Left deltoid and left rectus femoris muscle biopsies.    ANESTHESIA:    MAC.    INDICATIONS:    The patient is a 73-year-old female with myositis.  Her neurologist  and rheumatologist requested muscle biopsies.     DESCRIPTION OF PROCEDURE:    Following informed consent, patient was taken to the operating room,  placed in supine position.  Huddle was performed.  She was given a  MAC anesthetic.  Sequential compression devices were in place and  functioning.  Her left shoulder and left thigh were prepped and  draped in sterile fashion.  A time-out was performed.  Following  infiltration of local anesthetic, a longitudinal skin incision was  made overlying the left deltoid muscle.  Incision extended through  subcutaneous tissue and fascia.  A portion of muscle fibers was   and then clamped with a 12 mm muscle biopsy clamp.  The  muscle fibers divided between the muscle clamp and a second clamp  proximal and distal and divided between clamps.  Muscle fibers were  then ligated with 3-0 Vicryl ties.  Following infiltration of further  local anesthetic, a longitudinal skin incision was made over the  anterior thigh overlying the rectus femoris muscle.  The incision was  extended through subcutaneous tissue and fascia.  Muscle fibers of  the rectus femoris were excised and placed into the muscle clamp in a  similar fashion.  Both specimens were sent to pathology fresh.  Wounds were closed in layers with interrupted 3-0 Vicryl subcutaneous  sutures and a running 4-0 Vicryl subcuticular skin stitch.  0.5%  plain Marcaine was infiltrated.  Dressings were placed.  The patient  was taken to recovery room in satisfactory condition, having  tolerated procedure well.  Counts were correct.       Crow Thomas MD    DD:  05/17/2023 14:10:26 EST  DT:  05/18/2023 04:51:43 EST  DICTATION  NUMBER:  [id number]  INTERNAL JOB NUMBER:  [id number]      cc:             Hamlet Powers MD          Electronic Signatures:  Crow Thomas (MD) (Signed on 18-May-2023 07:27)   Authored  Unsigned, Draft (SYS GENERATED) (Entered on 18-May-2023 04:51)   Entered    Last Updated: 18-May-2023 07:27 by Crow Thomas)

## 2024-05-17 ENCOUNTER — LAB (OUTPATIENT)
Dept: LAB | Facility: LAB | Age: 74
End: 2024-05-17
Payer: COMMERCIAL

## 2024-05-17 DIAGNOSIS — G72.49 IDIOPATHIC INFLAMMATORY MYOPATHY: ICD-10-CM

## 2024-05-17 DIAGNOSIS — Z79.52 LONG TERM (CURRENT) USE OF SYSTEMIC STEROIDS: ICD-10-CM

## 2024-05-17 DIAGNOSIS — Z79.899 LONG-TERM USE OF HIGH-RISK MEDICATION: ICD-10-CM

## 2024-05-17 DIAGNOSIS — M60.9 MYOSITIS, UNSPECIFIED MYOSITIS TYPE, UNSPECIFIED SITE: ICD-10-CM

## 2024-05-17 LAB — CK SERPL-CCNC: 175 U/L (ref 0–215)

## 2024-05-17 PROCEDURE — 82085 ASSAY OF ALDOLASE: CPT

## 2024-05-17 PROCEDURE — 36415 COLL VENOUS BLD VENIPUNCTURE: CPT

## 2024-05-17 PROCEDURE — 82550 ASSAY OF CK (CPK): CPT

## 2024-05-20 LAB — ALDOLASE SERPL-CCNC: 5.9 U/L (ref 1.2–7.6)

## 2024-06-05 ENCOUNTER — APPOINTMENT (OUTPATIENT)
Dept: INTEGRATIVE MEDICINE | Facility: CLINIC | Age: 74
End: 2024-06-05
Payer: COMMERCIAL

## 2024-06-24 ENCOUNTER — APPOINTMENT (OUTPATIENT)
Dept: INTEGRATIVE MEDICINE | Facility: CLINIC | Age: 74
End: 2024-06-24
Payer: COMMERCIAL

## 2024-07-01 ENCOUNTER — APPOINTMENT (OUTPATIENT)
Dept: INTEGRATIVE MEDICINE | Facility: CLINIC | Age: 74
End: 2024-07-01
Payer: COMMERCIAL

## 2024-07-30 ENCOUNTER — LAB (OUTPATIENT)
Dept: LAB | Facility: LAB | Age: 74
End: 2024-07-30
Payer: MEDICARE

## 2024-07-30 DIAGNOSIS — Z79.52 LONG TERM (CURRENT) USE OF SYSTEMIC STEROIDS: ICD-10-CM

## 2024-07-30 DIAGNOSIS — Z79.899 LONG-TERM USE OF HIGH-RISK MEDICATION: ICD-10-CM

## 2024-07-30 DIAGNOSIS — M60.9 MYOSITIS, UNSPECIFIED MYOSITIS TYPE, UNSPECIFIED SITE: ICD-10-CM

## 2024-07-30 DIAGNOSIS — G72.49 IDIOPATHIC INFLAMMATORY MYOPATHY: ICD-10-CM

## 2024-07-30 LAB
ALBUMIN SERPL BCP-MCNC: 3.9 G/DL (ref 3.4–5)
ALP SERPL-CCNC: 48 U/L (ref 33–136)
ALT SERPL W P-5'-P-CCNC: 18 U/L (ref 7–45)
ANION GAP SERPL CALC-SCNC: 15 MMOL/L (ref 10–20)
AST SERPL W P-5'-P-CCNC: 42 U/L (ref 9–39)
BASOPHILS # BLD AUTO: 0.01 X10*3/UL (ref 0–0.1)
BASOPHILS NFR BLD AUTO: 0.2 %
BILIRUB SERPL-MCNC: 0.5 MG/DL (ref 0–1.2)
BUN SERPL-MCNC: 12 MG/DL (ref 6–23)
CALCIUM SERPL-MCNC: 9.2 MG/DL (ref 8.6–10.3)
CHLORIDE SERPL-SCNC: 103 MMOL/L (ref 98–107)
CO2 SERPL-SCNC: 25 MMOL/L (ref 21–32)
CREAT SERPL-MCNC: 0.55 MG/DL (ref 0.5–1.05)
CRP SERPL-MCNC: 1.94 MG/DL
EGFRCR SERPLBLD CKD-EPI 2021: >90 ML/MIN/1.73M*2
EOSINOPHIL # BLD AUTO: 0 X10*3/UL (ref 0–0.4)
EOSINOPHIL NFR BLD AUTO: 0 %
ERYTHROCYTE [DISTWIDTH] IN BLOOD BY AUTOMATED COUNT: 13.5 % (ref 11.5–14.5)
ERYTHROCYTE [SEDIMENTATION RATE] IN BLOOD BY WESTERGREN METHOD: 27 MM/H (ref 0–30)
GLUCOSE SERPL-MCNC: 114 MG/DL (ref 74–99)
HCT VFR BLD AUTO: 42.6 % (ref 36–46)
HGB BLD-MCNC: 13.8 G/DL (ref 12–16)
IMM GRANULOCYTES # BLD AUTO: 0.02 X10*3/UL (ref 0–0.5)
IMM GRANULOCYTES NFR BLD AUTO: 0.3 % (ref 0–0.9)
LYMPHOCYTES # BLD AUTO: 0.49 X10*3/UL (ref 0.8–3)
LYMPHOCYTES NFR BLD AUTO: 8 %
MCH RBC QN AUTO: 30.5 PG (ref 26–34)
MCHC RBC AUTO-ENTMCNC: 32.4 G/DL (ref 32–36)
MCV RBC AUTO: 94 FL (ref 80–100)
MONOCYTES # BLD AUTO: 0.19 X10*3/UL (ref 0.05–0.8)
MONOCYTES NFR BLD AUTO: 3.1 %
NEUTROPHILS # BLD AUTO: 5.41 X10*3/UL (ref 1.6–5.5)
NEUTROPHILS NFR BLD AUTO: 88.4 %
NRBC BLD-RTO: 0 /100 WBCS (ref 0–0)
PLATELET # BLD AUTO: 325 X10*3/UL (ref 150–450)
POTASSIUM SERPL-SCNC: 5.3 MMOL/L (ref 3.5–5.3)
PROT SERPL-MCNC: 7 G/DL (ref 6.4–8.2)
RBC # BLD AUTO: 4.53 X10*6/UL (ref 4–5.2)
SODIUM SERPL-SCNC: 138 MMOL/L (ref 136–145)
WBC # BLD AUTO: 6.1 X10*3/UL (ref 4.4–11.3)

## 2024-07-30 PROCEDURE — 85652 RBC SED RATE AUTOMATED: CPT

## 2024-07-30 PROCEDURE — 80053 COMPREHEN METABOLIC PANEL: CPT

## 2024-07-30 PROCEDURE — 85025 COMPLETE CBC W/AUTO DIFF WBC: CPT

## 2024-07-30 PROCEDURE — 86140 C-REACTIVE PROTEIN: CPT

## 2024-07-30 PROCEDURE — 36415 COLL VENOUS BLD VENIPUNCTURE: CPT

## 2024-08-01 ENCOUNTER — HOSPITAL ENCOUNTER (OUTPATIENT)
Dept: RESPIRATORY THERAPY | Facility: HOSPITAL | Age: 74
Discharge: HOME | End: 2024-08-01
Payer: MEDICARE

## 2024-08-01 ENCOUNTER — DOCUMENTATION (OUTPATIENT)
Dept: RHEUMATOLOGY | Facility: CLINIC | Age: 74
End: 2024-08-01

## 2024-08-01 ENCOUNTER — LAB (OUTPATIENT)
Dept: LAB | Facility: LAB | Age: 74
End: 2024-08-01
Payer: MEDICARE

## 2024-08-01 DIAGNOSIS — G72.49 IDIOPATHIC INFLAMMATORY MYOPATHY: ICD-10-CM

## 2024-08-01 DIAGNOSIS — I27.20 PULMONARY HYPERTENSION (MULTI): ICD-10-CM

## 2024-08-01 DIAGNOSIS — J84.9 ILD (INTERSTITIAL LUNG DISEASE) (MULTI): ICD-10-CM

## 2024-08-01 DIAGNOSIS — Z79.52 LONG TERM (CURRENT) USE OF SYSTEMIC STEROIDS: ICD-10-CM

## 2024-08-01 DIAGNOSIS — Z79.899 LONG-TERM USE OF HIGH-RISK MEDICATION: ICD-10-CM

## 2024-08-01 DIAGNOSIS — M60.9 MYOSITIS, UNSPECIFIED MYOSITIS TYPE, UNSPECIFIED SITE: ICD-10-CM

## 2024-08-01 LAB
CK SERPL-CCNC: 224 U/L (ref 0–215)
MGC ASCENT PFT - FEV1 - PRE: 2.09
MGC ASCENT PFT - FEV1 - PRE: 2.09
MGC ASCENT PFT - FEV1 - PREDICTED: 1.95
MGC ASCENT PFT - FEV1 - PREDICTED: 1.95
MGC ASCENT PFT - FVC - PRE: 2.43
MGC ASCENT PFT - FVC - PRE: 2.43
MGC ASCENT PFT - FVC - PREDICTED: 2.53
MGC ASCENT PFT - FVC - PREDICTED: 2.53

## 2024-08-01 PROCEDURE — 82550 ASSAY OF CK (CPK): CPT

## 2024-08-01 PROCEDURE — 94618 PULMONARY STRESS TESTING: CPT

## 2024-08-01 PROCEDURE — 94726 PLETHYSMOGRAPHY LUNG VOLUMES: CPT

## 2024-08-01 PROCEDURE — 82085 ASSAY OF ALDOLASE: CPT

## 2024-08-01 PROCEDURE — 36415 COLL VENOUS BLD VENIPUNCTURE: CPT

## 2024-08-01 NOTE — PROGRESS NOTES
I spoke with patient regarding her most recent labs. Her CK is slightly rising to 224 and CRP 1.94. ESR normal and pending aldolase. Her AST is 42. She is feeling more SOB and has myalgias in her shoulders and thighs. She thinks that her symptoms were slightly progressing since she was off prednisone. She self increased prednisone to 10 mg daily. She is slightly feeling better. She is scheduled with Chuckie Jacome on 8/6. She is advised to stay on 10 mg prednisone until seen by her.

## 2024-08-03 LAB — ALDOLASE SERPL-CCNC: 6.9 U/L (ref 1.2–7.6)

## 2024-08-06 ENCOUNTER — APPOINTMENT (OUTPATIENT)
Dept: RHEUMATOLOGY | Facility: CLINIC | Age: 74
End: 2024-08-06
Payer: COMMERCIAL

## 2024-08-06 DIAGNOSIS — M33.20 POLYMYOSITIS (MULTI): Primary | ICD-10-CM

## 2024-08-06 PROCEDURE — 99214 OFFICE O/P EST MOD 30 MIN: CPT | Performed by: INTERNAL MEDICINE

## 2024-08-06 PROCEDURE — 1157F ADVNC CARE PLAN IN RCRD: CPT | Performed by: INTERNAL MEDICINE

## 2024-08-06 RX ORDER — AZATHIOPRINE 50 MG/1
50 TABLET ORAL 2 TIMES DAILY
Qty: 60 TABLET | Refills: 3 | Status: SHIPPED | OUTPATIENT
Start: 2024-08-06

## 2024-08-06 NOTE — PROGRESS NOTES
"Virtual or Telephone Consent    An interactive audio and video telecommunication system which permits real time communications between the patient (at the originating site) and provider (at the distant site) was utilized to provide this telehealth service.   Verbal consent was requested and obtained from Nora Bynum on this date, 08/06/24 for a telehealth visit.            Recall  Patient last seen by me in 2020 at  for MCTD with myositis, sicca symptoms, inflammatory arthritis and MGUS.. I reviewed her old chart     70 year old female with PMH significant for Hashimoto's (+microsomal Ab 2017),  hypertension and hyperlipidemia who presents for follow up of muscle weakness  and elevated CPK     Last seen 3/12/20    Was in her usual state of health - feeling very well, very active at baseline.     stressfully event in July 2019. Bedbugs in her building. She was moving and  lifting a lot of furniture. No other preceding injury, infection or new  medication.  One day in late July she woke up with stiff neck, thought she slept on it  strangely. Within a matter of days her hands \"blew up\". \"Fingers looked like  sausages with water balloons on the end\". Knees looked like water balloons.  Terrible pain everywhere - shoulders, couldn't move her neck. She was terrible  pain.     [place] and felt she had a ?bull's eye rash, no clear  tick bite. Lyme has been negative x2.  8/2019: normal CBCd and CMP, positive GLADYS by EIA, normal CK, RF 16, ESR 47  11/2019: saw rheumatologist Alena Gomes, was told she had RA. She did not  go back  12/16/19: Saw Dr. Ansari in rheumatology- GLADYS > 1:1280, speckled, negative JUSTIN,  dsDNA, ACPA, RF, normal C3, C4, ESR and CRP.     Seen by ortho, felt to have trace effusions both knees but did not feel  aspiration would be helpful. Offered steroid injection but patient declined.  Dr. Fitzpatrick did feel that she had chondrocalcinosis     Has been following with " functional medicine. Felt she may have an unresolved  infection. patient has since had a tooth pulled     Seen by Dr. Jackson on 2/3 who felt she had triggering in fingers on both  hands. X-rays done in November revealed degenerative changes in right AC joint,  bilateral knee degenerative changes L>R with chondrocalcinosis and mild  degenerative changes both wrists and hands with chondrocalcinosis both wrists.  Also mild DJD lumbar spine       DXA 6/12/19: lowest T-score -1.5. [id number] 58.8     Saw someone at  re: Lyme - positive Igenex. Traditional lyme test negative.  doxycylcine x 2 weeks reportedly improved swelling in her fingers and knees     Has been following with ortho regarding her hand pain and numbness  Also likely adhesive capsulitis R shoulder - referred to OT  Right shoulder US showed rotator cuff tendinosis with small supraspinous tendon  tear, and biceps tenosynovitis and subacromial subdeltoid bursitis.  Finger US: focal tendinosis of 3rd and 4th flexor tendons with hyperemia on the  right. Thickening of the 3rd and 4th digit A1 pulleys on the right. Same on the  left but involving 2nd and 3rd digits.  Ultrasounds of feet and ankles revealed mild synovitis L 5th MTP with minimal  hyperemia, felt to be degenerative in nature.    CXR - partial atelectasis along medial bases. Prominence of lung markings  bilaterally. Low lung volume. No effusion. Emphysematous changes suspected in  upper zones.     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  Normal CBC diff, CMP and urinalysis  GLADYS >1:1280, negative JUSTIN, Th/To, PM-Scl and RNA Brandyn III  Component Latest Ref Rng AND Units 12/19/2019 2/5/2020 6/25/2020 7/6/2020  CRP <0.9 mg/dL 0.3 0.3 0.1  WSR 0 - 20 mm/hr 17 31 (H) 8  CK 42 - 196 U/L 421 (H) 1,411 (H) 1,232 (H)  Aldolase 1.5 - 8.1 U/L 12.0 (H) 17.6 (H) 22.4 (H)     We spoke in June and she had been started on prednisone 20 mg on 6/25/2020 by Dr. Pierson at  for possible  PMR  Has been seeing Dr. Jac[facility] at [facility], last on 7/27, down to prednisone 10 mg  with some improvement in her symptoms  Myositis panel ordered - resulted but I cant see the results  Dr. Jacome discussed malignancy screening and patient declined mammogram and  colonoscopy. patient has not had mammogram since the 1980s and has never had a  colonoscopy. She ordered CT neck, abdomen, pelvis and DXA       She continue prednisone 20 mg until 7/7, saw her CPK had come down so she  decreased her prednisone to 10 mg.  7/27/20: CRP < 0.5, normal BMP. ALT 59, AST 92, T bili 0.7, albumin 3.3, normal  CBC , CPK 1,524  Negative dsDNA, SSA 1.2, remainder JUSTIN panel negative. SPEP +polyclonal  gammopathy, ESR 31. Repeat anti-SSA antibody was elevated, and anti-DNA antibody   8/12/23  CT neck W report - no adenopathy  Dr. Jacome increased her prednisone again to 7/28  She does not like the weight gain and currently on 15 mg daily for the past 4  days  Current IS:  mg/day and prednisone 7.5 mg daily  -Reviewed Dr. Duncan's notes and recommend switching AZA to CellCept. Patient agreed.   -Switched AZA to Cellcept. Current dose is 1000 mg BID. We will switch to Myfortic 360 mg 2 pills BID due to side effects with cellcept (can go up to 1080 mg BID)  -Updated labs 12/2023 normal and she started tapering pred by 1 mg q 4 weeks. She is now on 7.5 mg prednisone.    -Reviewed labs from 12/2023: CMP/CBC normal.  CRP 1.79. ESR:12, CK 95, Aldolase: 3.7.Repeat ESR/CRP/CK/aldolase in 1/2024 normal.     Ku ANTIBODY Positive. 4/2023     Chief Complaint: Follow up of inflammatory myopathy and ILD.     History of Present Illness: At today's visit, the patient reports decreasing prednisone to 2.5 mg/day.   Her CK was elevated to 224 and CRP was elevated to 1.94 mg/dl on 8/1/24 She has increased her prednisone to 20 mg/day.     She has no rash or difficulty swallowing but notices teeth cluttering. Was having difficulty raising her arms over  head.   No difficulty breathing.             Review of Systems  Constitutional: Has fatigue  Head: Denies headaches or hair loss  Eyes: Has dry eyes  ENT: Has dry mouth  Cardiovascular: Denies chest pain, palpitations  Respiratory:Has chronic cough  Gastrointestinal: Denies nausea, vomiting, heartburn, abdominal pain , diarrhea or blood in the stool  Integumentary: Denies photosensitivity, rash or lesions, or psoriasis  Neurological: Has weakness and numbness  MSK: Has joint pain and morning stiffness            Active Problems  Problems    · Abnormal TSH (790.6) (R79.89)   · Acute bronchitis (466.0) (J20.9)   · Breast cancer screening (V76.10) (Z12.39)   · Cough (786.2) (R05.9)   · Edema (782.3) (R60.9)   · Encounter for immunization (V03.89) (Z23)   · Herpes zoster without complication (053.9) (B02.9)   · Initial Medicare annual wellness visit (V70.0) (Z00.00)   · Localized edema (782.3) (R60.0)   · Mild vitamin D deficiency (268.9) (E55.9)   · Myalgia, unspecified site (729.1) (M79.10)   · Myopathy (359.9) (G72.9)   · Osteoarthritis of hip (715.95) (M16.9)   · Pain of right hip joint (719.45) (M25.551)   · Polyarthritis (716.50) (M13.0)   · Positive Lyme disease serology (795.79) (R76.8)   · Stiffness of joints of both hands (719.58) (M25.641,M25.642)     Past Medical History  Problems    · No pertinent past medical history (V49.89) (Z78.9)     Surgical History  Problems    · History of Cyst excision     Family History  Mother    · Family history of    · Family history of malignant neoplasm of kidney (V16.51) (Z80.51)  Father    · Family history of    · Family history of chronic kidney disease (V18.69) (Z84.1)   · Family history of malignant neoplasm of kidney (V16.51) (Z80.51)     Social History  Problems    ·  (V61.03) (Z63.5)   · daughter nearby   · Has 1 child   · Never a smoker   · No alcohol use   · Retired from employment   · previously varioous jobs, 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      cholecalciferol (Vitamin D3) 50 mcg (2,000 unit) capsule Take 1 capsule (50 mcg) by mouth once daily.      cyanocobalamin (Vitamin B-12) 1,000 mcg tablet Take 1 tablet (1,000 mcg) by mouth once daily.      dofetilide (Tikosyn) 250 mcg capsule Take 1 capsule (250 mcg) by mouth twice a day.      Eliquis 5 mg tablet Take 1 tablet (5 mg) by mouth 2 times a day.      fish oil concentrate (Fish OiL) 120-180 mg capsule Take by mouth.      magnesium 30 mg tablet Take 1 tablet (30 mg) by mouth once daily.      melatonin 5 mg tablet Take 1 tablet (5 mg) by mouth once daily at bedtime.      mycophenolate (Myfortic) 360 mg EC tablet Take 2 tablets (720 mg) by mouth 2 times a day. 120 tablet 2    mycophenolate (Myfortic) 360 mg EC tablet Take 2 tablets (720 mg) by mouth 2 times a day. 120 tablet 2    predniSONE (Deltasone) 1 mg tablet Take 4 tablets (4 mg) by mouth once daily. (Patient taking differently: Take 5 tablets (5 mg) by mouth once daily.) 120 tablet 2    vit B complex 100 combo no.2 (B-100 COMPLEX ORAL) Take 1 capsule by mouth once daily.       No current facility-administered medications for this visit.                 Physical Exam    Alert and oriented.   No rash  Full range of motion of shoulders    Component      Latest Ref Rng 1/2/2024 1/5/2024   WBC      4.4 - 11.3 x10*3/uL  8.1    nRBC      0.0 - 0.0 /100 WBCs  0.0    RBC      4.00 - 5.20 x10*6/uL  4.29    HEMOGLOBIN      12.0 - 16.0 g/dL  14.0    HEMATOCRIT      36.0 - 46.0 %  42.8    MCV      80 - 100 fL  100    MCH      26.0 - 34.0 pg  32.6    MCHC      32.0 - 36.0 g/dL  32.7    RED CELL DISTRIBUTION WIDTH      11.5 - 14.5 %  13.6    Platelets      150 - 450 x10*3/uL   252    Neutrophils %      40.0 - 80.0 %  75.6    Immature Granulocytes %, Automated      0.0 - 0.9 %  0.4    Lymphocytes %      13.0 - 44.0 %  15.1    Monocytes %      2.0 - 10.0 %  8.1    Eosinophils %      0.0 - 6.0 %  0.6    Basophils %      0.0 - 2.0 %  0.2    Neutrophils Absolute      1.60 - 5.50 x10*3/uL  6.14 (H)    Immature Granulocytes Absolute, Automated      0.00 - 0.50 x10*3/uL  0.03    Lymphocytes Absolute      0.80 - 3.00 x10*3/uL  1.23    Monocytes Absolute      0.05 - 0.80 x10*3/uL  0.66    Eosinophils Absolute      0.00 - 0.40 x10*3/uL  0.05    Basophils Absolute      0.00 - 0.10 x10*3/uL  0.02    GLUCOSE      74 - 99 mg/dL  89    SODIUM      136 - 145 mmol/L  140    POTASSIUM      3.5 - 5.3 mmol/L  4.4    CHLORIDE      98 - 107 mmol/L  102    Bicarbonate      21 - 32 mmol/L  31    Anion Gap      10 - 20 mmol/L  11    Blood Urea Nitrogen      6 - 23 mg/dL  13    Creatinine      0.50 - 1.05 mg/dL  0.59    EGFR      >60 mL/min/1.73m*2  >90    Calcium      8.6 - 10.3 mg/dL  9.4    Albumin      3.4 - 5.0 g/dL  4.1    Alkaline Phosphatase      33 - 136 U/L  51    Total Protein      6.4 - 8.2 g/dL  6.8    AST      9 - 39 U/L  25    Bilirubin Total      0.0 - 1.2 mg/dL  0.4    ALT      7 - 45 U/L  14    C-Reactive Protein      <1.00 mg/dL 0.24     Creatine Kinase      0 - 215 U/L 137     Vitamin D, 25-Hydroxy, Total      30 - 100 ng/mL  51         FINAL DIAGNOSIS  A, B.  LEFT DELTOID AND RECTUS FEMORIS MUSCLES, BIOPSIES:  --INFLAMMATORY MYOPATHY, SEE NOTE.    Note: Histopathologic features are qualitatively similar within the biopsy  specimens, however, they are quantitatively more severe within the rectus  femoris muscle.  The biopsies demonstrate both endomysial and  perimysial/vascular inflammation composed predominantly of CD3 positive T  cells, accompanied by muscle fiber necrosis with regeneration (highlighted with  antibodies to P62, CD68, and CD56).  Trichrome staining demonstrates no ragged  red  fibers or rimmed vacuoles, however, it highlights perimysial fibrosis in  the rectus femoris muscle.  Myosin heavy chain immunostains show no fiber type  grouping.  Cytochrome oxidase staining is within normal limits, and [place] red  stain for amyloid is negative.        6/2023  RESULTS:     Lumbar spine (L1-L4): 0.886 g/cm2, T-score -2.5, Z-score -0.8     Right Femoral Neck: 0.765 g/cm2, T-score -2.0, Z-score -0.1   Right Total Hip: 0.656 g/cm2, T-score -2.8, Z-score -1.2     Left Femoral Neck: 0.659 g/cm2, T-score -2.7, Z-score -0.9   Left Total Hip: 0.729 g/cm2, T-score -2.2, Z-score -0.6       Assessment/Plan     74 yr old with MCTD with inflammatory myositis and ILD. Her symptoms has flared. Advised to take prednisone 20 mg/day x 2 weeks and then reduce to 15 mg/day x 2 weeks, and then 12.5 mg/day 2 weeks and then 10 mg/day x 1 mos. I suggested that she may need to stay on prednisone 5 mg/day,    I suggest to add Imuran 50 mg BID.   She will follow up with Dr. Duckworth.       electronically signed by Lisbeth Jacome

## 2024-08-09 ENCOUNTER — APPOINTMENT (OUTPATIENT)
Dept: CARDIOLOGY | Facility: CLINIC | Age: 74
End: 2024-08-09
Payer: MEDICAID

## 2024-08-12 NOTE — PROGRESS NOTES
Pulmonary Consult    Follow up visit for Connective tissue disease related ILD, I have independently interviewed and examined the patient in the office and reviewed available records.    Physician HPI  This a a 74 year old woman with diagnosis of  inflammatory myopathy (anti-Ku positive, mixed connective tissue disease, Afib (eliquis), and osteoarthritis, Vitamin D deficiency, she was referred initially in 11/2023 to the Pulmonary clinic for suspected CTD-ILD  She come today 8/23/2024 for follow up visit ,   Since previous visit, she had developed muscle weakness and joint pain with worsening inflammatory markers and had a diagnosis of myositis flare required to be put on burst steroids.  she was on mycophenolate that caused her to have gastric upset which was changed to Myfortic  with persistent symptoms and eventually had to go back to Imuran per rheumatology recommendation.  Prior to her recent flare she was on tapering dose of prednisone when she reached to 5 mg she started complaining of muscle pain hand and finger weakness, labs showed elevated aldolase and CK of 225 and had a diagnosis of myositis flare .  Also she developed multiple attacks of paroxysmal Afib with rapid rate     she had a CT chest that showed Mild pulmonary fibrotic changes, her PFT follow up shows reserved Pulmonary function  She denies any cough expectoration or shortness of breath,  She denies any fever chest wheezes or hemoptysis    Documentation of the initial encounter 11/2023  Her medical problems started from 2019 with severe muscle weakness and fatigue and pain of swelling of the fingers.  Her symptoms had been progressive with periods of regression on short course of steroids.  Her serological markers are positive for myositis markers antichromatin antinuclear antibody and antecubital are all positive also markedly elevated CK at 1000, She underwent MM bx 5/17/23 for Left deltoid and left rectus femoris  muscle biopsy that showed  inflammatory myopathy with both endomysial and perimysial/vascular inflammation. Also found to have anti-Ku antibodies and fibrosis in the lungs. She was started on steroid 60 mg with very slow tapering down she is on 8.5 mg of prednisone and 50 mg 3 times daily of Imuran, her symptoms are stable .  She complains of minimal shortness of breath with activity but denies any dry or productive cough, no fever or chest pain  She denies any skin lesions rash., she complains of Foreign body sensation in the eye , and epigastric pain  No swelling of the joints but has right hip degenerative disease requiring hip replacement.  She has CT chest done in June 2023 that showed subpleural reticulation also shows lesion in the pancreas and she is being seen by general surgery at [facility] for follow up.     immunization History:  Immunization History   Administered Date(s) Administered    Moderna COVID-19 vaccine, bivalent, blue cap/gray label *Check age/dose* 07/21/2023    Pneumococcal conjugate vaccine, 20-valent (PREVNAR 20) 07/27/2023    Zoster vaccine, recombinant, adult (SHINGRIX) 06/03/2021, 06/04/2021, 09/16/2021       Family History:  Family History   Problem Relation Name Age of Onset    Kidney cancer Mother      Kidney disease Father      Kidney cancer Father         Social History:  Social History     Socioeconomic History    Marital status: Single   Tobacco Use    Smoking status: Never    Smokeless tobacco: Never   Substance and Sexual Activity    Alcohol use: Not Currently    Drug use: Never    Sexual activity: Defer     Social Determinants of Health     Financial Resource Strain: Low Risk  (6/15/2023)    Received from [facility]    Overall Financial Resource Strain (CARDIA)     Difficulty of Paying Living Expenses: Not hard at all   Food Insecurity: No Food Insecurity (6/15/2023)    Received from [facility]    Hunger Vital Sign     Worried About Running Out of Food in the  Last Year: Never true     Ran Out of Food in the Last Year: Never true   Transportation Needs: No Transportation Needs (6/15/2023)    Received from [facility]    PRAPARE - Transportation     Lack of Transportation (Medical): No     Lack of Transportation (Non-Medical): No   Physical Activity: Unknown (6/8/2022)    Received from [facility]    Exercise Vital Sign     Days of Exercise per Week: Patient declined     Minutes of Exercise per Session: Patient declined   Stress: No Stress Concern Present (6/8/2022)    Received from [facility] [place] [facility] Occupational [facility] - Occupational Stress Questionnaire     Feeling of Stress : Not at all   Social Connections: Moderately Integrated (6/8/2022)    Received from [facility]    Social Connection and Isolation Panel [NHANES]     Frequency of Communication with Friends and Family: More than three times a week     Frequency of Social Gatherings with Friends and Family: Patient declined     Attends Gnosticism Services: More than 4 times per year     Active Member of Clubs or Organizations: Yes     Attends Club or Organization Meetings: More than 4 times per year     Marital Status:    Housing Stability: Low Risk  (6/15/2023)    Received from [facility]    Housing Stability Vital Sign     Unable to Pay for Housing in the Last Year: No     Number of Places Lived in the Last Year: 1     Unstable Housing in the Last Year: No       Current Medications:  Current Outpatient Medications   Medication Instructions    azaTHIOprine (IMURAN) 50 mg, oral, 2 times daily    bisoprolol (Zebeta) 5 mg tablet 1.5 tablets, oral, Nightly    cholecalciferol (Vitamin D3) 50 mcg (2,000 unit) capsule 1 capsule, oral, Daily    cyanocobalamin (VITAMIN B-12) 1,000 mcg, oral, Daily    dofetilide (TIKOSYN) 250 mcg, oral, 2 times daily    Eliquis 5 mg tablet 1 tablet,  "oral, 2 times daily    fish oil concentrate (Fish OiL) 120-180 mg capsule oral    magnesium 30 mg, oral, Daily    melatonin 5 mg, oral, Nightly    predniSONE (DELTASONE) 4 mg, oral, Daily    predniSONE (DELTASONE) 5 mg, oral, Daily    vit B complex 100 combo no.2 (B-100 COMPLEX ORAL) 1 capsule, oral, Daily        Drug Allergies/Intolerances:  No Known Allergies     Review of Systems:  All other review of systems are negative and/or non-contributory.    Physical Examination:  /86   Pulse 85   Temp 36.8 °C (98.2 °F) (Temporal)   Ht 1.6 m (5' 3\")   Wt 81.2 kg (179 lb)   SpO2 99%   BMI 31.71 kg/m²      General: ambulated independently; no acute distress; well-nourished; work of breathing was not increased; normal vocal character  HEENT: normocephalic; anicteric sclerae; conjunctivae not injected; nasal mucosa was unremarkable; oropharynx was clear without evidence of thrush; dentition was good.  Neck: supple; no lymphadenopathy or thyromegaly.  Chest: Bilateral inspiratory crepitation   cardiac: regular rhythm; no gallop or murmur.  Abdomen: soft; non-tender; non-distended; no hepatosplenomegaly.  Extremities: Varicose vein veins bilateral   psychiatric: did not appear depressed or anxious.    Pulmonary Function Test Results     8/1/24 - Printed - has not been signed    Chest Radiograph     XR chest 2 views 04/13/2023    Narrative  * * *Final Report* * *    DATE OF EXAM: Apr 13 2023  1:36PM    FVX   [id number]  -  XR CHEST 2V FRONTAL/LAT  / ACCESSION #  [id number]    PROCEDURE REASON: multiple diagnoses    * * * * Physician Interpretation * * * *    EXAMINATION:  CHEST RADIOGRAPH (2 VIEW FRONTAL & LATERAL)    CLINICAL HISTORY: Paroxysmal atrial fibrillation (HCC) Systolic  dysfunction  MQ:  [id number]    EXAM DATE/TIME:  4/13/2023 1:36 PM    COMPARISON:  10/11/2022      RESULT:    Lines, tubes, and devices:  None.    Lungs and pleura:  No consolidation. No lung mass. No pleural effusion.  No " pneumothorax.    Cardiomediastinal silhouette:  Normal cardiomediastinal silhouette.    Bones and soft tissues:  Unremarkable.    Impression  IMPRESSION:    No acute radiographic abnormality.                : PSCB  Transcribe Date/Time: Apr 13 2023 10:22P    Dictated by : ION JONES MD    This examination was interpreted and the report reviewed and  electronically signed by:  ION JONES MD on Apr 13 2023 10:22PM  EST      Echocardiogram     No testing done       Chest CT Scan     2/15/24  CT chest wo IV contrast  Status: Final result     PACS Images     Show images for CT chest wo IV contrast  Signed by    Signed Time Phone Pager   Serg Sheridan MD 2/16/2024 13:27 [phone]     Exam Information    Status Exam Begun Exam Ended   Final 2/15/2024 11:07 2/15/2024 11:24     Study Result    Narrative & Impression   Interpreted By:  Serg Sheridan,   STUDY:  CT CHEST WO IV CONTRAST;  2/15/2024 11:24 am      INDICATION:  Signs/Symptoms:ILD.      COMPARISON:  None.      ACCESSION NUMBER(S):  [id number]      ORDERING CLINICIAN:  EZIO BAUMAN      TECHNIQUE:  Helical data acquisition of the chest was obtained  without IV  contrast material.  Images were reformatted in axial, coronal, and  sagittal planes.      FINDINGS:  Lungs and Pleura: Mild scattered fibrotic changes, greatest in the  lung bases. No honeycombing or bronchiectasis. Left lower lobe  pneumatocele. No pulmonary consolidation. No pleural effusion. No  pneumothorax.      Mediastinum and axilla: No adenopathy by CT size criteria. No  cardiomegaly or pericardial effusion. No thoracic aortic aneurysm.  Atherosclerosis. Aberrant right subclavian artery. Small hiatal  hernia with patulous distal esophagus.      Visualized Upper Abdomen: Subcentimeter right renal hypodensity, too  small to characterize. Pancreatic cystic lesion in the anterior  aspect of the body measuring 10 mm. Multifocal scarring left kidney.      MSK/Chest  Wall: No aggressive bony lesion identified. Multilevel  degenerative change in the spine.          IMPRESSION:  Mild pulmonary fibrotic changes without honeycombing or  bronchiectasis.      Small hiatal hernia with patulous distal esophagus.      Pancreatic cystic lesion in the body may be further assessed with  pancreas MRI.      Signed by: Serg Sheridan 2/16/2024 1:27 PM  Dictation workstation:   [id number]          Co-morbidities Problem List    Assessment and Plan / Recommendations:  Problem List Items Addressed This Visit    None  Visit Diagnoses       ILD (interstitial lung disease) (Multi)    -  Primary    Relevant Orders    Complete Pulmonary Function Test Pre/Post Bronchodialator (Spirometry Pre/Post/DLCO/Lung Volumes)    Oximetry Desat/O2 Titration (Exercise Desat)           -CTD- ILD: Connective tissue disease related ILD  CT images reviewed shows subpleural reticulation and thin-walled cysts   possibility of LIP , lymphocytic interstitial pneumonia versus NSIP nonspecific interstitial pneumonia  High-resolution CT in February 2024 shows minimal fibrotic changes  PFT no evidence of obstruction or restriction  Minimal respiratory symptoms, oxygen saturation above 95.  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:  Positive GLADYS anti KU antichromatin, high CK, positive muscle biopsy  Recent myositis flare with elevated CK , back on high dose of steroids after  trials of tapering  She follows with Dr Walker and Dr Duckworth      -Patulous esophagus:  CT chest and abdomen shows wide open esophagus  High risk for reflux and microaspiration  Recommend pantoprazole 40 mg at night    patient is unwilling to take PPI     -Sjogren disease:  Positive  anti-SSA, and foreign body sensation eyes  Recommend annual eye examination     -Other medical problem:  A-fib: On Eliquis, recent attacks of paroxysmal rapid afib  Osteoarthritis of the hip     Follow up  visit in 6 month  I have independently interviewed and examined the patient in the office and reviewed available records.  I personally reviewed CT images and lab reports    Professional time of this encounter 45 minutes    Freddy Duncan MD

## 2024-08-14 ENCOUNTER — LAB (OUTPATIENT)
Dept: LAB | Facility: LAB | Age: 74
End: 2024-08-14
Payer: MEDICARE

## 2024-08-14 DIAGNOSIS — M60.9 MYOSITIS, UNSPECIFIED MYOSITIS TYPE, UNSPECIFIED SITE: ICD-10-CM

## 2024-08-14 DIAGNOSIS — Z79.899 LONG-TERM USE OF HIGH-RISK MEDICATION: ICD-10-CM

## 2024-08-14 DIAGNOSIS — G72.49 IDIOPATHIC INFLAMMATORY MYOPATHY: ICD-10-CM

## 2024-08-14 DIAGNOSIS — Z79.52 LONG TERM (CURRENT) USE OF SYSTEMIC STEROIDS: ICD-10-CM

## 2024-08-14 DIAGNOSIS — M33.20 POLYMYOSITIS (MULTI): ICD-10-CM

## 2024-08-14 LAB
ALBUMIN SERPL BCP-MCNC: 3.6 G/DL (ref 3.4–5)
ALP SERPL-CCNC: 40 U/L (ref 33–136)
ALT SERPL W P-5'-P-CCNC: 19 U/L (ref 7–45)
ANION GAP SERPL CALC-SCNC: 13 MMOL/L (ref 10–20)
AST SERPL W P-5'-P-CCNC: 40 U/L (ref 9–39)
BILIRUB SERPL-MCNC: 0.7 MG/DL (ref 0–1.2)
BUN SERPL-MCNC: 14 MG/DL (ref 6–23)
CALCIUM SERPL-MCNC: 8.8 MG/DL (ref 8.6–10.3)
CHLORIDE SERPL-SCNC: 104 MMOL/L (ref 98–107)
CK SERPL-CCNC: 190 U/L (ref 0–215)
CO2 SERPL-SCNC: 27 MMOL/L (ref 21–32)
CREAT SERPL-MCNC: 0.53 MG/DL (ref 0.5–1.05)
EGFRCR SERPLBLD CKD-EPI 2021: >90 ML/MIN/1.73M*2
GLUCOSE SERPL-MCNC: 141 MG/DL (ref 74–99)
POTASSIUM SERPL-SCNC: 4.2 MMOL/L (ref 3.5–5.3)
PROT SERPL-MCNC: 6.8 G/DL (ref 6.4–8.2)
SODIUM SERPL-SCNC: 140 MMOL/L (ref 136–145)

## 2024-08-14 PROCEDURE — 36415 COLL VENOUS BLD VENIPUNCTURE: CPT

## 2024-08-14 PROCEDURE — 80053 COMPREHEN METABOLIC PANEL: CPT

## 2024-08-14 PROCEDURE — 82085 ASSAY OF ALDOLASE: CPT

## 2024-08-14 PROCEDURE — 82550 ASSAY OF CK (CPK): CPT

## 2024-08-16 LAB — ALDOLASE SERPL-CCNC: 6 U/L (ref 1.2–7.6)

## 2024-08-23 ENCOUNTER — APPOINTMENT (OUTPATIENT)
Dept: PULMONOLOGY | Facility: CLINIC | Age: 74
End: 2024-08-23
Payer: COMMERCIAL

## 2024-08-23 VITALS
DIASTOLIC BLOOD PRESSURE: 86 MMHG | TEMPERATURE: 98.2 F | HEIGHT: 63 IN | WEIGHT: 179 LBS | HEART RATE: 85 BPM | OXYGEN SATURATION: 99 % | BODY MASS INDEX: 31.71 KG/M2 | SYSTOLIC BLOOD PRESSURE: 127 MMHG

## 2024-08-23 DIAGNOSIS — J84.9 ILD (INTERSTITIAL LUNG DISEASE) (MULTI): Primary | ICD-10-CM

## 2024-08-23 DIAGNOSIS — M35.1 MIXED CONNECTIVE TISSUE DISEASE (MULTI): ICD-10-CM

## 2024-08-23 PROCEDURE — 1157F ADVNC CARE PLAN IN RCRD: CPT | Performed by: INTERNAL MEDICINE

## 2024-08-23 PROCEDURE — 1125F AMNT PAIN NOTED PAIN PRSNT: CPT | Performed by: INTERNAL MEDICINE

## 2024-08-23 PROCEDURE — 99214 OFFICE O/P EST MOD 30 MIN: CPT | Performed by: INTERNAL MEDICINE

## 2024-08-23 PROCEDURE — 1159F MED LIST DOCD IN RCRD: CPT | Performed by: INTERNAL MEDICINE

## 2024-08-23 ASSESSMENT — ENCOUNTER SYMPTOMS
LOSS OF SENSATION IN FEET: 0
OCCASIONAL FEELINGS OF UNSTEADINESS: 0
DEPRESSION: 0

## 2024-08-23 ASSESSMENT — PATIENT HEALTH QUESTIONNAIRE - PHQ9
SUM OF ALL RESPONSES TO PHQ9 QUESTIONS 1 AND 2: 0
2. FEELING DOWN, DEPRESSED OR HOPELESS: NOT AT ALL
1. LITTLE INTEREST OR PLEASURE IN DOING THINGS: NOT AT ALL

## 2024-08-23 ASSESSMENT — PAIN SCALES - GENERAL: PAINLEVEL: 2

## 2024-08-26 ENCOUNTER — PATIENT MESSAGE (OUTPATIENT)
Dept: RHEUMATOLOGY | Facility: CLINIC | Age: 74
End: 2024-08-26
Payer: MEDICARE

## 2024-08-26 DIAGNOSIS — G72.49 IDIOPATHIC INFLAMMATORY MYOPATHY: Primary | ICD-10-CM

## 2024-08-26 RX ORDER — PREDNISONE 5 MG/1
5 TABLET ORAL DAILY
Qty: 30 TABLET | Refills: 1 | Status: SHIPPED | OUTPATIENT
Start: 2024-08-26

## 2024-09-06 ENCOUNTER — APPOINTMENT (OUTPATIENT)
Dept: PRIMARY CARE | Facility: CLINIC | Age: 74
End: 2024-09-06
Payer: COMMERCIAL

## 2024-09-06 ENCOUNTER — LAB (OUTPATIENT)
Dept: LAB | Facility: LAB | Age: 74
End: 2024-09-06
Payer: MEDICARE

## 2024-09-06 VITALS
HEART RATE: 61 BPM | BODY MASS INDEX: 31.93 KG/M2 | OXYGEN SATURATION: 98 % | TEMPERATURE: 97.3 F | RESPIRATION RATE: 18 BRPM | HEIGHT: 63 IN | WEIGHT: 180.2 LBS | SYSTOLIC BLOOD PRESSURE: 138 MMHG | DIASTOLIC BLOOD PRESSURE: 81 MMHG

## 2024-09-06 DIAGNOSIS — M33.20 POLYMYOSITIS (MULTI): ICD-10-CM

## 2024-09-06 DIAGNOSIS — I48.0 PAROXYSMAL ATRIAL FIBRILLATION (MULTI): ICD-10-CM

## 2024-09-06 DIAGNOSIS — E55.9 VITAMIN D DEFICIENCY: ICD-10-CM

## 2024-09-06 DIAGNOSIS — E78.5 DYSLIPIDEMIA: ICD-10-CM

## 2024-09-06 DIAGNOSIS — Z00.00 ROUTINE GENERAL MEDICAL EXAMINATION AT HEALTH CARE FACILITY: Primary | ICD-10-CM

## 2024-09-06 DIAGNOSIS — M35.1 MCTD (MIXED CONNECTIVE TISSUE DISEASE) (MULTI): ICD-10-CM

## 2024-09-06 DIAGNOSIS — Z79.52 LONG TERM (CURRENT) USE OF SYSTEMIC STEROIDS: ICD-10-CM

## 2024-09-06 DIAGNOSIS — Z00.00 ROUTINE GENERAL MEDICAL EXAMINATION AT HEALTH CARE FACILITY: ICD-10-CM

## 2024-09-06 DIAGNOSIS — M81.6 LOCALIZED OSTEOPOROSIS WITHOUT CURRENT PATHOLOGICAL FRACTURE: ICD-10-CM

## 2024-09-06 DIAGNOSIS — J84.9 INTERSTITIAL LUNG DISEASE (MULTI): ICD-10-CM

## 2024-09-06 LAB
ALBUMIN SERPL BCP-MCNC: 3.8 G/DL (ref 3.4–5)
ALP SERPL-CCNC: 33 U/L (ref 33–136)
ALT SERPL W P-5'-P-CCNC: 16 U/L (ref 7–45)
ANION GAP SERPL CALC-SCNC: 14 MMOL/L (ref 10–20)
AST SERPL W P-5'-P-CCNC: 42 U/L (ref 9–39)
BILIRUB SERPL-MCNC: 0.5 MG/DL (ref 0–1.2)
BUN SERPL-MCNC: 14 MG/DL (ref 6–23)
CALCIUM SERPL-MCNC: 9.1 MG/DL (ref 8.6–10.3)
CHLORIDE SERPL-SCNC: 103 MMOL/L (ref 98–107)
CHOLEST SERPL-MCNC: 213 MG/DL (ref 0–199)
CHOLESTEROL/HDL RATIO: 3.7
CK SERPL-CCNC: 292 U/L (ref 0–215)
CO2 SERPL-SCNC: 27 MMOL/L (ref 21–32)
CREAT SERPL-MCNC: 0.56 MG/DL (ref 0.5–1.05)
EGFRCR SERPLBLD CKD-EPI 2021: >90 ML/MIN/1.73M*2
GLUCOSE SERPL-MCNC: 106 MG/DL (ref 74–99)
HDLC SERPL-MCNC: 56.9 MG/DL
LDLC SERPL CALC-MCNC: 134 MG/DL
NON HDL CHOLESTEROL: 156 MG/DL (ref 0–149)
POTASSIUM SERPL-SCNC: 4.5 MMOL/L (ref 3.5–5.3)
PROT SERPL-MCNC: 6.9 G/DL (ref 6.4–8.2)
SODIUM SERPL-SCNC: 139 MMOL/L (ref 136–145)
TRIGL SERPL-MCNC: 111 MG/DL (ref 0–149)
VLDL: 22 MG/DL (ref 0–40)

## 2024-09-06 PROCEDURE — 1123F ACP DISCUSS/DSCN MKR DOCD: CPT | Performed by: FAMILY MEDICINE

## 2024-09-06 PROCEDURE — 90662 IIV NO PRSV INCREASED AG IM: CPT | Performed by: FAMILY MEDICINE

## 2024-09-06 PROCEDURE — 80061 LIPID PANEL: CPT

## 2024-09-06 PROCEDURE — 82085 ASSAY OF ALDOLASE: CPT

## 2024-09-06 PROCEDURE — 80053 COMPREHEN METABOLIC PANEL: CPT

## 2024-09-06 PROCEDURE — 1157F ADVNC CARE PLAN IN RCRD: CPT | Performed by: FAMILY MEDICINE

## 2024-09-06 PROCEDURE — 99397 PER PM REEVAL EST PAT 65+ YR: CPT | Performed by: FAMILY MEDICINE

## 2024-09-06 PROCEDURE — 1170F FXNL STATUS ASSESSED: CPT | Performed by: FAMILY MEDICINE

## 2024-09-06 PROCEDURE — 1159F MED LIST DOCD IN RCRD: CPT | Performed by: FAMILY MEDICINE

## 2024-09-06 PROCEDURE — 36415 COLL VENOUS BLD VENIPUNCTURE: CPT

## 2024-09-06 PROCEDURE — 1158F ADVNC CARE PLAN TLK DOCD: CPT | Performed by: FAMILY MEDICINE

## 2024-09-06 PROCEDURE — G0008 ADMIN INFLUENZA VIRUS VAC: HCPCS | Performed by: FAMILY MEDICINE

## 2024-09-06 PROCEDURE — 1036F TOBACCO NON-USER: CPT | Performed by: FAMILY MEDICINE

## 2024-09-06 PROCEDURE — 82550 ASSAY OF CK (CPK): CPT

## 2024-09-06 ASSESSMENT — ACTIVITIES OF DAILY LIVING (ADL)
GROCERY_SHOPPING: INDEPENDENT
DOING_HOUSEWORK: INDEPENDENT
TAKING_MEDICATION: INDEPENDENT
DRESSING: INDEPENDENT
BATHING: INDEPENDENT
MANAGING_FINANCES: INDEPENDENT

## 2024-09-06 ASSESSMENT — ENCOUNTER SYMPTOMS
HEADACHES: 0
SHORTNESS OF BREATH: 0

## 2024-09-06 NOTE — ASSESSMENT & PLAN NOTE
On vit D, calcium.  Advise she discuss treatment with her rheumatologist.         hard copy, drawn during this pregnancy

## 2024-09-06 NOTE — ASSESSMENT & PLAN NOTE
Managed by Dr. Jacome/Dr. Duckworth (rheumatology) for hx of MCTD (mixed connective tissue disease) with myositis. She is on prednisone chronically (since 2023), trying to taper off .  She is on imuran for her myopathy.

## 2024-09-06 NOTE — ASSESSMENT & PLAN NOTE
She has hx of connective tissue disease related interstitial lung disease, sees  pulmonology, Dr. Fredyd Duncan.

## 2024-09-06 NOTE — PROGRESS NOTES
"Subjective     Nora Bynum is a 74 y.o. female who presents for Medicare Annual Wellness Visit Subsequent.    HPI     Pt is new to me.  She is here to establish care and have her medicare well exam.  Her former PCP was Dr. Powers at [facility].     She has hx of connective tissue disease related interstitial lung disease, sees  pulmonology, Dr. Freddy Duncan.      Pt sees Dr. Jacome/Dr. Duckworth rheumatologist for hx of MCTD (mixed connective tissue disease) with myositis. She is on prednisone chronically (since 2023), trying to taper off .  She is on imuran for her myopathy.  She has osteoporosis.      She has atrial fib (paroxysmal), on eliquis (started in 2021), and on dofetilide (Tikosyn) sees [facility] EP cardiology, Dr. Alicea.  She has OA and vit D def. Sjogren disease.      She has hx of HLD, not on statin.     Hx of pancreatic cysts, monitored by Dr. Kirk, [facility]      Review of Systems   Respiratory:  Negative for shortness of breath.    Cardiovascular:  Negative for chest pain.   Neurological:  Negative for headaches.       Objective     Vitals:    09/06/24 1113   BP: 138/81   BP Location: Right arm   Patient Position: Sitting   Pulse: 61   Resp: 18   Temp: 36.3 °C (97.3 °F)   TempSrc: Temporal   SpO2: 98%   Weight: 81.7 kg (180 lb 3.2 oz)   Height: 1.6 m (5' 3\")        Current Outpatient Medications   Medication Instructions    azaTHIOprine (IMURAN) 50 mg, oral, 2 times daily    bisoprolol (Zebeta) 5 mg tablet 1.5 tablets, oral, Nightly    cholecalciferol (Vitamin D3) 50 mcg (2,000 unit) capsule 1 capsule, oral, Daily    cyanocobalamin (VITAMIN B-12) 1,000 mcg, oral, Daily    dofetilide (TIKOSYN) 250 mcg, oral, 2 times daily    Eliquis 5 mg tablet 1 tablet, oral, 2 times daily    fish oil concentrate (Fish OiL) 120-180 mg capsule oral    magnesium 30 mg, oral, Daily    melatonin 5 mg, oral, Nightly    predniSONE (DELTASONE) 4 mg, oral, Daily    predniSONE (DELTASONE) 5 mg, oral, Daily    vit B complex 100 combo " no.2 (B-100 COMPLEX ORAL) 1 capsule, oral, Daily        No Known Allergies     Past Surgical History:   Procedure Laterality Date    OTHER SURGICAL HISTORY  06/23/2020    Cyst excision        Social History     Tobacco Use    Smoking status: Never    Smokeless tobacco: Never   Substance Use Topics    Alcohol use: Not Currently    Drug use: Never        Social History     Substance and Sexual Activity   Alcohol Use Not Currently       Family History   Problem Relation Name Age of Onset    Kidney cancer Mother      Kidney disease Father      Kidney cancer Father          Immunization History   Administered Date(s) Administered    Flu vaccine, trivalent, preservative free, HIGH-DOSE, age 65y+ (Fluzone) 09/06/2024    Moderna COVID-19 vaccine, bivalent, blue cap/gray label *Check age/dose* 07/21/2023    Moderna SARS-CoV-2 Vaccination 07/29/2022    Pfizer Purple Cap SARS-CoV-2 01/28/2021, 02/18/2021, 12/20/2021    Pneumococcal conjugate vaccine, 20-valent (PREVNAR 20) 07/27/2023    Zoster vaccine, recombinant, adult (SHINGRIX) 06/03/2021, 06/04/2021, 09/16/2021        Physical Exam  Vitals reviewed.   Constitutional:       General: She is not in acute distress.     Appearance: Normal appearance. She is not ill-appearing.   HENT:      Head: Normocephalic and atraumatic.      Right Ear: Tympanic membrane, ear canal and external ear normal.      Left Ear: Tympanic membrane, ear canal and external ear normal.      Nose: Nose normal.      Mouth/Throat:      Mouth: Mucous membranes are moist.      Pharynx: No oropharyngeal exudate or posterior oropharyngeal erythema.   Eyes:      Conjunctiva/sclera: Conjunctivae normal.   Neck:      Thyroid: No thyroid mass or thyromegaly.   Cardiovascular:      Rate and Rhythm: Normal rate and regular rhythm.      Heart sounds: No murmur heard.  Pulmonary:      Effort: No respiratory distress.      Breath sounds: Normal breath sounds. No wheezing, rhonchi or rales.   Abdominal:      General:  Abdomen is flat. There is no distension.      Palpations: Abdomen is soft. There is no mass.      Tenderness: There is no abdominal tenderness.   Musculoskeletal:         General: Normal range of motion.   Lymphadenopathy:      Cervical: No cervical adenopathy.   Skin:     General: Skin is warm and dry.      Findings: No lesion or rash.   Neurological:      Mental Status: She is alert and oriented to person, place, and time. Mental status is at baseline.   Psychiatric:         Mood and Affect: Mood normal.         Behavior: Behavior normal.         Assessment & Plan  Routine general medical examination at health care facility  Medicare well exam - new patient     colon screening - negative cologuard 11/2023     mammogram - declines mammogram.     dexa scan status - 7/2023, osteoporosis - on vit D/calcium.  Pt not on bisphosphonate, follow up with rheumatology.     shingrix vaccine status - utd     pneumonia vaccine status - utd with prevnar 20    Flu vaccine given today     I recommend yearly flu vaccine and routine COVID vaccinations as indicated     Healthy diet, routine exercise was discussed with patient      Living will/MPOA discussed    Screening questions completed (Fall /depression/ADL/IADL/Home Safety/Functional ability)    Orders:    Lipid Panel; Future    Paroxysmal atrial fibrillation (Multi)  atrial fib (paroxysmal), on eliquis (started in 2021), and on dofetilide (Tikosyn) sees [facility] EP cardiology, Dr. Alicea.       Vitamin D deficiency  On vit D.        MCTD (mixed connective tissue disease) (Multi)  Managed by Dr. Jacome/Dr. Dcukworth (rheumatology) for hx of MCTD (mixed connective tissue disease) with myositis. She is on prednisone chronically (since 2023), trying to taper off .  She is on imuran for her myopathy.       Localized osteoporosis without current pathological fracture  On vit D, calcium.  Advise she discuss treatment with her rheumatologist.        Dyslipidemia    Orders:    Lipid Panel;  Future    Long term (current) use of systemic steroids         Interstitial lung disease (Multi)  She has hx of connective tissue disease related interstitial lung disease, sees  pulmonology, Dr. Freddy Duncan.

## 2024-09-06 NOTE — ASSESSMENT & PLAN NOTE
atrial fib (paroxysmal), on eliquis (started in 2021), and on dofetilide (Tikosyn) sees [facility] EP cardiology, Dr. Alicea.

## 2024-09-08 NOTE — PROGRESS NOTES
Subjective   Patient ID: [id number]   Nora Bynum is a 74 y.o. female with OA of right hip, inflammatory myopathy (anti-Ku positive) with MCTD, Afib (eliquis), and vitamin D deficiency, presenting for follow up.   Previously saw Dr. Jacome and Dr. Barbosa (last visit 5/30/23), Dr. Genao in 9/23, saw me once in 10/23, then followed up with Dr. Greco for insurance purposes, kept seeing Dr. Jacome in between and now back to me    Current IS:  - Prednisone 20 mg daily (went back up to in 8/24)  - Imuran 50mg BID, restarted in 8/24    Previous:  - MMF caused GI SE  - Myfortic, also causes GI SE, switched back to AZA    HPI  She was seeing Dr. Greco and on Myfortic with prednisone, she was tapering down her steroids till 5 mg and had a flare of her myositis with elevated CPK, CRP and weakness as well as Afib, saw Dr. Jacome who increased her prednisone and recommended adding AZA, but pt stopped myfortic on her own due to GI upset, improved with increasing steroids  Saw pulm Dr. 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  Recommend:  PFT every 6 months  Could not tolerate myfortic  and switched back to imuran    Antifibrotic is not indicated currently  (Ofev)  Recommend annual echocardiography for pulm HTN  Saw cardio for afib, echo cardio with normal EF and no pulm HTN at [facility]   Saw general surgery for pancreatic cysts, follow up imaging in 2 years  Patient doesn't want to be switched to methotrexate due to risk of pneumonitis with her ILD  She I also weary of the IVIg   No rashes, alopecia or photosensitivity  No oral or nasal ulcers  No episodes of eye inflammation  No chest pain, cough or dyspnea  No GI/ sx    No infections  Compliant with meds  No side effects reported      ROS  As per HPI     Rheum hx (Recall from Dr. Genao's notes):  Previously saw Dr. Jacome at [facility] since 2019. Nml CK, RF 16, ESR 47. Then saw Dr. Gomes and Elan (12/2019 GLADYS > 1:1280, negative JUSTIN, nml C3/C4, RF/CCP negative)   Sjogrens, SSA+ , Raynauds, dry mouth since 2019 June 2022, Prednisone 20mg daily 6/25/23 by Dr. Pierson for possible PMR (also hx of elevated CK up to 1411 previously), followed up with Dr. Jacome 4/2023: fatigue, muscle weakness in shoulder > hip, CK 1649, Raynauds - concern for inflammatory myopathy. EMG done by Dr. Thakur (Neurology) and recommended for muscle biopsy. She underwent MM bx 5/17/23 for Left deltoid and left rectus femoris. Pt was called by Dr. 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      She followed up with Alphonso Stanton on 5/30/23: On Pred 60mg daily and Azathioprine 150mg daily. Muscle pain improved, steorid taper by 10mg in 4 weeks, then 50mg X3 weeks, then 40mg X2 weeks. Continue Aza 150mg daily.      Pt's initial presenting sx were bilateral shoulder and leg stiffness, weakness, UE >LE. The arm weakness and limitations in activity, difficulty doing overhead activity, started December 2022/ Jan 2023 with progressive weakness. Weakness in hip girdle.   90% improvement with steroids and AZA.     Associated/ROS symptoms:   + dry mouth, dry eyes, started in the more recent years  Denies fever, or current unintentional weight loss (reports some weight loss intentionally with intermittent fasting but overall has had ~100 lb weight loss over the last few years)  Denies hx of uveitis (gets eye exam annually)  Denies photosensitive rash  No hx of blood clots  Denies current difficulty swallowing.   Denies hx of seizure/CVA. + A fib (Dr. Pritchett/[facility]) , has had Echo. On Dofetilide/Eliquis for A fib  Ct chest abd/pelvis was ordered for  malignancy workup. She had CT chest [facility] at [facility]. CT abd/pelvis not done yet.      FM of autoimmune disease: Unsure  PSH: Plans for hip replacement with Dr. Killian due to OA however due to inflammatory myopathy, unable to get this done currently.   SocHX: Never smoker.     Patient Active Problem List   Diagnosis    Abnormal TSH    Edema    Herpes zoster without complication    Mild vitamin D deficiency    Myalgia, unspecified site    Myopathy    Osteoarthritis of hip    Polyarthritis    Positive Lyme disease serology    Stiffness of joints of both hands    Arthritis of shoulder    MCTD (mixed connective tissue disease) (Multi)    Lung fibrosis (Multi)    Long term (current) use of systemic steroids    Long-term use of high-risk medication    Bilateral hand pain    Chronic pain of both shoulders    Decreased range of motion of right shoulder    Dyslipidemia    Elevated blood pressure reading without diagnosis of hypertension    HyperCKemia    Nuclear senile cataract of both eyes    Obesity, Class I, BMI 30-34.9    Paroxysmal atrial fibrillation (Multi)    Osteoarthritis of both knees    Perianal abscess    Pneumonia due to COVID-19 virus    Polyarthralgia    Bilateral hip pain    Systolic dysfunction    Thyroid antibody positive    Weakness generalized    Vitamin D deficiency    Shoulder stiffness, right    Stiffness of right hand joint    Idiopathic inflammatory myopathy    Localized osteoporosis without current pathological fracture    Interstitial lung disease (Multi)     Past Medical History:   Diagnosis Date    Other specified health status     No pertinent past medical history     Past Surgical History:   Procedure Laterality Date    OTHER SURGICAL HISTORY  06/23/2020    Cyst excision     Social History     Socioeconomic History    Marital status: Single     Spouse name: Not on file    Number of children: Not on file    Years of education: Not on file    Highest education level: Not on file   Occupational  History    Not on file   Tobacco Use    Smoking status: Never    Smokeless tobacco: Never   Substance and Sexual Activity    Alcohol use: Not Currently    Drug use: Never    Sexual activity: Defer   Other Topics Concern    Not on file   Social History Narrative    Not on file     Social Determinants of Health     Financial Resource Strain: Low Risk  (6/15/2023)    Received from [facility]    Overall Financial Resource Strain (CARDIA)     Difficulty of Paying Living Expenses: Not hard at all   Food Insecurity: No Food Insecurity (6/15/2023)    Received from [facility]    Hunger Vital Sign     Worried About Running Out of Food in the Last Year: Never true     Ran Out of Food in the Last Year: Never true   Transportation Needs: No Transportation Needs (6/15/2023)    Received from [facility]    PRAPARE - Transportation     Lack of Transportation (Medical): No     Lack of Transportation (Non-Medical): No   Physical Activity: Unknown (6/8/2022)    Received from [facility]    Exercise Vital Sign     Days of Exercise per Week: Patient declined     Minutes of Exercise per Session: Patient declined   Stress: No Stress Concern Present (6/8/2022)    Received from [facility]    [employer] [place] [employer] Occup[employer] - Occupational Stress Questionnaire     Feeling of Stress : Not at all   Social Connections: Moderately Integrated (6/8/2022)    Received from [facility]    Social Connection and Isolation Panel [NHANES]     Frequency of Communication with Friends and Family: More than three times a week     Frequency of Social Gatherings with Friends and Family: Patient declined     Attends Confucianist Services: More than 4 times per year     Active Member of Clubs or Organizations: Yes     Attends Club or Organization Meetings: More than 4 times per year     Marital Status:     Intimate Partner Violence: Not on file   Housing Stability: Low Risk  (6/15/2023)    [facility]    Housing Stability Vital Sign     Unable to Pay for Housing in the Last Year: No     Number of Places Lived in the Last Year: 1     Unstable Housing in the Last Year: No     No Known Allergies     Current Outpatient Medications:     azaTHIOprine (Imuran) 50 mg tablet, Take 1 tablet (50 mg) by mouth 2 times a day., Disp: 60 tablet, Rfl: 3    bisoprolol (Zebeta) 5 mg tablet, Take 1.5 tablets (7.5 mg) by mouth once daily at bedtime., Disp: , Rfl:     cholecalciferol (Vitamin D3) 50 mcg (2,000 unit) capsule, Take 1 capsule (50 mcg) by mouth once daily., Disp: , Rfl:     cyanocobalamin (Vitamin B-12) 1,000 mcg tablet, Take 1 tablet (1,000 mcg) by mouth once daily., Disp: , Rfl:     dofetilide (Tikosyn) 250 mcg capsule, Take 1 capsule (250 mcg) by mouth twice a day., Disp: , Rfl:     Eliquis 5 mg tablet, Take 1 tablet (5 mg) by mouth 2 times a day., Disp: , Rfl:     fish oil concentrate (Fish OiL) 120-180 mg capsule, Take by mouth., Disp: , Rfl:     magnesium 30 mg tablet, Take 1 tablet (30 mg) by mouth once daily., Disp: , Rfl:     melatonin 5 mg tablet, Take 1 tablet (5 mg) by mouth once daily at bedtime., Disp: , Rfl:     predniSONE (Deltasone) 1 mg tablet, Take 4 tablets (4 mg) by mouth once daily. (Patient taking differently: Take 20 tablets (20 mg) by mouth once daily.), Disp: 120 tablet, Rfl: 2    predniSONE (Deltasone) 10 mg tablet, Take 2 tablets (20 mg) by mouth once daily., Disp: 180 tablet, Rfl: 1    predniSONE (Deltasone) 5 mg tablet, Take 1 tablet (5 mg) by mouth once daily., Disp: 30 tablet, Rfl: 1    vit B complex 100 combo no.2 (B-100 COMPLEX ORAL), Take 1 capsule by mouth once daily., Disp: , Rfl:      Objective   Visit Vitals  /88   Pulse 85   Temp 36.6 °C (97.8 °F)   Resp 20     Physical Exam  General: AAOx3, Cooperative  Head: normocephalic, atraumatic, no  hair loss   Eyes: EOMI, conjunctiva clear, sclera white, anicteric, puffy lower eyelids   Neuro: CN II-XII grossly intact, no focal deficit, motor power 5/5 diffusely   Skin: No rashes, ulcers or photosensitive areas, bilateral LL varicose veins   MSK: Upper Extremities:  Hand/Fingers: No erythema, edema, tenderness or warmth at DIP, PIP, or MCP joints, FROM grossly. Good hand . No nodules. No deformities   Wrists: No erythema, edema, warmth or tenderness at wrist, FROM grossly  Elbows: No tenderness, edema, erythema or warmth at elbows, FROM grossly. No nodules   Shoulders: No edema, erythema, tenderness or warmth at shoulders. FROM  Lower Extremities:   Hips: No obvious deformities. No joint tenderness. No trochanteric bursae TTP  Knees: No tenderness, deformities, edema, rashes, or warmth, normal ROM grossly. No crepitus, no pes anserine bursa TTP   Ankles, feet: No deformities, tenderness, edema, erythema, ulceration, or warmth at the ankle or MTP/IP joints, normal ROM grossly  Spine: No spinal tenderness to palpation. No SI joint tenderness    Lab Results   Component Value Date    WBC 6.1 07/30/2024    HGB 13.8 07/30/2024    HCT 42.6 07/30/2024    MCV 94 07/30/2024     07/30/2024        Chemistry    Lab Results   Component Value Date/Time     09/20/2024 1025    K 4.0 09/20/2024 1025     09/20/2024 1025    CO2 27 09/20/2024 1025    BUN 12 09/20/2024 1025    CREATININE 0.52 09/20/2024 1025    Lab Results   Component Value Date/Time    CALCIUM 8.8 09/20/2024 1025    ALKPHOS 33 09/20/2024 1025    AST 35 09/20/2024 1025    ALT 15 09/20/2024 1025    BILITOT 0.5 09/20/2024 1025        Lab Results   Component Value Date    CRP 1.94 (H) 07/30/2024      Lab Results   Component Value Date    SEDRATE 27 07/30/2024      Lab Results   Component Value Date    HEPBCAB NONREACTIVE 08/14/2023    HEPCAB NONREACTIVE 08/14/2023      Lab Results   Component Value Date    ALT 15 09/20/2024    AST 35 09/20/2024     ALKPHOS 33 09/20/2024    BILITOT 0.5 09/20/2024         === 04/26/23 ===  XR SHOULDERS  Mild degenerative changes bilateral shoulders.      CT chest 2/24:  Mild pulmonary fibrotic changes without honeycombing or bronchiectasis  Small hiatal hernia with patulous distal esophagus  Pancreatic cystic lesion in the body may be further assessed with pancreas MRI    CT abd pelvis 7/23:  1. No abdominopelvic lymphadenopathy.  2. Bilateral subpleural predominant reticular and ground-glass opacities within the bilateral lung dependent regions, likely  secondary to atelectasis, an underlying component of fibrosis cannotbe excluded.  3. Patulous distal esophagus with layering fluid, most compatible with patient's history of Sjogren syndrome.  4. Wedge-shaped low-density defects within the medial aspect of the spleen, likely reflecting chronic splenic infarct.  5. There is an indeterminate 5 mm low-density lesion within the pancreatic body. Recommend further evaluation with MRI pancreas and  MRCP.    MRI abdomen 7/23 pertinent findings:  -Pancreas: Pancreas is normal in precontrast T1 signal intensity with no solid masses or main pancreatic duct dilatation.   -Multiple unilocular cystic pancreatic lesions communicate with the main pancreatic duct are noted for example a 1.3 cm cystic structure in the   pancreatic body (3:37). 3 mm cystic lesion at the junction of the pancreatic body and tail (3:36). No worrisome imaging features.   -Liver: 1 cm right hepatic lobe cyst. No enhancing mass.   -Spleen: Wedge-shaped area of T2 hypointense signal in the spleen may be related to prior trauma.   -Kidneys: 8 mm thinly septated cyst interpolar right kidney.      Biopsy of left deltoid and rectus femoris muscle 5/23:  INFLAMMATORY MYOPATHY    Note: Histopathologic features are qualitatively similar within the biopsy  specimens, however, they are quantitatively more severe within the rectus  femoris muscle.  The biopsies demonstrate both  endomysial and  perimysial/vascular inflammation composed predominantly of CD3 positive T cells, accompanied by muscle fiber necrosis with regeneration (highlighted with antibodies to P62, CD68, and CD56).  Trichrome staining demonstrates no ragged red fibers or rimmed vacuoles, however, it highlights perimysial fibrosis in the rectus femoris muscle.  Myosin heavy chain immunostains show no fiber type grouping.  Cytochrome oxidase staining is within normal limits, and Congo red stain for amyloid is negative.     DEXA 7/23:  Lumbar spine (L1-L4): 0.886 g/cm2, T-score -2.5  Right Femoral Neck: 0.765 g/cm2, T-score -2.0  Right Total Hip: 0.656 g/cm2, T-score -2.8  Left Femoral Neck: 0.659 g/cm2, T-score -2.7  Left Total Hip: 0.729 g/cm2, T-score -2.2      Assessment/Plan    This is a 74Y F with inflammatory myopathy, MCTD with ILD, OP, vitamin D def and OA of hip, presents for follow up on myositis  Last seen by me in 10/24, last seen by Dr. Greco in 4/24 and Dr. Jacome in 8/24     Labs:  9/24: , AST 42, rest of CMP wnl  8/24: AST 40, rest of CMP, CPK, aldolase wnl  7/24: ALC 0.5, AST 42, CRP 1.9, , aldolase, ESR, rest of CBC, CMP wnl  4/24: ESR, CRP, CPK, aldolase, CBC, CMP wnl  1/24: ESR, CRP, CPK, aldolase, vitamin D, CBC, CMP wnl  12/23: CRP 1.8, ALC 0.5, rest of CBC, CMP, CPK, aldolase, ESR, C3, C4 wnl  11/23: CPK, aldolase, ESR, CRP wnl  10/23: LFTs, CPK, aldolase, CBC, CMP, ESR, CRP wnl  6/23 CK 1014 --> 435 --> 349 --> 141 --> 78 (9/23)   Aldolase 22.3 --> 8.7 --> 5.0 (9/23)  5/23: ESR, CRP wnl  4/23: SPEP no definitive M protein. Aldolase 27.3, CK 1649  GLADYS 1:2560, chrom >8  4/23: TPMT 22.4, Tspot TB negative. HBsAg Negative  Extended myositis panel shows + anti- Ku       # Inflammatory myopathy, Extended myositis panel +Ku, MM biopsy CK and aldolase trending down, sx recurring with tapering of pred to 16.5mg  Extensive discussion with the patient about her disease and treatment options  Will  keep the same, since Imuran was only added 1.5 months ago  # MCTD: inflammatory myopathy, Sjogrens (SSA+, GLADYS +, sicca symptoms), Chr + , Raynauds  # Long term systemic steroids  # OP: [facility] D[facility]A done [facility] (7/6/23- reviewed results- pt has osteoporosis worse T score -2.8, on vitamin D and calcium, does not want to add therapies now, would like to have better nutrition first, she was losing weight before and couldn't take her vitamins - briefly discussed BP     - Keep Prednisone 20 mg daily   - Labs on f/u: CK, aldolase, CBC, CMP, ESR, CRP today and with next rodrigo  - Continue Azathioprine 100mg daily, can consider going up to 150 mg if needed  - Although anti Ku does not carry a significant associated risk of malignancy- age related cancer screening encouraged. Pt declines Mammogram. Reports no prior colonoscopy but does annual FOBT. SPEP no monoclonal gammopathy  - Follow up with pulm Dr. Duncan for CT chest, PFTs and echocardio  - Referred to physical therapy for muscle strengthening   - Saw surgery for pancreas and recommended monitoring, repeat MR in 2 years   - Follow up with cardio for Afib, recent echo is normal    - Has had prevnar, shingles and covid vaccinations  - Vitamin D 2000 international units daily   - OP management deferred for now  - Consider IVIg if needed    RTC in 4 months     Plan, including risks and benefits, was discussed with the patient, informed on how to reach us.     To schedule an appointment, call [phone]  To reach the rheumatology office, call [phone]    Vikki Duckworth MD   Division of Rheumatology  [facility]

## 2024-09-09 ENCOUNTER — APPOINTMENT (OUTPATIENT)
Dept: RHEUMATOLOGY | Facility: CLINIC | Age: 74
End: 2024-09-09
Payer: COMMERCIAL

## 2024-09-09 LAB — ALDOLASE SERPL-CCNC: 8.5 U/L (ref 1.2–7.6)

## 2024-09-13 DIAGNOSIS — G72.49 IDIOPATHIC INFLAMMATORY MYOPATHY: Primary | ICD-10-CM

## 2024-09-13 RX ORDER — PREDNISONE 10 MG/1
20 TABLET ORAL DAILY
Qty: 180 TABLET | Refills: 1 | Status: SHIPPED | OUTPATIENT
Start: 2024-09-13 | End: 2025-03-12

## 2024-09-20 ENCOUNTER — LAB (OUTPATIENT)
Dept: LAB | Facility: LAB | Age: 74
End: 2024-09-20
Payer: MEDICARE

## 2024-09-20 DIAGNOSIS — M33.20 POLYMYOSITIS: ICD-10-CM

## 2024-09-20 LAB
ALBUMIN SERPL BCP-MCNC: 3.6 G/DL (ref 3.4–5)
ALP SERPL-CCNC: 33 U/L (ref 33–136)
ALT SERPL W P-5'-P-CCNC: 15 U/L (ref 7–45)
ANION GAP SERPL CALC-SCNC: 13 MMOL/L (ref 10–20)
AST SERPL W P-5'-P-CCNC: 35 U/L (ref 9–39)
BILIRUB SERPL-MCNC: 0.5 MG/DL (ref 0–1.2)
BUN SERPL-MCNC: 12 MG/DL (ref 6–23)
CALCIUM SERPL-MCNC: 8.8 MG/DL (ref 8.6–10.3)
CHLORIDE SERPL-SCNC: 104 MMOL/L (ref 98–107)
CK SERPL-CCNC: 241 U/L (ref 0–215)
CO2 SERPL-SCNC: 27 MMOL/L (ref 21–32)
CREAT SERPL-MCNC: 0.52 MG/DL (ref 0.5–1.05)
EGFRCR SERPLBLD CKD-EPI 2021: >90 ML/MIN/1.73M*2
GLUCOSE SERPL-MCNC: 91 MG/DL (ref 74–99)
POTASSIUM SERPL-SCNC: 4 MMOL/L (ref 3.5–5.3)
PROT SERPL-MCNC: 6.6 G/DL (ref 6.4–8.2)
SODIUM SERPL-SCNC: 140 MMOL/L (ref 136–145)

## 2024-09-20 PROCEDURE — 36415 COLL VENOUS BLD VENIPUNCTURE: CPT

## 2024-09-20 PROCEDURE — 82085 ASSAY OF ALDOLASE: CPT

## 2024-09-20 PROCEDURE — 80053 COMPREHEN METABOLIC PANEL: CPT

## 2024-09-20 PROCEDURE — 82550 ASSAY OF CK (CPK): CPT

## 2024-09-23 ENCOUNTER — APPOINTMENT (OUTPATIENT)
Dept: RHEUMATOLOGY | Facility: CLINIC | Age: 74
End: 2024-09-23
Payer: COMMERCIAL

## 2024-09-23 VITALS
TEMPERATURE: 97.8 F | DIASTOLIC BLOOD PRESSURE: 88 MMHG | HEART RATE: 85 BPM | BODY MASS INDEX: 32.06 KG/M2 | WEIGHT: 181 LBS | RESPIRATION RATE: 20 BRPM | SYSTOLIC BLOOD PRESSURE: 129 MMHG

## 2024-09-23 DIAGNOSIS — Z79.624 ENCOUNTER FOR LONG TERM CURRENT AZATHIOPRINE THERAPY: ICD-10-CM

## 2024-09-23 DIAGNOSIS — M15.9 PRIMARY OSTEOARTHRITIS INVOLVING MULTIPLE JOINTS: ICD-10-CM

## 2024-09-23 DIAGNOSIS — E55.9 VITAMIN D DEFICIENCY: ICD-10-CM

## 2024-09-23 DIAGNOSIS — M35.1 MCTD (MIXED CONNECTIVE TISSUE DISEASE) (MULTI): ICD-10-CM

## 2024-09-23 DIAGNOSIS — J84.9 ILD (INTERSTITIAL LUNG DISEASE) (MULTI): ICD-10-CM

## 2024-09-23 DIAGNOSIS — M81.6 LOCALIZED OSTEOPOROSIS WITHOUT CURRENT PATHOLOGICAL FRACTURE: ICD-10-CM

## 2024-09-23 DIAGNOSIS — G72.49 IDIOPATHIC INFLAMMATORY MYOPATHY: Primary | ICD-10-CM

## 2024-09-23 DIAGNOSIS — Z79.52 LONG TERM (CURRENT) USE OF SYSTEMIC STEROIDS: ICD-10-CM

## 2024-09-23 DIAGNOSIS — M33.20 POLYMYOSITIS: ICD-10-CM

## 2024-09-23 LAB — ALDOLASE SERPL-CCNC: 7.9 U/L (ref 1.2–7.6)

## 2024-09-23 PROCEDURE — 1157F ADVNC CARE PLAN IN RCRD: CPT | Performed by: STUDENT IN AN ORGANIZED HEALTH CARE EDUCATION/TRAINING PROGRAM

## 2024-09-23 PROCEDURE — 99215 OFFICE O/P EST HI 40 MIN: CPT | Performed by: STUDENT IN AN ORGANIZED HEALTH CARE EDUCATION/TRAINING PROGRAM

## 2024-09-23 PROCEDURE — 1160F RVW MEDS BY RX/DR IN RCRD: CPT | Performed by: STUDENT IN AN ORGANIZED HEALTH CARE EDUCATION/TRAINING PROGRAM

## 2024-09-23 PROCEDURE — 1159F MED LIST DOCD IN RCRD: CPT | Performed by: STUDENT IN AN ORGANIZED HEALTH CARE EDUCATION/TRAINING PROGRAM

## 2024-09-23 PROCEDURE — 1123F ACP DISCUSS/DSCN MKR DOCD: CPT | Performed by: STUDENT IN AN ORGANIZED HEALTH CARE EDUCATION/TRAINING PROGRAM

## 2024-09-23 RX ORDER — AZATHIOPRINE 50 MG/1
50 TABLET ORAL 2 TIMES DAILY
Qty: 180 TABLET | Refills: 1 | Status: SHIPPED | OUTPATIENT
Start: 2024-09-23 | End: 2025-03-22

## 2024-09-23 RX ORDER — PREDNISONE 5 MG/1
5 TABLET ORAL DAILY
Qty: 90 TABLET | Refills: 1 | Status: SHIPPED | OUTPATIENT
Start: 2024-09-23 | End: 2025-03-22

## 2024-09-23 RX ORDER — PREDNISONE 1 MG/1
4 TABLET ORAL DAILY
Qty: 360 TABLET | Refills: 1 | Status: SHIPPED | OUTPATIENT
Start: 2024-09-23 | End: 2025-03-22

## 2024-10-01 NOTE — PROGRESS NOTES
Virtual or Telephone Consent    An interactive audio and video telecommunication system which permits real time communications between the patient (at the originating site) and provider (at the distant site) was utilized to provide this telehealth service.   Verbal consent was requested and obtained from Nora Bynum on this date, 10/09/24 for a telehealth visit.      Subjective   Patient ID: [id number]   Nora Bynum is a 74 y.o. female with OA of right hip, inflammatory myopathy (anti-Ku positive) with MCTD, Afib (eliquis), and vitamin D deficiency, presenting for follow up.   Previously saw Dr. Jacome and Dr. Barbosa (last visit 5/30/23), Dr. Genao in 9/23, saw me once in 10/23, then followed up with Dr. Greco for insurance purposes, kept seeing Dr. Jacome in between and now back to me    Current IS:  - Prednisone 20 mg daily (went back up to in 8/24)  - Imuran 50mg BID, restarted in 8/24    Previous:  - MMF caused GI SE  - Myfortic, also causes GI SE, switched back to AZA    HPI  Has some questions about methotrexate and IVIg  Her most recent blood tests show that her CPK is trending up  241 -> 410, she had them done because she felt like her deltoid is a bit worse   She went off the AZA 2 days ago  Dr. Jacome suggested to go back to MMF, pt is refusing due to GI SE and reports no improvement with it   She did her research on both meds    From last visit:  She was seeing Dr. Greco and on Myfortic with prednisone, she was tapering down her steroids till 5 mg and had a flare of her myositis with elevated CPK, CRP and weakness as well as Afib, saw Dr. Jacome who increased her prednisone and recommended adding AZA, but pt stopped myfortic on her own due to GI upset, improved with increasing steroids  Saw pulm Dr. 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  Recommend:  PFT every 6 months  Could not tolerate myfortic  and switched back to imuran    Antifibrotic is not indicated currently  (Ofev)  Recommend annual echocardiography for pulm HTN  Saw cardio for afib, echo cardio with normal EF and no pulm HTN [facility]   Saw general surgery for pancreatic cysts, follow up imaging in 2 years  Patient doesn't want to be switched to methotrexate due to risk of pneumonitis with her ILD  She I also weary of the IVIg   No rashes, alopecia or photosensitivity  No oral or nasal ulcers  No episodes of eye inflammation  No chest pain, cough or dyspnea  No GI/ sx   No infections  Compliant with meds  No side effects reported    ROS  As per HPI     Rheum hx (Recall from Dr. Genao's notes):  Previously saw Dr. Jacome at [facility] since 2019. Nml CK, RF 16, ESR 47. Then saw Dr. Gomes and Elan (12/2019 GLADYS > 1:1280, negative JUSTIN, nml C3/C4, RF/CCP negative)   Sjogrens, SSA+ , Raynauds, dry mouth since 2019 June 2022, Prednisone 20mg daily 6/25/23 by Dr. Pierson for possible PMR (also hx of elevated CK up to 1411 previously), followed up with Dr. Jacome 4/2023: fatigue, muscle weakness in shoulder > hip, CK 1649, Raynauds - concern for inflammatory myopathy. EMG done by Dr. Thakur (Neurology) and recommended for muscle biopsy. She underwent MM bx 5/17/23 for Left deltoid and left rectus femoris. Pt was called by Dr. 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      She followed up with Alphonso Stanton on 5/30/23: On Pred 60mg daily and Azathioprine 150mg daily. Muscle pain improved, steorid taper by 10mg in 4 weeks, then 50mg X3 weeks, then 40mg X2 weeks. Continue Aza 150mg daily.      Pt's initial presenting sx were bilateral shoulder and leg stiffness, weakness, UE >LE. The arm weakness and  limitations in activity, difficulty doing overhead activity, started December 2022/ Jan 2023 with progressive weakness. Weakness in hip girdle.   90% improvement with steroids and AZA     + dry mouth, dry eyes, started in the more recent years  Denies fever, or current unintentional weight loss (reports some weight loss intentionally with intermittent fasting but overall has had ~100 lb weight loss over the last few years)  Denies hx of uveitis (gets eye exam annually)  Denies photosensitive rash  No hx of blood clots  Denies current difficulty swallowing.   Denies hx of seizure/CVA. + A fib (Dr. Pritchett/[facility] [place]) , has had Echo. On Dofetilide/Eliquis for A fib  Ct chest abd/pelvis was ordered for malignancy workup. She had CT chest [facility] at [facility]. CT abd/pelvis not done yet.      FMH of autoimmune disease: Unsure  PSH: Plans for hip replacement with Dr. Killian due to OA however due to inflammatory myopathy, unable to get this done currently.   SocHX: Never smoker.     Patient Active Problem List   Diagnosis    Abnormal TSH    Edema    Herpes zoster without complication    Mild vitamin D deficiency    Myalgia, unspecified site    Myopathy    Osteoarthritis of hip    Polyarthritis    Positive Lyme disease serology    Stiffness of joints of both hands    Arthritis of shoulder    MCTD (mixed connective tissue disease) (Multi)    Lung fibrosis (Multi)    Long term (current) use of systemic steroids    Long-term use of high-risk medication    Bilateral hand pain    Chronic pain of both shoulders    Decreased range of motion of right shoulder    Dyslipidemia    Elevated blood pressure reading without diagnosis of hypertension    HyperCKemia    Nuclear senile cataract of both eyes    Obesity, Class I, BMI 30-34.9    Paroxysmal atrial fibrillation (Multi)    Osteoarthritis of both knees    Perianal abscess    Pneumonia due to COVID-19 virus    Polyarthralgia    Bilateral hip pain    Systolic dysfunction    Thyroid  antibody positive    Weakness generalized    Vitamin D deficiency    Shoulder stiffness, right    Stiffness of right hand joint    Idiopathic inflammatory myopathy    Localized osteoporosis without current pathological fracture    Interstitial lung disease (Multi)     Past Medical History:   Diagnosis Date    Other specified health status     No pertinent past medical history     Past Surgical History:   Procedure Laterality Date    OTHER SURGICAL HISTORY  06/23/2020    Cyst excision     Social History     Socioeconomic History    Marital status: Single     Spouse name: Not on file    Number of children: Not on file    Years of education: Not on file    Highest education level: Not on file   Occupational History    Not on file   Tobacco Use    Smoking status: Never    Smokeless tobacco: Never   Substance and Sexual Activity    Alcohol use: Not Currently    Drug use: Never    Sexual activity: Defer   Other Topics Concern    Not on file   Social History Narrative    Not on file     Social Determinants of Health     Financial Resource Strain: Low Risk  (6/15/2023)    Received from [facility]    Overall Financial Resource Strain (CARDIA)     Difficulty of Paying Living Expenses: Not hard at all   Food Insecurity: No Food Insecurity (6/15/2023)    Received from [facility]    Hunger Vital Sign     Worried About Running Out of Food in the Last Year: Never true     Ran Out of Food in the Last Year: Never true   Transportation Needs: No Transportation Needs (6/15/2023)    Received from [facility]    PRAPARE - Transportation     Lack of Transportation (Medical): No     Lack of Transportation (Non-Medical): No   Physical Activity: Unknown (6/8/2022)    Received from [facility]    Exercise Vital Sign     Days of Exercise per Week: Patient declined     Minutes of Exercise per Session: Patient declined   Stress: No Stress Concern  Present (6/8/2022)    [facility], [facility]    [employer] [place] [employer] - Occupational Stress Questionnaire     Feeling of Stress : Not at all   Social Connections: Moderately Integrated (6/8/2022)    Received from [facility]    Social Connection and Isolation Panel [NHANES]     Frequency of Communication with Friends and Family: More than three times a week     Frequency of Social Gatherings with Friends and Family: Patient declined     Attends Jew Services: More than 4 times per year     Active Member of Clubs or Organizations: Yes     Attends Club or Organization Meetings: More than 4 times per year     Marital Status:    Intimate Partner Violence: Not on file   Housing Stability: Low Risk  (6/15/2023)    Received from [facility], [facility]    Housing Stability Vital Sign     Unable to Pay for Housing in the Last Year: No     Number of Places Lived in the Last Year: 1     Unstable Housing in the Last Year: No     No Known Allergies     Current Outpatient Medications:     azaTHIOprine (Imuran) 50 mg tablet, Take 1 tablet (50 mg) by mouth 2 times a day., Disp: 180 tablet, Rfl: 1    bisoprolol (Zebeta) 5 mg tablet, Take 1.5 tablets (7.5 mg) by mouth once daily at bedtime., Disp: , Rfl:     cholecalciferol (Vitamin D3) 50 mcg (2,000 unit) capsule, Take 1 capsule (50 mcg) by mouth once daily., Disp: , Rfl:     cyanocobalamin (Vitamin B-12) 1,000 mcg tablet, Take 1 tablet (1,000 mcg) by mouth once daily., Disp: , Rfl:     dofetilide (Tikosyn) 250 mcg capsule, Take 1 capsule (250 mcg) by mouth twice a day., Disp: , Rfl:     Eliquis 5 mg tablet, Take 1 tablet (5 mg) by mouth 2 times a day., Disp: , Rfl:     fish oil concentrate (Fish OiL) 120-180 mg capsule, Take by mouth., Disp: , Rfl:     magnesium 30 mg tablet, Take 1 tablet (30 mg) by mouth once daily., Disp: , Rfl:     melatonin 5 mg tablet, Take 1 tablet (5 mg) by mouth  once daily at bedtime., Disp: , Rfl:     predniSONE (Deltasone) 1 mg tablet, Take 4 tablets (4 mg) by mouth once daily., Disp: 360 tablet, Rfl: 1    predniSONE (Deltasone) 10 mg tablet, Take 2 tablets (20 mg) by mouth once daily., Disp: 180 tablet, Rfl: 1    predniSONE (Deltasone) 5 mg tablet, Take 1 tablet (5 mg) by mouth once daily., Disp: 90 tablet, Rfl: 1    vit B complex 100 combo no.2 (B-100 COMPLEX ORAL), Take 1 capsule by mouth once daily., Disp: , Rfl:      Objective   There were no vitals taken for this visit.  Virtual    Physical Exam (from last visit, this is virtual):  General: AAOx3, Cooperative  Head: normocephalic, atraumatic, no hair loss   Eyes: EOMI, conjunctiva clear, sclera white, anicteric, puffy lower eyelids   Neuro: CN II-XII grossly intact, no focal deficit, motor power 5/5 diffusely   Skin: No rashes, ulcers or photosensitive areas, bilateral LL varicose veins   MSK: Upper Extremities:  Hand/Fingers: No erythema, edema, tenderness or warmth at DIP, PIP, or MCP joints, FROM grossly. Good hand . No nodules. No deformities   Wrists: No erythema, edema, warmth or tenderness at wrist, FROM grossly  Elbows: No tenderness, edema, erythema or warmth at elbows, FROM grossly. No nodules   Shoulders: No edema, erythema, tenderness or warmth at shoulders. FROM  Lower Extremities:   Hips: No obvious deformities. No joint tenderness. No trochanteric bursae TTP  Knees: No tenderness, deformities, edema, rashes, or warmth, normal ROM grossly. No crepitus, no pes anserine bursa TTP   Ankles, feet: No deformities, tenderness, edema, erythema, ulceration, or warmth at the ankle or MTP/IP joints, normal ROM grossly  Spine: No spinal tenderness to palpation. No SI joint tenderness    Lab Results   Component Value Date    WBC 6.1 07/30/2024    HGB 13.8 07/30/2024    HCT 42.6 07/30/2024    MCV 94 07/30/2024     07/30/2024        Chemistry    Lab Results   Component Value Date/Time     09/20/2024  1025    K 4.0 09/20/2024 1025     09/20/2024 1025    CO2 27 09/20/2024 1025    BUN 12 09/20/2024 1025    CREATININE 0.52 09/20/2024 1025    Lab Results   Component Value Date/Time    CALCIUM 8.8 09/20/2024 1025    ALKPHOS 33 09/20/2024 1025    AST 35 09/20/2024 1025    ALT 15 09/20/2024 1025    BILITOT 0.5 09/20/2024 1025        Lab Results   Component Value Date    CRP 1.94 (H) 07/30/2024      Lab Results   Component Value Date    SEDRATE 27 07/30/2024      Lab Results   Component Value Date    HEPBCAB NONREACTIVE 08/14/2023    HEPCAB NONREACTIVE 08/14/2023      Lab Results   Component Value Date    ALT 15 09/20/2024    AST 35 09/20/2024    ALKPHOS 33 09/20/2024    BILITOT 0.5 09/20/2024         === 04/26/23 ===  XR SHOULDERS  Mild degenerative changes bilateral shoulders.      CT chest 2/24:  Mild pulmonary fibrotic changes without honeycombing or bronchiectasis  Small hiatal hernia with patulous distal esophagus  Pancreatic cystic lesion in the body may be further assessed with pancreas MRI    CT abd pelvis 7/23:  1. No abdominopelvic lymphadenopathy.  2. Bilateral subpleural predominant reticular and ground-glass opacities within the bilateral lung dependent regions, likely  secondary to atelectasis, an underlying component of fibrosis cannotbe excluded.  3. Patulous distal esophagus with layering fluid, most compatible with patient's history of Sjogren syndrome.  4. Wedge-shaped low-density defects within the medial aspect of the spleen, likely reflecting chronic splenic infarct.  5. There is an indeterminate 5 mm low-density lesion within the pancreatic body. Recommend further evaluation with MRI pancreas and  MRCP.    MRI abdomen 7/23 pertinent findings:  -Pancreas: Pancreas is normal in precontrast T1 signal intensity with no solid masses or main pancreatic duct dilatation.   -Multiple unilocular cystic pancreatic lesions communicate with the main pancreatic duct are noted for example a 1.3 cm cystic  structure in the   pancreatic body (3:37). 3 mm cystic lesion at the junction of the pancreatic body and tail (3:36). No worrisome imaging features.   -Liver: 1 cm right hepatic lobe cyst. No enhancing mass.   -Spleen: Wedge-shaped area of T2 hypointense signal in the spleen may be related to prior trauma.   -Kidneys: 8 mm thinly septated cyst interpolar right kidney.      Biopsy of left deltoid and rectus femoris muscle 5/23:  INFLAMMATORY MYOPATHY    Note: Histopathologic features are qualitatively similar within the biopsy  specimens, however, they are quantitatively more severe within the rectus  femoris muscle.  The biopsies demonstrate both endomysial and  perimysial/vascular inflammation composed predominantly of CD3 positive T cells, accompanied by muscle fiber necrosis with regeneration (highlighted with antibodies to P62, CD68, and CD56).  Trichrome staining demonstrates no ragged red fibers or rimmed vacuoles, however, it highlights perimysial fibrosis in the rectus femoris muscle.  Myosin heavy chain immunostains show no fiber type grouping.  Cytochrome oxidase staining is within normal limits, and Congo red stain for amyloid is negative.     DEXA 7/23:  Lumbar spine (L1-L4): 0.886 g/cm2, T-score -2.5  Right Femoral Neck: 0.765 g/cm2, T-score -2.0  Right Total Hip: 0.656 g/cm2, T-score -2.8  Left Femoral Neck: 0.659 g/cm2, T-score -2.7  Left Total Hip: 0.729 g/cm2, T-score -2.2      Assessment/Plan    This is a 74Y F with inflammatory myopathy, MCTD with ILD, OP, vitamin D def and OA of hip, presents for follow up on myositis  Last seen by me in 9/24, last seen by Dr. Greco in 4/24 and Dr. Jacome in 8/24     Labs:  10/24: AST 40, , aldolase 7.9, rest of CMP wnl  9/24: , AST 42, rest of CMP wnl  8/24: AST 40, rest of CMP, CPK, aldolase wnl  7/24: ALC 0.5, AST 42, CRP 1.9, , aldolase, ESR, rest of CBC, CMP wnl  4/24: ESR, CRP, CPK, aldolase, CBC, CMP wnl  1/24: ESR, CRP, CPK, aldolase,  vitamin D, CBC, CMP wnl  12/23: CRP 1.8, ALC 0.5, rest of CBC, CMP, CPK, aldolase, ESR, C3, C4 wnl  11/23: CPK, aldolase, ESR, CRP wnl  10/23: LFTs, CPK, aldolase, CBC, CMP, ESR, CRP wnl  6/23 CK 1014 --> 435 --> 349 --> 141 --> 78 (9/23)   Aldolase 22.3 --> 8.7 --> 5.0 (9/23)  5/23: ESR, CRP wnl  4/23: SPEP no definitive M protein. Aldolase 27.3, CK 1649  GLADYS 1:2560, chrom >8  4/23: TPMT 22.4, Tspot TB negative. HBsAg Negative  Extended myositis panel shows + anti- Ku       # Inflammatory myopathy, Extended myositis panel +Ku, MM biopsy CK and aldolase trending down, sx recurring with tapering of pred to 16.5mg  Extensive discussion with the patient about her disease and treatment options  Will stop Imuran (uptredning CPK after 3 months of tx), pt would like to start methotrexate although she is worried about the risk of methotrexate induced pneumonitis  All of her questions were answered   # MCTD: inflammatory myopathy, Sjogrens (SSA+, GLADYS +, sicca symptoms), Chr + , Raynauds  # Long term systemic steroids  # OP: Repeat D[facility]A done at [facility] (7/[facility]- reviewed results- pt has osteoporosis worse T score -2.8, on vitamin D and calcium, does not want to add therapies now, would like to have better nutrition first, she was losing weight before and couldn't take her vitamins - briefly discussed BP     - Start methotrexate 12.5 mg weekly plus daily FA   - Close follow up for any pulm sx   - Make Prednisone 25 mg daily   - Labs on f/u: CK, aldolase, CBC, CMP, ESR, CRP today and with next rodrigo  - Discontinue Azathioprine   - Although anti Ku does not carry a significant associated risk of malignancy- age related cancer screening encouraged. Pt declines Mammogram. Reports no prior colonoscopy but does annual FOBT. SPEP no monoclonal gammopathy  - Follow up with pulm Dr. Duncan for CT chest, PFTs and echocardio  - Referred to physical therapy for muscle strengthening   - Saw surgery for pancreas and recommended monitoring,  repeat MR in 2 years   - Follow up with cardio for Afib, recent echo is normal    - Has had prevnar, shingles and covid vaccinations  - Vitamin D 2000 international units daily   - OP management deferred for now  - Consider IVIg if needed, pt would like to defer it for now     RTC in 4 months     Plan, including risks and benefits, was discussed with the patient, informed on how to reach us.     To schedule an appointment, call [phone]  To reach the rheumatology office, call [phone]    Vikki Duckworth MD   Division of Rheumatology  [facility]

## 2024-10-04 ENCOUNTER — LAB (OUTPATIENT)
Dept: LAB | Facility: LAB | Age: 74
End: 2024-10-04
Payer: MEDICARE

## 2024-10-04 DIAGNOSIS — M33.20 POLYMYOSITIS: ICD-10-CM

## 2024-10-04 LAB
ALBUMIN SERPL BCP-MCNC: 3.8 G/DL (ref 3.4–5)
ALP SERPL-CCNC: 33 U/L (ref 33–136)
ALT SERPL W P-5'-P-CCNC: 17 U/L (ref 7–45)
ANION GAP SERPL CALC-SCNC: 11 MMOL/L (ref 10–20)
AST SERPL W P-5'-P-CCNC: 40 U/L (ref 9–39)
BILIRUB SERPL-MCNC: 0.5 MG/DL (ref 0–1.2)
BUN SERPL-MCNC: 23 MG/DL (ref 6–23)
CALCIUM SERPL-MCNC: 9.3 MG/DL (ref 8.6–10.3)
CHLORIDE SERPL-SCNC: 105 MMOL/L (ref 98–107)
CK SERPL-CCNC: 410 U/L (ref 0–215)
CO2 SERPL-SCNC: 30 MMOL/L (ref 21–32)
CREAT SERPL-MCNC: 0.6 MG/DL (ref 0.5–1.05)
EGFRCR SERPLBLD CKD-EPI 2021: >90 ML/MIN/1.73M*2
GLUCOSE SERPL-MCNC: 88 MG/DL (ref 74–99)
POTASSIUM SERPL-SCNC: 4.4 MMOL/L (ref 3.5–5.3)
PROT SERPL-MCNC: 6.7 G/DL (ref 6.4–8.2)
SODIUM SERPL-SCNC: 142 MMOL/L (ref 136–145)

## 2024-10-04 PROCEDURE — 82085 ASSAY OF ALDOLASE: CPT

## 2024-10-04 PROCEDURE — 80053 COMPREHEN METABOLIC PANEL: CPT

## 2024-10-04 PROCEDURE — 36415 COLL VENOUS BLD VENIPUNCTURE: CPT

## 2024-10-04 PROCEDURE — 82550 ASSAY OF CK (CPK): CPT

## 2024-10-07 LAB — ALDOLASE SERPL-CCNC: 7.9 U/L (ref 1.2–7.6)

## 2024-10-09 ENCOUNTER — APPOINTMENT (OUTPATIENT)
Dept: RHEUMATOLOGY | Facility: CLINIC | Age: 74
End: 2024-10-09
Payer: MEDICARE

## 2024-10-09 DIAGNOSIS — Z79.624 ENCOUNTER FOR LONG TERM CURRENT AZATHIOPRINE THERAPY: ICD-10-CM

## 2024-10-09 DIAGNOSIS — E55.9 VITAMIN D DEFICIENCY: ICD-10-CM

## 2024-10-09 DIAGNOSIS — M35.1 MCTD (MIXED CONNECTIVE TISSUE DISEASE) (MULTI): ICD-10-CM

## 2024-10-09 DIAGNOSIS — G72.49 IDIOPATHIC INFLAMMATORY MYOPATHY: Primary | ICD-10-CM

## 2024-10-09 DIAGNOSIS — J84.9 ILD (INTERSTITIAL LUNG DISEASE) (MULTI): ICD-10-CM

## 2024-10-09 DIAGNOSIS — M81.6 LOCALIZED OSTEOPOROSIS WITHOUT CURRENT PATHOLOGICAL FRACTURE: ICD-10-CM

## 2024-10-09 DIAGNOSIS — Z79.52 LONG TERM (CURRENT) USE OF SYSTEMIC STEROIDS: ICD-10-CM

## 2024-10-09 DIAGNOSIS — M15.0 PRIMARY OSTEOARTHRITIS INVOLVING MULTIPLE JOINTS: ICD-10-CM

## 2024-10-09 DIAGNOSIS — Z79.899 LONG-TERM USE OF HIGH-RISK MEDICATION: ICD-10-CM

## 2024-10-09 DIAGNOSIS — M16.11 PRIMARY OSTEOARTHRITIS OF RIGHT HIP: ICD-10-CM

## 2024-10-09 PROCEDURE — 1160F RVW MEDS BY RX/DR IN RCRD: CPT | Performed by: STUDENT IN AN ORGANIZED HEALTH CARE EDUCATION/TRAINING PROGRAM

## 2024-10-09 PROCEDURE — 99215 OFFICE O/P EST HI 40 MIN: CPT | Performed by: STUDENT IN AN ORGANIZED HEALTH CARE EDUCATION/TRAINING PROGRAM

## 2024-10-09 PROCEDURE — 1157F ADVNC CARE PLAN IN RCRD: CPT | Performed by: STUDENT IN AN ORGANIZED HEALTH CARE EDUCATION/TRAINING PROGRAM

## 2024-10-09 PROCEDURE — 1159F MED LIST DOCD IN RCRD: CPT | Performed by: STUDENT IN AN ORGANIZED HEALTH CARE EDUCATION/TRAINING PROGRAM

## 2024-10-09 PROCEDURE — 1123F ACP DISCUSS/DSCN MKR DOCD: CPT | Performed by: STUDENT IN AN ORGANIZED HEALTH CARE EDUCATION/TRAINING PROGRAM

## 2024-10-09 RX ORDER — PREDNISONE 5 MG/1
5 TABLET ORAL DAILY
Qty: 90 TABLET | Refills: 1 | Status: SHIPPED | OUTPATIENT
Start: 2024-10-09 | End: 2025-04-07

## 2024-10-09 RX ORDER — METHOTREXATE 2.5 MG/1
12.5 TABLET ORAL WEEKLY
Qty: 60 TABLET | Refills: 1 | Status: SHIPPED | OUTPATIENT
Start: 2024-10-09 | End: 2025-01-07

## 2024-10-09 RX ORDER — FOLIC ACID 1 MG/1
1 TABLET ORAL DAILY
Qty: 90 TABLET | Refills: 1 | Status: SHIPPED | OUTPATIENT
Start: 2024-10-09 | End: 2025-04-07

## 2024-10-18 ENCOUNTER — LAB (OUTPATIENT)
Dept: LAB | Facility: LAB | Age: 74
End: 2024-10-18
Payer: MEDICARE

## 2024-10-18 DIAGNOSIS — M33.20 POLYMYOSITIS: ICD-10-CM

## 2024-10-18 LAB
ALBUMIN SERPL BCP-MCNC: 3.8 G/DL (ref 3.4–5)
ALP SERPL-CCNC: 35 U/L (ref 33–136)
ALT SERPL W P-5'-P-CCNC: 28 U/L (ref 7–45)
ANION GAP SERPL CALC-SCNC: 10 MMOL/L (ref 10–20)
AST SERPL W P-5'-P-CCNC: 46 U/L (ref 9–39)
BILIRUB SERPL-MCNC: 0.4 MG/DL (ref 0–1.2)
BUN SERPL-MCNC: 16 MG/DL (ref 6–23)
CALCIUM SERPL-MCNC: 9 MG/DL (ref 8.6–10.3)
CHLORIDE SERPL-SCNC: 105 MMOL/L (ref 98–107)
CK SERPL-CCNC: 408 U/L (ref 0–215)
CO2 SERPL-SCNC: 29 MMOL/L (ref 21–32)
CREAT SERPL-MCNC: 0.54 MG/DL (ref 0.5–1.05)
EGFRCR SERPLBLD CKD-EPI 2021: >90 ML/MIN/1.73M*2
GLUCOSE SERPL-MCNC: 87 MG/DL (ref 74–99)
POTASSIUM SERPL-SCNC: 3.9 MMOL/L (ref 3.5–5.3)
PROT SERPL-MCNC: 6.6 G/DL (ref 6.4–8.2)
SODIUM SERPL-SCNC: 140 MMOL/L (ref 136–145)

## 2024-10-18 PROCEDURE — 82085 ASSAY OF ALDOLASE: CPT

## 2024-10-18 PROCEDURE — 36415 COLL VENOUS BLD VENIPUNCTURE: CPT

## 2024-10-18 PROCEDURE — 80053 COMPREHEN METABOLIC PANEL: CPT

## 2024-10-18 PROCEDURE — 82550 ASSAY OF CK (CPK): CPT

## 2024-10-21 LAB — ALDOLASE SERPL-CCNC: 9.4 U/L (ref 1.2–7.6)

## 2024-11-01 ENCOUNTER — LAB (OUTPATIENT)
Dept: LAB | Facility: LAB | Age: 74
End: 2024-11-01
Payer: MEDICARE

## 2024-11-01 DIAGNOSIS — M33.20 POLYMYOSITIS: ICD-10-CM

## 2024-11-01 LAB
ALBUMIN SERPL BCP-MCNC: 4 G/DL (ref 3.4–5)
ALP SERPL-CCNC: 36 U/L (ref 33–136)
ALT SERPL W P-5'-P-CCNC: 22 U/L (ref 7–45)
ANION GAP SERPL CALC-SCNC: 14 MMOL/L (ref 10–20)
AST SERPL W P-5'-P-CCNC: 32 U/L (ref 9–39)
BILIRUB SERPL-MCNC: 0.5 MG/DL (ref 0–1.2)
BUN SERPL-MCNC: 23 MG/DL (ref 6–23)
CALCIUM SERPL-MCNC: 9.4 MG/DL (ref 8.6–10.6)
CHLORIDE SERPL-SCNC: 104 MMOL/L (ref 98–107)
CK SERPL-CCNC: 338 U/L (ref 0–215)
CO2 SERPL-SCNC: 28 MMOL/L (ref 21–32)
CREAT SERPL-MCNC: 0.66 MG/DL (ref 0.5–1.05)
EGFRCR SERPLBLD CKD-EPI 2021: >90 ML/MIN/1.73M*2
GLUCOSE SERPL-MCNC: 87 MG/DL (ref 74–99)
POTASSIUM SERPL-SCNC: 4.2 MMOL/L (ref 3.5–5.3)
PROT SERPL-MCNC: 6.9 G/DL (ref 6.4–8.2)
SODIUM SERPL-SCNC: 142 MMOL/L (ref 136–145)

## 2024-11-01 PROCEDURE — 82085 ASSAY OF ALDOLASE: CPT

## 2024-11-01 PROCEDURE — 80053 COMPREHEN METABOLIC PANEL: CPT

## 2024-11-01 PROCEDURE — 36415 COLL VENOUS BLD VENIPUNCTURE: CPT

## 2024-11-01 PROCEDURE — 82550 ASSAY OF CK (CPK): CPT

## 2024-11-03 LAB — ALDOLASE SERPL-CCNC: 6.3 U/L (ref 1.2–7.6)

## 2024-11-15 ENCOUNTER — LAB (OUTPATIENT)
Dept: LAB | Facility: LAB | Age: 74
End: 2024-11-15
Payer: MEDICARE

## 2024-11-15 DIAGNOSIS — M33.20 POLYMYOSITIS: ICD-10-CM

## 2024-11-15 PROCEDURE — 82085 ASSAY OF ALDOLASE: CPT

## 2024-11-15 PROCEDURE — 82550 ASSAY OF CK (CPK): CPT

## 2024-11-15 PROCEDURE — 36415 COLL VENOUS BLD VENIPUNCTURE: CPT

## 2024-11-15 PROCEDURE — 80053 COMPREHEN METABOLIC PANEL: CPT

## 2024-11-16 LAB
ALBUMIN SERPL BCP-MCNC: 4.2 G/DL (ref 3.4–5)
ALP SERPL-CCNC: 37 U/L (ref 33–136)
ALT SERPL W P-5'-P-CCNC: 21 U/L (ref 7–45)
ANION GAP SERPL CALC-SCNC: 14 MMOL/L (ref 10–20)
AST SERPL W P-5'-P-CCNC: 33 U/L (ref 9–39)
BILIRUB SERPL-MCNC: 0.5 MG/DL (ref 0–1.2)
BUN SERPL-MCNC: 15 MG/DL (ref 6–23)
CALCIUM SERPL-MCNC: 9.1 MG/DL (ref 8.6–10.6)
CHLORIDE SERPL-SCNC: 104 MMOL/L (ref 98–107)
CK SERPL-CCNC: 324 U/L (ref 0–215)
CO2 SERPL-SCNC: 26 MMOL/L (ref 21–32)
CREAT SERPL-MCNC: 0.55 MG/DL (ref 0.5–1.05)
EGFRCR SERPLBLD CKD-EPI 2021: >90 ML/MIN/1.73M*2
GLUCOSE SERPL-MCNC: 89 MG/DL (ref 74–99)
POTASSIUM SERPL-SCNC: 4.3 MMOL/L (ref 3.5–5.3)
PROT SERPL-MCNC: 7.1 G/DL (ref 6.4–8.2)
SODIUM SERPL-SCNC: 140 MMOL/L (ref 136–145)

## 2024-11-18 LAB — ALDOLASE SERPL-CCNC: 5.1 U/L (ref 1.2–7.6)

## 2024-12-02 ENCOUNTER — LAB (OUTPATIENT)
Dept: LAB | Facility: LAB | Age: 74
End: 2024-12-02
Payer: MEDICARE

## 2024-12-02 DIAGNOSIS — J84.9 ILD (INTERSTITIAL LUNG DISEASE) (MULTI): ICD-10-CM

## 2024-12-02 DIAGNOSIS — M35.1 MCTD (MIXED CONNECTIVE TISSUE DISEASE) (MULTI): ICD-10-CM

## 2024-12-02 DIAGNOSIS — G72.49 IDIOPATHIC INFLAMMATORY MYOPATHY: ICD-10-CM

## 2024-12-02 DIAGNOSIS — E55.9 VITAMIN D DEFICIENCY: ICD-10-CM

## 2024-12-02 DIAGNOSIS — M81.6 LOCALIZED OSTEOPOROSIS WITHOUT CURRENT PATHOLOGICAL FRACTURE: ICD-10-CM

## 2024-12-02 DIAGNOSIS — M33.20 POLYMYOSITIS: ICD-10-CM

## 2024-12-02 DIAGNOSIS — M15.0 PRIMARY OSTEOARTHRITIS INVOLVING MULTIPLE JOINTS: ICD-10-CM

## 2024-12-02 DIAGNOSIS — Z79.52 LONG TERM (CURRENT) USE OF SYSTEMIC STEROIDS: ICD-10-CM

## 2024-12-02 DIAGNOSIS — Z79.624 ENCOUNTER FOR LONG TERM CURRENT AZATHIOPRINE THERAPY: ICD-10-CM

## 2024-12-02 LAB
ALBUMIN SERPL BCP-MCNC: 4 G/DL (ref 3.4–5)
ALP SERPL-CCNC: 41 U/L (ref 33–136)
ALT SERPL W P-5'-P-CCNC: 17 U/L (ref 7–45)
ANION GAP SERPL CALC-SCNC: 9 MMOL/L (ref 10–20)
AST SERPL W P-5'-P-CCNC: 23 U/L (ref 9–39)
BASOPHILS # BLD AUTO: 0.02 X10*3/UL (ref 0–0.1)
BASOPHILS NFR BLD AUTO: 0.2 %
BILIRUB SERPL-MCNC: 0.4 MG/DL (ref 0–1.2)
BUN SERPL-MCNC: 12 MG/DL (ref 6–23)
CALCIUM SERPL-MCNC: 9.4 MG/DL (ref 8.6–10.6)
CHLORIDE SERPL-SCNC: 104 MMOL/L (ref 98–107)
CK SERPL-CCNC: 274 U/L (ref 0–215)
CO2 SERPL-SCNC: 33 MMOL/L (ref 21–32)
CREAT SERPL-MCNC: 0.55 MG/DL (ref 0.5–1.05)
CRP SERPL-MCNC: 0.25 MG/DL
EGFRCR SERPLBLD CKD-EPI 2021: >90 ML/MIN/1.73M*2
EOSINOPHIL # BLD AUTO: 0.04 X10*3/UL (ref 0–0.4)
EOSINOPHIL NFR BLD AUTO: 0.4 %
ERYTHROCYTE [DISTWIDTH] IN BLOOD BY AUTOMATED COUNT: 15.9 % (ref 11.5–14.5)
ERYTHROCYTE [SEDIMENTATION RATE] IN BLOOD BY WESTERGREN METHOD: 5 MM/H (ref 0–30)
GLUCOSE SERPL-MCNC: 101 MG/DL (ref 74–99)
HCT VFR BLD AUTO: 41.7 % (ref 36–46)
HGB BLD-MCNC: 13.5 G/DL (ref 12–16)
IMM GRANULOCYTES # BLD AUTO: 0.04 X10*3/UL (ref 0–0.5)
IMM GRANULOCYTES NFR BLD AUTO: 0.4 % (ref 0–0.9)
LYMPHOCYTES # BLD AUTO: 0.78 X10*3/UL (ref 0.8–3)
LYMPHOCYTES NFR BLD AUTO: 8.1 %
MCH RBC QN AUTO: 31.3 PG (ref 26–34)
MCHC RBC AUTO-ENTMCNC: 32.4 G/DL (ref 32–36)
MCV RBC AUTO: 97 FL (ref 80–100)
MONOCYTES # BLD AUTO: 0.33 X10*3/UL (ref 0.05–0.8)
MONOCYTES NFR BLD AUTO: 3.4 %
NEUTROPHILS # BLD AUTO: 8.43 X10*3/UL (ref 1.6–5.5)
NEUTROPHILS NFR BLD AUTO: 87.5 %
NRBC BLD-RTO: 0 /100 WBCS (ref 0–0)
PLATELET # BLD AUTO: 282 X10*3/UL (ref 150–450)
POTASSIUM SERPL-SCNC: 4 MMOL/L (ref 3.5–5.3)
PROT SERPL-MCNC: 6.9 G/DL (ref 6.4–8.2)
RBC # BLD AUTO: 4.31 X10*6/UL (ref 4–5.2)
SODIUM SERPL-SCNC: 142 MMOL/L (ref 136–145)
WBC # BLD AUTO: 9.6 X10*3/UL (ref 4.4–11.3)

## 2024-12-02 PROCEDURE — 80053 COMPREHEN METABOLIC PANEL: CPT

## 2024-12-02 PROCEDURE — 82085 ASSAY OF ALDOLASE: CPT

## 2024-12-02 PROCEDURE — 36415 COLL VENOUS BLD VENIPUNCTURE: CPT

## 2024-12-02 PROCEDURE — 86140 C-REACTIVE PROTEIN: CPT

## 2024-12-02 PROCEDURE — 82550 ASSAY OF CK (CPK): CPT

## 2024-12-02 PROCEDURE — 85652 RBC SED RATE AUTOMATED: CPT

## 2024-12-02 PROCEDURE — 85025 COMPLETE CBC W/AUTO DIFF WBC: CPT

## 2024-12-03 DIAGNOSIS — G72.49 IDIOPATHIC INFLAMMATORY MYOPATHY: Primary | ICD-10-CM

## 2024-12-03 LAB — ALDOLASE SERPL-CCNC: 4.9 U/L (ref 1.2–7.6)

## 2024-12-18 ENCOUNTER — LAB (OUTPATIENT)
Dept: LAB | Facility: LAB | Age: 74
End: 2024-12-18
Payer: MEDICARE

## 2024-12-18 DIAGNOSIS — M15.0 PRIMARY OSTEOARTHRITIS INVOLVING MULTIPLE JOINTS: ICD-10-CM

## 2024-12-18 DIAGNOSIS — M35.1 MCTD (MIXED CONNECTIVE TISSUE DISEASE) (MULTI): ICD-10-CM

## 2024-12-18 DIAGNOSIS — E55.9 VITAMIN D DEFICIENCY: ICD-10-CM

## 2024-12-18 DIAGNOSIS — M81.6 LOCALIZED OSTEOPOROSIS WITHOUT CURRENT PATHOLOGICAL FRACTURE: ICD-10-CM

## 2024-12-18 DIAGNOSIS — Z79.624 ENCOUNTER FOR LONG TERM CURRENT AZATHIOPRINE THERAPY: ICD-10-CM

## 2024-12-18 DIAGNOSIS — G72.49 IDIOPATHIC INFLAMMATORY MYOPATHY: ICD-10-CM

## 2024-12-18 DIAGNOSIS — M33.20 POLYMYOSITIS: ICD-10-CM

## 2024-12-18 DIAGNOSIS — Z79.52 LONG TERM (CURRENT) USE OF SYSTEMIC STEROIDS: ICD-10-CM

## 2024-12-18 DIAGNOSIS — J84.9 ILD (INTERSTITIAL LUNG DISEASE) (MULTI): ICD-10-CM

## 2024-12-18 LAB
ALBUMIN SERPL BCP-MCNC: 4.2 G/DL (ref 3.4–5)
ALP SERPL-CCNC: 34 U/L (ref 33–136)
ALT SERPL W P-5'-P-CCNC: 16 U/L (ref 7–45)
ANION GAP SERPL CALC-SCNC: 15 MMOL/L (ref 10–20)
AST SERPL W P-5'-P-CCNC: 22 U/L (ref 9–39)
BASOPHILS # BLD AUTO: 0.02 X10*3/UL (ref 0–0.1)
BASOPHILS NFR BLD AUTO: 0.2 %
BILIRUB SERPL-MCNC: 0.4 MG/DL (ref 0–1.2)
BUN SERPL-MCNC: 16 MG/DL (ref 6–23)
CALCIUM SERPL-MCNC: 9.8 MG/DL (ref 8.6–10.6)
CHLORIDE SERPL-SCNC: 104 MMOL/L (ref 98–107)
CK SERPL-CCNC: 160 U/L (ref 0–215)
CO2 SERPL-SCNC: 27 MMOL/L (ref 21–32)
CREAT SERPL-MCNC: 0.63 MG/DL (ref 0.5–1.05)
CRP SERPL-MCNC: 0.19 MG/DL
EGFRCR SERPLBLD CKD-EPI 2021: >90 ML/MIN/1.73M*2
EOSINOPHIL # BLD AUTO: 0.04 X10*3/UL (ref 0–0.4)
EOSINOPHIL NFR BLD AUTO: 0.5 %
ERYTHROCYTE [DISTWIDTH] IN BLOOD BY AUTOMATED COUNT: 16.2 % (ref 11.5–14.5)
ERYTHROCYTE [SEDIMENTATION RATE] IN BLOOD BY WESTERGREN METHOD: 9 MM/H (ref 0–30)
GLUCOSE SERPL-MCNC: 103 MG/DL (ref 74–99)
HCT VFR BLD AUTO: 46.2 % (ref 36–46)
HGB BLD-MCNC: 14.3 G/DL (ref 12–16)
IMM GRANULOCYTES # BLD AUTO: 0.03 X10*3/UL (ref 0–0.5)
IMM GRANULOCYTES NFR BLD AUTO: 0.3 % (ref 0–0.9)
LYMPHOCYTES # BLD AUTO: 0.88 X10*3/UL (ref 0.8–3)
LYMPHOCYTES NFR BLD AUTO: 9.9 %
MCH RBC QN AUTO: 30.7 PG (ref 26–34)
MCHC RBC AUTO-ENTMCNC: 31 G/DL (ref 32–36)
MCV RBC AUTO: 99 FL (ref 80–100)
MONOCYTES # BLD AUTO: 0.41 X10*3/UL (ref 0.05–0.8)
MONOCYTES NFR BLD AUTO: 4.6 %
NEUTROPHILS # BLD AUTO: 7.5 X10*3/UL (ref 1.6–5.5)
NEUTROPHILS NFR BLD AUTO: 84.5 %
NRBC BLD-RTO: 0 /100 WBCS (ref 0–0)
PLATELET # BLD AUTO: 253 X10*3/UL (ref 150–450)
POTASSIUM SERPL-SCNC: 4.3 MMOL/L (ref 3.5–5.3)
PROT SERPL-MCNC: 7 G/DL (ref 6.4–8.2)
RBC # BLD AUTO: 4.66 X10*6/UL (ref 4–5.2)
SODIUM SERPL-SCNC: 142 MMOL/L (ref 136–145)
WBC # BLD AUTO: 8.9 X10*3/UL (ref 4.4–11.3)

## 2024-12-18 PROCEDURE — 82085 ASSAY OF ALDOLASE: CPT

## 2024-12-18 PROCEDURE — 86140 C-REACTIVE PROTEIN: CPT

## 2024-12-18 PROCEDURE — 36415 COLL VENOUS BLD VENIPUNCTURE: CPT

## 2024-12-18 PROCEDURE — 82550 ASSAY OF CK (CPK): CPT

## 2024-12-18 PROCEDURE — 80053 COMPREHEN METABOLIC PANEL: CPT

## 2024-12-18 PROCEDURE — 85652 RBC SED RATE AUTOMATED: CPT

## 2024-12-18 PROCEDURE — 85025 COMPLETE CBC W/AUTO DIFF WBC: CPT

## 2024-12-20 LAB — ALDOLASE SERPL-CCNC: 4.7 U/L (ref 1.2–7.6)

## 2024-12-26 ENCOUNTER — APPOINTMENT (OUTPATIENT)
Dept: NEUROLOGY | Facility: CLINIC | Age: 74
End: 2024-12-26
Payer: COMMERCIAL

## 2025-01-07 ENCOUNTER — LAB (OUTPATIENT)
Dept: LAB | Facility: LAB | Age: 75
End: 2025-01-07
Payer: MEDICARE

## 2025-01-07 ENCOUNTER — APPOINTMENT (OUTPATIENT)
Facility: CLINIC | Age: 75
End: 2025-01-07
Payer: MEDICARE

## 2025-01-07 DIAGNOSIS — G72.49 IDIOPATHIC INFLAMMATORY MYOPATHY: ICD-10-CM

## 2025-01-07 LAB — CK SERPL-CCNC: 195 U/L (ref 0–215)

## 2025-01-07 PROCEDURE — 82550 ASSAY OF CK (CPK): CPT

## 2025-01-22 PROBLEM — Z86.79 HISTORY OF ATRIAL FIBRILLATION: Status: ACTIVE | Noted: 2025-01-22

## 2025-01-22 PROBLEM — I42.8 OTHER CARDIOMYOPATHIES: Status: ACTIVE | Noted: 2024-06-19

## 2025-01-22 PROBLEM — M16.10 PRIMARY LOCALIZED OSTEOARTHRITIS OF PELVIC REGION AND THIGH: Status: ACTIVE | Noted: 2024-11-06

## 2025-01-23 ENCOUNTER — LAB (OUTPATIENT)
Dept: LAB | Facility: LAB | Age: 75
End: 2025-01-23
Payer: MEDICARE

## 2025-01-23 ENCOUNTER — APPOINTMENT (OUTPATIENT)
Dept: NEUROLOGY | Facility: CLINIC | Age: 75
End: 2025-01-23
Payer: COMMERCIAL

## 2025-01-23 VITALS
SYSTOLIC BLOOD PRESSURE: 126 MMHG | TEMPERATURE: 97.1 F | HEIGHT: 63 IN | DIASTOLIC BLOOD PRESSURE: 84 MMHG | BODY MASS INDEX: 34.55 KG/M2 | WEIGHT: 195 LBS

## 2025-01-23 DIAGNOSIS — M35.1 MCTD (MIXED CONNECTIVE TISSUE DISEASE) (MULTI): ICD-10-CM

## 2025-01-23 DIAGNOSIS — G72.9 MYOPATHY: Primary | ICD-10-CM

## 2025-01-23 DIAGNOSIS — Z79.624 ENCOUNTER FOR LONG TERM CURRENT AZATHIOPRINE THERAPY: ICD-10-CM

## 2025-01-23 DIAGNOSIS — M33.20 POLYMYOSITIS: ICD-10-CM

## 2025-01-23 DIAGNOSIS — G72.49 IDIOPATHIC INFLAMMATORY MYOPATHY: ICD-10-CM

## 2025-01-23 DIAGNOSIS — M15.0 PRIMARY OSTEOARTHRITIS INVOLVING MULTIPLE JOINTS: ICD-10-CM

## 2025-01-23 DIAGNOSIS — J84.9 ILD (INTERSTITIAL LUNG DISEASE) (MULTI): ICD-10-CM

## 2025-01-23 DIAGNOSIS — G72.9 MYOPATHY: ICD-10-CM

## 2025-01-23 DIAGNOSIS — E55.9 VITAMIN D DEFICIENCY: ICD-10-CM

## 2025-01-23 DIAGNOSIS — Z79.52 LONG TERM (CURRENT) USE OF SYSTEMIC STEROIDS: ICD-10-CM

## 2025-01-23 DIAGNOSIS — M81.6 LOCALIZED OSTEOPOROSIS WITHOUT CURRENT PATHOLOGICAL FRACTURE: ICD-10-CM

## 2025-01-23 PROCEDURE — 1123F ACP DISCUSS/DSCN MKR DOCD: CPT | Performed by: PSYCHIATRY & NEUROLOGY

## 2025-01-23 PROCEDURE — 99214 OFFICE O/P EST MOD 30 MIN: CPT | Performed by: PSYCHIATRY & NEUROLOGY

## 2025-01-23 PROCEDURE — 82085 ASSAY OF ALDOLASE: CPT

## 2025-01-23 PROCEDURE — 1159F MED LIST DOCD IN RCRD: CPT | Performed by: PSYCHIATRY & NEUROLOGY

## 2025-01-23 PROCEDURE — 85025 COMPLETE CBC W/AUTO DIFF WBC: CPT

## 2025-01-23 PROCEDURE — 1157F ADVNC CARE PLAN IN RCRD: CPT | Performed by: PSYCHIATRY & NEUROLOGY

## 2025-01-23 PROCEDURE — 86140 C-REACTIVE PROTEIN: CPT

## 2025-01-23 PROCEDURE — 85652 RBC SED RATE AUTOMATED: CPT

## 2025-01-23 PROCEDURE — 80053 COMPREHEN METABOLIC PANEL: CPT

## 2025-01-23 PROCEDURE — 36415 COLL VENOUS BLD VENIPUNCTURE: CPT

## 2025-01-23 PROCEDURE — 82550 ASSAY OF CK (CPK): CPT

## 2025-01-23 ASSESSMENT — ENCOUNTER SYMPTOMS: WEAKNESS: 1

## 2025-01-23 NOTE — PROGRESS NOTES
"Subjective   Nora Bynum is a 74 y.o. female who comes in for follow up of myositis.    She is on methotrexate now.  It helped right away.  She was having difficulty tapering done the prednisone.   She lower it to 12 mg and could barely walk.  She went back up to 13 mg.  She wants to repeat the CK then consider going back to 12.5.     Review of Systems   Neurological:  Positive for weakness.       Objective   /84 (BP Location: Right arm, Patient Position: Sitting, BP Cuff Size: Large adult long)   Temp 36.2 °C (97.1 °F) (Temporal)   Ht 1.6 m (5' 3\")   Wt 88.5 kg (195 lb) Comment: per pt  BMI 34.54 kg/m²   Neurological Exam  Physical Exam    Strength is 5/5    Assessment/Plan   Ms. Bynum is a 73 year old woman who was previously seen for myositis.  Pathology from muscle biopsy showed inflammatory myopathy with both endomysial and perimysial/vascular inflammation.  Being managed by rheumatology. Work up for malignancy showed a questionable lesion in the pancreas and she is being seen by general surgery at [facility] for follow up.  Also found to have anti-Ku antibodies and fibrosis in the lungs.      Agree with current management of prednisone and methotrexate. She has been very sensitive to lower prednisone. She is going to lower it slower. Will defer management to rheumatology.       Will check CK.     Esperanza Thakur, DO  "

## 2025-01-24 LAB
ALBUMIN SERPL BCP-MCNC: 4.4 G/DL (ref 3.4–5)
ALP SERPL-CCNC: 39 U/L (ref 33–136)
ALT SERPL W P-5'-P-CCNC: 16 U/L (ref 7–45)
ANION GAP SERPL CALC-SCNC: 14 MMOL/L (ref 10–20)
AST SERPL W P-5'-P-CCNC: 25 U/L (ref 9–39)
BASOPHILS # BLD AUTO: 0.02 X10*3/UL (ref 0–0.1)
BASOPHILS NFR BLD AUTO: 0.3 %
BILIRUB SERPL-MCNC: 0.4 MG/DL (ref 0–1.2)
BUN SERPL-MCNC: 13 MG/DL (ref 6–23)
CALCIUM SERPL-MCNC: 10 MG/DL (ref 8.6–10.6)
CHLORIDE SERPL-SCNC: 104 MMOL/L (ref 98–107)
CK SERPL-CCNC: 143 U/L (ref 0–215)
CO2 SERPL-SCNC: 28 MMOL/L (ref 21–32)
CREAT SERPL-MCNC: 0.6 MG/DL (ref 0.5–1.05)
CRP SERPL-MCNC: 0.15 MG/DL
EGFRCR SERPLBLD CKD-EPI 2021: >90 ML/MIN/1.73M*2
EOSINOPHIL # BLD AUTO: 0.02 X10*3/UL (ref 0–0.4)
EOSINOPHIL NFR BLD AUTO: 0.3 %
ERYTHROCYTE [DISTWIDTH] IN BLOOD BY AUTOMATED COUNT: 15.3 % (ref 11.5–14.5)
ERYTHROCYTE [SEDIMENTATION RATE] IN BLOOD BY WESTERGREN METHOD: 9 MM/H (ref 0–30)
GLUCOSE SERPL-MCNC: 95 MG/DL (ref 74–99)
HCT VFR BLD AUTO: 44.8 % (ref 36–46)
HGB BLD-MCNC: 14.4 G/DL (ref 12–16)
IMM GRANULOCYTES # BLD AUTO: 0.02 X10*3/UL (ref 0–0.5)
IMM GRANULOCYTES NFR BLD AUTO: 0.3 % (ref 0–0.9)
LYMPHOCYTES # BLD AUTO: 0.78 X10*3/UL (ref 0.8–3)
LYMPHOCYTES NFR BLD AUTO: 9.9 %
MCH RBC QN AUTO: 31.8 PG (ref 26–34)
MCHC RBC AUTO-ENTMCNC: 32.1 G/DL (ref 32–36)
MCV RBC AUTO: 99 FL (ref 80–100)
MONOCYTES # BLD AUTO: 0.3 X10*3/UL (ref 0.05–0.8)
MONOCYTES NFR BLD AUTO: 3.8 %
NEUTROPHILS # BLD AUTO: 6.72 X10*3/UL (ref 1.6–5.5)
NEUTROPHILS NFR BLD AUTO: 85.4 %
NRBC BLD-RTO: 0 /100 WBCS (ref 0–0)
PLATELET # BLD AUTO: 256 X10*3/UL (ref 150–450)
POTASSIUM SERPL-SCNC: 5.6 MMOL/L (ref 3.5–5.3)
PROT SERPL-MCNC: 6.9 G/DL (ref 6.4–8.2)
RBC # BLD AUTO: 4.53 X10*6/UL (ref 4–5.2)
SODIUM SERPL-SCNC: 140 MMOL/L (ref 136–145)
WBC # BLD AUTO: 7.9 X10*3/UL (ref 4.4–11.3)

## 2025-01-25 LAB — ALDOLASE SERPL-CCNC: 4.5 U/L (ref 1.2–7.6)

## 2025-02-03 ENCOUNTER — APPOINTMENT (OUTPATIENT)
Dept: RHEUMATOLOGY | Facility: CLINIC | Age: 75
End: 2025-02-03
Payer: MEDICARE

## 2025-02-11 ENCOUNTER — APPOINTMENT (OUTPATIENT)
Dept: RHEUMATOLOGY | Facility: CLINIC | Age: 75
End: 2025-02-11
Payer: MEDICARE

## 2025-02-11 DIAGNOSIS — Z79.899 ENCOUNTER FOR LONG-TERM (CURRENT) USE OF HIGH-RISK MEDICATION: Primary | ICD-10-CM

## 2025-02-11 DIAGNOSIS — G72.49 INFLAMMATORY MYOPATHY: ICD-10-CM

## 2025-02-11 PROCEDURE — 1159F MED LIST DOCD IN RCRD: CPT | Performed by: INTERNAL MEDICINE

## 2025-02-11 PROCEDURE — 1123F ACP DISCUSS/DSCN MKR DOCD: CPT | Performed by: INTERNAL MEDICINE

## 2025-02-11 PROCEDURE — 99214 OFFICE O/P EST MOD 30 MIN: CPT | Performed by: INTERNAL MEDICINE

## 2025-02-11 PROCEDURE — 1160F RVW MEDS BY RX/DR IN RCRD: CPT | Performed by: INTERNAL MEDICINE

## 2025-02-11 PROCEDURE — 1157F ADVNC CARE PLAN IN RCRD: CPT | Performed by: INTERNAL MEDICINE

## 2025-02-11 NOTE — PROGRESS NOTES
"Virtual or Telephone Consent    An interactive audio and video telecommunication system which permits real time communications between the patient (at the originating site) and provider (at the distant site) was utilized to provide this telehealth service.   Verbal consent was requested and obtained from Nora Bynum on this date, 02/11/25 for a telehealth visit.            Recall  Patient last seen by me in 2020 at  for MCTD with myositis, sicca symptoms, inflammatory arthritis and MGUS.. I reviewed her old chart     70 year old female with PMH significant for Hashimoto's (+microsomal Ab 2017),  hypertension and hyperlipidemia who presents for follow up of muscle weakness  and elevated CPK     Last seen 3/12/20    Was in her usual state of health - feeling very well, very active at baseline.     stressfully event in July 2019. Bedbugs in her building. She was moving and  lifting a lot of furniture. No other preceding injury, infection or new  medication.  One day in late July she woke up with stiff neck, thought she slept on it  strangely. Within a matter of days her hands \"blew up\". \"Fingers looked like  sausages with water balloons on the end\". Knees looked like water balloons.  Terrible pain everywhere - shoulders, couldn't move her neck. She was terrible  pain.     [place] and felt she had a ?bull's eye rash, no clear  tick bite. Lyme has been negative x2.  8/2019: normal CBCd and CMP, positive GLADYS by EIA, normal CK, RF 16, ESR 47  11/2019: saw rheumatologist Alena Gomes, was told she had RA. She did not  go back  12/16/19: Saw Dr. Ansari in rheumatology- GLADYS > 1:1280, speckled, negative JUSTIN,  dsDNA, ACPA, RF, normal C3, C4, ESR and CRP.     Seen by ortho, felt to have trace effusions both knees but did not feel  aspiration would be helpful. Offered steroid injection but patient declined.  Dr. Fitzpatrick did feel that she had chondrocalcinosis     Has been following with " functional medicine. Felt she may have an unresolved  infection. patient has since had a tooth pulled     Seen by Dr. Jackson on 2/3 who felt she had triggering in fingers on both  hands. X-rays done in November revealed degenerative changes in right AC joint,  bilateral knee degenerative changes L>R with chondrocalcinosis and mild  degenerative changes both wrists and hands with chondrocalcinosis both wrists.  Also mild DJD lumbar spine       DXA 6/12/19: lowest T-score -1.5. [id number] 58.8     Saw someone at  re: Lyme - positive Igenex. Traditional lyme test negative.  doxycylcine x 2 weeks reportedly improved swelling in her fingers and knees     Has been following with ortho regarding her hand pain and numbness  Also likely adhesive capsulitis R shoulder - referred to OT  Right shoulder US showed rotator cuff tendinosis with small supraspinous tendon  tear, and biceps tenosynovitis and subacromial subdeltoid bursitis.  Finger US: focal tendinosis of 3rd and 4th flexor tendons with hyperemia on the  right. Thickening of the 3rd and 4th digit A1 pulleys on the right. Same on the  left but involving 2nd and 3rd digits.  Ultrasounds of feet and ankles revealed mild synovitis L 5th MTP with minimal  hyperemia, felt to be degenerative in nature.    CXR - partial atelectasis along medial bases. Prominence of lung markings  bilaterally. Low lung volume. No effusion. Emphysematous changes suspected in  upper zones.     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  Normal CBC diff, CMP and urinalysis  GLADYS >1:1280, negative JUSTIN, Th/To, PM-Scl and RNA Brandyn III  Component Latest Ref Rng AND Units 12/19/2019 2/5/2020 6/25/2020 7/6/2020  CRP <0.9 mg/dL 0.3 0.3 0.1  WSR 0 - 20 mm/hr 17 31 (H) 8  CK 42 - 196 U/L 421 (H) 1,411 (H) 1,232 (H)  Aldolase 1.5 - 8.1 U/L 12.0 (H) 17.6 (H) 22.4 (H)     We spoke in June and she had been started on prednisone 20 mg on 6/25/2020 by Dr. Pierson at  for possible  PMR  Has been seeing Dr. Jacome at [facility], last on 7/20, down to prednisone 10 mg  with some improvement in her symptoms  70 yr old WF with complicated medical history. Has seen multiple rheumatologist in the city. Here for a second opinion. She has H/O Raynaud's, myositis, and arthritis.   Has very high titer of GLADYS of 1: 1280. JUSTIN panel negative and myositis panel negative but elevated muscle enzymes.   Dr. Jacome discussed malignancy screening and patient declined mammogram and  colonoscopy. patient has not had mammogram since the 1980s and has never had a  colonoscopy. She ordered CT neck, abdomen, pelvis and DXA. Patient declined muscle biopsy       She continue prednisone 20 mg until 7/20, saw her CPK had come down so she  decreased her prednisone to 10 mg.  7/27/20: CRP < 0.5, normal BMP. ALT 59, AST 92, T bili 0.7, albumin 3.3, normal  CBC , CPK 1,524  Negative dsDNA, SSA 1.2, remainder JUSTIN panel negative. SPEP +polyclonal  gammopathy, ESR 31. Repeat anti-SSA antibody was elevated, and anti-DNA antibody   8/12/23  CT neck W report - no adenopathy  Dr. Jacome increased her prednisone again   She does not like the weight gain and currently on 15 mg daily for the past 4  days  Current IS:  mg/day and prednisone 7.5 mg daily  -Reviewed Dr. Duncan's notes and recommend switching AZA to CellCept. Patient agreed.   -Switched AZA to Cellcept. Current dose is 1000 mg BID. We will switch to Myfortic 360 mg 2 pills BID due to side effects with cellcept (can go up to 1080 mg BID)  -Updated labs 12/2023 normal and she started tapering pred by 1 mg q 4 weeks. She is now on 7.5 mg prednisone.    -Reviewed labs from 12/2023: CMP/CBC normal.  CRP 1.79. ESR:12, CK 95, Aldolase: 3.7.Repeat ESR/CRP/CK/aldolase in 1/2024 normal.     Ku ANTIBODY Positive. 4/2023     Chief Complaint: Follow up of inflammatory myopathy and ILD.     History of Present Illness: At today's visit, the patient reports taking prednisone to 13  mg/day since 2025. She has no muscle weakness, rash or difficulty swallowing. No difficulty breathing. No difficulty rasing her arms overhead or getting out of chair. She notices recurrence of symptoms when she reduces her symptoms below 12 mg. She is taking methotrexate 12.5 mg/week.   No morning stiffness or joint pain and swelling.                Review of Systems  Constitutional: Has fatigue  Head: Denies headaches or hair loss  Eyes: Has dry eyes  ENT: Has dry mouth  Cardiovascular: Denies chest pain, palpitations  Respiratory:Has chronic cough  Gastrointestinal: Denies nausea, vomiting, heartburn, abdominal pain , diarrhea or blood in the stool  Integumentary: Denies photosensitivity, rash or lesions, or psoriasis  Neurological: Has weakness and numbness  MSK: Has joint pain and morning stiffness            Active Problems  Problems    · Abnormal TSH (790.6) (R79.89)   · Acute bronchitis (466.0) (J20.9)   · Breast cancer screening (V76.10) (Z12.39)   · Cough (786.2) (R05.9)   · Edema (782.3) (R60.9)   · Encounter for immunization (V03.89) (Z23)   · Herpes zoster without complication (053.9) (B02.9)   · Initial Medicare annual wellness visit (V70.0) (Z00.00)   · Localized edema (782.3) (R60.0)   · Mild vitamin D deficiency (268.9) (E55.9)   · Myalgia, unspecified site (729.1) (M79.10)   · Myopathy (359.9) (G72.9)   · Osteoarthritis of hip (715.95) (M16.9)   · Pain of right hip joint (719.45) (M25.551)   · Polyarthritis (716.50) (M13.0)   · Positive Lyme disease serology (795.79) (R76.8)   · Stiffness of joints of both hands (719.58) (M25.641,M25.642)     Past Medical History  Problems    · No pertinent past medical history (V49.89) (Z78.9)     Surgical History  Problems    · History of Cyst excision     Family History  Mother    · Family history of    · Family history of malignant neoplasm of kidney (V16.51) (Z80.51)  Father    · Family history of    · Family history of chronic kidney  disease (V18.69) (Z84.1)   · Family history of malignant neoplasm of kidney (V16.51) (Z80.51)     Social History  Problems    ·  (V61.03) (Z63.5)   · daughter nearby   · Has 1 child   · Never a smoker   · No alcohol use   · Retired from employment   · 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 (Patient not taking: Reported on 1/23/2025) 180 tablet 1    bisoprolol (Zebeta) 5 mg tablet Take 1.5 tablets (7.5 mg) by mouth once daily at bedtime.      cholecalciferol (Vitamin D3) 50 mcg (2,000 unit) capsule Take 1 capsule (50 mcg) by mouth once daily.      cyanocobalamin (Vitamin B-12) 1,000 mcg tablet Take 1 tablet (1,000 mcg) by mouth once daily.      dofetilide (Tikosyn) 250 mcg capsule Take 1 capsule (250 mcg) by mouth twice a day.      Eliquis 5 mg tablet Take 1 tablet (5 mg) by mouth 2 times a day.      fish oil concentrate (Fish OiL) 120-180 mg capsule Take by mouth.      folic acid (Folvite) 1 mg tablet Take 1 tablet (1 mg) by mouth once daily. 90 tablet 1    magnesium 30 mg tablet Take 1 tablet (30 mg) by mouth once daily.      melatonin 5 mg tablet Take 1 tablet (5 mg) by mouth once daily at bedtime.      predniSONE (Deltasone) 1 mg tablet Take 4 tablets (4 mg) by mouth once daily. 360 tablet 1    predniSONE (Deltasone) 10 mg tablet Take 2 tablets (20 mg) by mouth once daily. 180 tablet 1    predniSONE (Deltasone) 5 mg tablet Take 1 tablet (5 mg) by mouth once daily. 90 tablet 1    predniSONE (Deltasone) 5 mg tablet Take 1 tablet (5 mg) by mouth once daily. Add to the 20 mg tablet for a total of 25 mg daily orally 90 tablet 1    vit B complex 100 combo no.2 (B-100 COMPLEX ORAL) Take 1 capsule by mouth once daily.        No current facility-administered medications for this visit.                 Physical Exam:  Alert and oriented            FINAL DIAGNOSIS  A, B.  LEFT DELTOID AND RECTUS FEMORIS MUSCLES, BIOPSIES:  --INFLAMMATORY MYOPATHY, SEE NOTE.    Note: Histopathologic features are qualitatively similar within the biopsy  specimens, however, they are quantitatively more severe within the rectus  femoris muscle.  The biopsies demonstrate both endomysial and  perimysial/vascular inflammation composed predominantly of CD3 positive T  cells, accompanied by muscle fiber necrosis with regeneration (highlighted with  antibodies to P62, CD68, and CD56).  Trichrome staining demonstrates no ragged  red fibers or rimmed vacuoles, however, it highlights perimysial fibrosis in  the rectus femoris muscle.  Myosin heavy chain immunostains show no fiber type  grouping.  Cytochrome oxidase staining is within normal limits, and [place] red  stain for amyloid is negative.        6/2023  RESULTS:     Lumbar spine (L1-L4): 0.886 g/cm2, T-score -2.5, Z-score -0.8     Right Femoral Neck: 0.765 g/cm2, T-score -2.0, Z-score -0.1   Right Total Hip: 0.656 g/cm2, T-score -2.8, Z-score -1.2     Left Femoral Neck: 0.659 g/cm2, T-score -2.7, Z-score -0.9   Left Total Hip: 0.729 g/cm2, T-score -2.2, Z-score -0.6         Component      Latest Ref Rng 1/23/2025   WBC      4.4 - 11.3 x10*3/uL 7.9    nRBC      0.0 - 0.0 /100 WBCs 0.0    RBC      4.00 - 5.20 x10*6/uL 4.53    HEMOGLOBIN      12.0 - 16.0 g/dL 14.4    HEMATOCRIT      36.0 - 46.0 % 44.8    MCV      80 - 100 fL 99    MCH      26.0 - 34.0 pg 31.8    MCHC      32.0 - 36.0 g/dL 32.1    RED CELL DISTRIBUTION WIDTH      11.5 - 14.5 % 15.3 (H)    Platelets      150 - 450 x10*3/uL 256    Neutrophils %      40.0 - 80.0 % 85.4    Immature Granulocytes %, Automated      0.0 - 0.9 % 0.3    Lymphocytes %      13.0 - 44.0 % 9.9    Monocytes %      2.0 - 10.0 % 3.8    Eosinophils %      0.0 - 6.0 % 0.3     Basophils %      0.0 - 2.0 % 0.3    Neutrophils Absolute      1.60 - 5.50 x10*3/uL 6.72 (H)    Immature Granulocytes Absolute, Automated      0.00 - 0.50 x10*3/uL 0.02    Lymphocytes Absolute      0.80 - 3.00 x10*3/uL 0.78 (L)    Monocytes Absolute      0.05 - 0.80 x10*3/uL 0.30    Eosinophils Absolute      0.00 - 0.40 x10*3/uL 0.02    Basophils Absolute      0.00 - 0.10 x10*3/uL 0.02    GLUCOSE      74 - 99 mg/dL 95    SODIUM      136 - 145 mmol/L 140    POTASSIUM      3.5 - 5.3 mmol/L 5.6 (H)    CHLORIDE      98 - 107 mmol/L 104    Bicarbonate      21 - 32 mmol/L 28    Anion Gap      10 - 20 mmol/L 14    Blood Urea Nitrogen      6 - 23 mg/dL 13    Creatinine      0.50 - 1.05 mg/dL 0.60    EGFR      >60 mL/min/1.73m*2 >90    Calcium      8.6 - 10.6 mg/dL 10.0    Albumin      3.4 - 5.0 g/dL 4.4    Alkaline Phosphatase      33 - 136 U/L 39    Total Protein      6.4 - 8.2 g/dL 6.9    AST      9 - 39 U/L 25    Bilirubin Total      0.0 - 1.2 mg/dL 0.4    ALT      7 - 45 U/L 16    C-Reactive Protein      <1.00 mg/dL 0.15    Sed Rate      0 - 30 mm/h 9    Creatine Kinase      0 - 215 U/L 143    Aldolase      1.2 - 7.6 U/L 4.5       [facility] in 2024 [facility]  THE LOWEST T-SCORE IS -2.6  IN THE RIGHT HIP     1) DIAGNOSIS (based on BMD alone):  OSTEOPOROSIS     Caution: Medical conditions other than osteoporosis may cause low bone   density, such as osteomalacia or renal osteodystrophy.   Clinical   correlation is necessary.      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 She is in remission. She is aware that methotrexate can potentially worsen the ILD although recent data are showing that it may not increase ILD. Her DLCO is normal.   She needs to follow up with pulmonary.   I  advised to hear to reduce the prednisone to 10 mg/day. She wants to have her CK rechecked before reducing her prednisone. Labs ordered.  She has osteoporosis but declines treatment. She understand the risk.         electronically signed by Lisbeth Jacome

## 2025-02-24 ENCOUNTER — APPOINTMENT (OUTPATIENT)
Dept: PULMONOLOGY | Facility: CLINIC | Age: 75
End: 2025-02-24
Payer: MEDICARE

## 2025-02-25 DIAGNOSIS — Z79.899 ENCOUNTER FOR LONG-TERM (CURRENT) USE OF HIGH-RISK MEDICATION: ICD-10-CM

## 2025-03-05 LAB
ALBUMIN SERPL-MCNC: 4 G/DL (ref 3.6–5.1)
ALDOLASE SERPL-CCNC: 4.7 U/L
ALP SERPL-CCNC: 44 U/L (ref 37–153)
ALT SERPL-CCNC: 23 U/L (ref 6–29)
ANION GAP SERPL CALCULATED.4IONS-SCNC: 5 MMOL/L (CALC) (ref 7–17)
AST SERPL-CCNC: 27 U/L (ref 10–35)
BILIRUB SERPL-MCNC: 0.4 MG/DL (ref 0.2–1.2)
BUN SERPL-MCNC: 14 MG/DL (ref 7–25)
CALCIUM SERPL-MCNC: 9.4 MG/DL (ref 8.6–10.4)
CHLORIDE SERPL-SCNC: 108 MMOL/L (ref 98–110)
CK SERPL-CCNC: 87 U/L (ref 18–225)
CO2 SERPL-SCNC: 30 MMOL/L (ref 20–32)
CREAT SERPL-MCNC: 0.55 MG/DL (ref 0.6–1)
EGFRCR SERPLBLD CKD-EPI 2021: 95 ML/MIN/1.73M2
GLUCOSE SERPL-MCNC: 100 MG/DL (ref 65–99)
POTASSIUM SERPL-SCNC: 4.8 MMOL/L (ref 3.5–5.3)
PROT SERPL-MCNC: 6.3 G/DL (ref 6.1–8.1)
SODIUM SERPL-SCNC: 143 MMOL/L (ref 135–146)

## 2025-03-07 ENCOUNTER — APPOINTMENT (OUTPATIENT)
Dept: PRIMARY CARE | Facility: CLINIC | Age: 75
End: 2025-03-07
Payer: MEDICARE

## 2025-03-10 DIAGNOSIS — G72.49 INFLAMMATORY MYOPATHY: Primary | ICD-10-CM

## 2025-03-10 RX ORDER — METHOTREXATE 2.5 MG/1
12.5 TABLET ORAL WEEKLY
Qty: 60 TABLET | Refills: 1 | Status: SHIPPED | OUTPATIENT
Start: 2025-03-10 | End: 2025-06-08

## 2025-03-11 ENCOUNTER — APPOINTMENT (OUTPATIENT)
Dept: PRIMARY CARE | Facility: CLINIC | Age: 75
End: 2025-03-11
Payer: MEDICARE

## 2025-03-11 VITALS
TEMPERATURE: 96.2 F | WEIGHT: 202.6 LBS | SYSTOLIC BLOOD PRESSURE: 126 MMHG | OXYGEN SATURATION: 98 % | DIASTOLIC BLOOD PRESSURE: 82 MMHG | BODY MASS INDEX: 35.9 KG/M2 | HEART RATE: 57 BPM | HEIGHT: 63 IN

## 2025-03-11 DIAGNOSIS — I27.20 PULMONARY HYPERTENSION (MULTI): Primary | ICD-10-CM

## 2025-03-11 DIAGNOSIS — E78.5 DYSLIPIDEMIA: ICD-10-CM

## 2025-03-11 DIAGNOSIS — I48.0 PAROXYSMAL ATRIAL FIBRILLATION (MULTI): ICD-10-CM

## 2025-03-11 DIAGNOSIS — I48.92 ATRIAL FLUTTER, UNSPECIFIED TYPE (MULTI): ICD-10-CM

## 2025-03-11 DIAGNOSIS — J84.9 ILD (INTERSTITIAL LUNG DISEASE) (MULTI): ICD-10-CM

## 2025-03-11 DIAGNOSIS — G72.49 IDIOPATHIC INFLAMMATORY MYOPATHY: ICD-10-CM

## 2025-03-11 DIAGNOSIS — E06.3 HASHIMOTO'S DISEASE: ICD-10-CM

## 2025-03-11 PROCEDURE — 1157F ADVNC CARE PLAN IN RCRD: CPT | Performed by: STUDENT IN AN ORGANIZED HEALTH CARE EDUCATION/TRAINING PROGRAM

## 2025-03-11 PROCEDURE — 1160F RVW MEDS BY RX/DR IN RCRD: CPT | Performed by: STUDENT IN AN ORGANIZED HEALTH CARE EDUCATION/TRAINING PROGRAM

## 2025-03-11 PROCEDURE — 1123F ACP DISCUSS/DSCN MKR DOCD: CPT | Performed by: STUDENT IN AN ORGANIZED HEALTH CARE EDUCATION/TRAINING PROGRAM

## 2025-03-11 PROCEDURE — 1126F AMNT PAIN NOTED NONE PRSNT: CPT | Performed by: STUDENT IN AN ORGANIZED HEALTH CARE EDUCATION/TRAINING PROGRAM

## 2025-03-11 PROCEDURE — 99205 OFFICE O/P NEW HI 60 MIN: CPT | Performed by: STUDENT IN AN ORGANIZED HEALTH CARE EDUCATION/TRAINING PROGRAM

## 2025-03-11 PROCEDURE — 1036F TOBACCO NON-USER: CPT | Performed by: STUDENT IN AN ORGANIZED HEALTH CARE EDUCATION/TRAINING PROGRAM

## 2025-03-11 PROCEDURE — 1159F MED LIST DOCD IN RCRD: CPT | Performed by: STUDENT IN AN ORGANIZED HEALTH CARE EDUCATION/TRAINING PROGRAM

## 2025-03-11 ASSESSMENT — PATIENT HEALTH QUESTIONNAIRE - PHQ9
2. FEELING DOWN, DEPRESSED OR HOPELESS: NOT AT ALL
SUM OF ALL RESPONSES TO PHQ9 QUESTIONS 1 AND 2: 0
1. LITTLE INTEREST OR PLEASURE IN DOING THINGS: NOT AT ALL

## 2025-03-11 ASSESSMENT — ENCOUNTER SYMPTOMS
OCCASIONAL FEELINGS OF UNSTEADINESS: 0
DEPRESSION: 0
LOSS OF SENSATION IN FEET: 0

## 2025-03-11 ASSESSMENT — PAIN SCALES - GENERAL: PAINLEVEL_OUTOF10: 0-NO PAIN

## 2025-03-11 NOTE — PROGRESS NOTES
Subjective   Patient ID: Nora Bynum is a 75 y.o. female who presents for New Patient Visit.  HPI  Cassidy is here to establish care.    She is following closely with Rheumatology, Neurology for her inflammatory myositis. tapering on prednisone and currently taking 10mg daily.     Following with Cardiology for atrial fibrillation, maintained on eliquis, tikosyn.    Taking multiple supplements: Folic acid, magnesium, lithium orotate 5mg daily     PMHx: inflammatory myositis, ILD, osteoporosis, atrial fibrillation  SurgHx: cyst excision  FamHx: kidney CA - mother  SocialHx: Never smoker. Never vaped. No EtOH use. No drug use. Lives at home alone. Daughter is living locally. 2 grandchildren.     Review of Systems  12-point ROS was reviewed and is negative, unless otherwise noted in HPI    Objective   Vitals:    03/11/25 1113   BP: 126/82   Pulse: 57   Temp: 35.7 °C (96.2 °F)   SpO2: 98%      Physical Exam  GEN: alert, conversant, NAD  HEENT: PERRL, EOMI, MMM, Tms pearly gray bilaterally  NECK: supple, no LAD appreciated  CHEST: CTAB  CV: S1, S2, RRR, no murmurs appreciated  ABD: soft, NT, ND  EXT: no significant LE edema  SKIN: warm, dry    Assessment/Plan   #inflammatory myositis  #osteoporosis  Last DEXA 2024. Declines treatment for osteoporosis  - Following regularly with Neurology,  Rheumatology  - maintained on methotrexate, folic acid  - tapering prednisone, currently taking 10mg daily     #atrial fibrillation, paroxysmal  Following with [facility] Cardiology at [facility]  - maintained on Eliquis BID and dofetilide 250mcg daily    #Connective tissue disease related ILD  High-resolution CT in February 2024 shows minimal fibrotic changes  - Following regularly with Pulmonology  For PFT every 6 months follow-up PFTs, yearly ECHOs    #right hip OA  - following with Orthopedics at [facility], considering replacement in the future    #hashimotos  #subclinical hypothyroidism  - repeat TPO, TSH w/ free T4 today  has follow up scheduled  with Endocrinology in 9/2025    Health Maintenance:  Vaccines: COVID (UTD), Flu (UTD), TDAP (advised), Shingles (UTD), Pneumonia (x2), RSV (advised)  Screening: Colonoscopy (declines colonoscopy. Cologuard 2023), Mammogram (declines), Paptest (>10 years ago), DEXA (2024)  Labs: Reviewed recent, TPO and TSH    I spent >60 minutes reviewing chart, visiting with patient, writing prescriptions and documenting in the chart.       RTC in 6 months for medicare wellness visit, or sooner PRN    Nikolay Swanson DO

## 2025-03-12 LAB
THYROPEROXIDASE AB SERPL-ACNC: 4 IU/ML
TSH SERPL-ACNC: 2.2 MIU/L (ref 0.4–4.5)

## 2025-03-17 ENCOUNTER — APPOINTMENT (OUTPATIENT)
Facility: CLINIC | Age: 75
End: 2025-03-17
Payer: MEDICARE

## 2025-03-21 NOTE — PROGRESS NOTES
Subjective   Patient ID: [id number]   Nora Bynum is a 75 y.o. female with OA of right hip, inflammatory myopathy (anti-Ku positive) with MCTD, Afib (eliquis), and vitamin D deficiency, presenting for follow up.   Previously saw Dr. Jacome and Dr. Barbosa (last visit 5/30/23), Dr. Genao in 9/23, saw me once in 10/23, then followed up with Dr. Greco for insurance purposes, kept seeing Dr. Jacome in between and now back to me    Current IS:  - Prednisone 7 mg daily started today  - Methotrexate 12.5 mg weekly plus FA 1 mg daily started 10/9/24    Previous:  - MMF caused GI SE  - Myfortic, also causes GI SE, switched back to AZA  - Imuran 50mg BID, restarted in 8/24, discontinued in 10/24 due to flares       HPI  Saw cardio for atrial fibrillation, paroxysmal, following with EP Cardiology at [facility], maintained on Eliquis BID and dofetilide 250mcg daily, doing well   Saw Dr. Jacome as virtual visit in 2/25, needs follow up with pulm for ILD, advised to reduce prednisone to 10 mg daily. OP but declining tx  Saw neurology, deferred management to us  Right hip OA, follow with ortho at [facility], considering replacement in the future  Follows up with pulm for ILD  Follows up with endo for subclinical hypothyroidims  She is feeling well, CPK is normal so she is tapering the steroids, currently started 7 mg (was on 8 mg before that)  She has an rodrigo with Dr. Duncan for ILD in April, said she might move it as pulm is not on her top list of doctors to see and she feels fine  Cough is coming back with going down on prednisone but no chest pain or dyspnea  Last PFTs in 8/24, doesn't want to do them every 6 months   No rashes, alopecia or photosensitivity  No oral or nasal ulcers  No episodes of eye inflammation  No chest pain, cough or dyspnea  No GI/ sx   No infections  Compliant with meds  No side effects reported    From previous visits:  She was seeing Dr. Greco and on Myfortic with prednisone, she was tapering down her steroids  till 5 mg and had a flare of her myositis with elevated CPK, CRP and weakness as well as Afib, saw Dr. Jacome who increased her prednisone and recommended adding AZA, but pt stopped myfortic on her own due to GI upset, improved with increasing steroids  Saw pulm Dr. 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  Recommend:  PFT every 6 months  Could not tolerate myfortic  and switched back to imuran    Antifibrotic is not indicated currently  (Ofev)  Recommend annual echocardiography for pulm HTN  Saw cardio for afib, echo cardio with normal EF and no pulm HTN at [facility]   Saw general surgery for pancreatic cysts, follow up imaging in 2 years  Patient doesn't want to be switched to methotrexate due to risk of pneumonitis with her ILD    ROS  As per HPI     Rheum hx (Recall from Dr. Genao's notes):  Previously saw Dr. Jacome at [facility] since 2019. Nml CK, RF 16, ESR 47. Then saw Dr. Gomes and Elan (12/2019 GLADYS > 1:1280, negative JUSTIN, nml C3/C4, RF/CCP negative)   Sjogrens, SSA+ , Raynauds, dry mouth since 2019 June 2022, Prednisone 20mg daily 6/25/23 by Dr. Pierson for possible PMR (also hx of elevated CK up to 1411 previously), followed up with Dr. Jacome 4/2023: fatigue, muscle weakness in shoulder > hip, CK 1649, Raynauds - concern for inflammatory myopathy. EMG done by Dr. Thakur (Neurology) and recommended for muscle biopsy. She underwent MM bx 5/17/23 for Left deltoid and left rectus femoris. Pt was called by Dr. 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      She followed up with Alphonso Stanton on 5/30/23: On Pred 60mg daily and Azathioprine 150mg daily.  Muscle pain improved, steorid taper by 10mg in 4 weeks, then 50mg X3 weeks, then 40mg X2 weeks. Continue Aza 150mg daily.      Pt's initial presenting sx were bilateral shoulder and leg stiffness, weakness, UE >LE. The arm weakness and limitations in activity, difficulty doing overhead activity, started December 2022/ Jan 2023 with progressive weakness. Weakness in hip girdle.   90% improvement with steroids and AZA     + dry mouth, dry eyes, started in the more recent years  Denies fever, or current unintentional weight loss (reports some weight loss intentionally with intermittent fasting but overall has had ~100 lb weight loss over the last few years)  Denies hx of uveitis (gets eye exam annually)  Denies photosensitive rash  No hx of blood clots  Denies current difficulty swallowing.   Denies hx of seizure/CVA. + A fib (Dr. Pritchett/Afton Volborg) , has had Echo. On Dofetilide/Eliquis for A fib  Ct chest abd/pelvis was ordered for malignancy workup. She had CT chest done at [facility]. CT abd/pelvis not done yet.      FMH of autoimmune disease: Unsure  PSH: Plans for hip replacement with Dr. Killian due to OA however due to inflammatory myopathy, unable to get this done currently.   SocHX: Never smoker.     Patient Active Problem List   Diagnosis    Abnormal TSH    Edema    Herpes zoster without complication    Mild vitamin D deficiency    Myalgia, unspecified site    Myopathy    Osteoarthritis of hip    Polyarthritis    Positive Lyme disease serology    Stiffness of joints of both hands    Arthritis of shoulder    MCTD (mixed connective tissue disease) (Multi)    Lung fibrosis (Multi)    Long term (current) use of systemic steroids    Long-term use of high-risk medication    Bilateral hand pain    Chronic pain of both shoulders    Decreased range of motion of right shoulder    Dyslipidemia    Elevated blood pressure reading without diagnosis of hypertension    HyperCKemia    Nuclear senile cataract of both eyes     Obesity, Class I, BMI 30-34.9    Paroxysmal atrial fibrillation (Multi)    Osteoarthritis of both knees    Perianal abscess    Pneumonia due to COVID-19 virus    Polyarthralgia    Bilateral hip pain    Systolic dysfunction    Thyroid antibody positive    Weakness generalized    Vitamin D deficiency    Shoulder stiffness, right    Stiffness of right hand joint    Idiopathic inflammatory myopathy    Localized osteoporosis without current pathological fracture    Interstitial lung disease (Multi)    History of atrial fibrillation    Other cardiomyopathies    Primary localized osteoarthritis of pelvic region and thigh    Pulmonary hypertension (Multi)     Past Medical History:   Diagnosis Date    ILD (interstitial lung disease) (Multi)     Neuromuscular disorder (Multi)     Osteoporosis     Other specified health status     No pertinent past medical history     Past Surgical History:   Procedure Laterality Date    OTHER SURGICAL HISTORY  06/23/2020    Cyst excision     Social History     Socioeconomic History    Marital status: Single     Spouse name: Not on file    Number of children: Not on file    Years of education: Not on file    Highest education level: Not on file   Occupational History    Not on file   Tobacco Use    Smoking status: Never    Smokeless tobacco: Never   Vaping Use    Vaping status: Never Used   Substance and Sexual Activity    Alcohol use: Not Currently    Drug use: Never    Sexual activity: Defer   Other Topics Concern    Not on file   Social History Narrative    Not on file     Social Drivers of Health     Financial Resource Strain: Low Risk  (6/15/2023)    Received from [facility]    Overall Financial Resource Strain (CARDIA)     Difficulty of Paying Living Expenses: Not hard at all   Food Insecurity: No Food Insecurity (6/15/2023)    Received from [facility]    Hunger Vital Sign     Worried About Running Out of Food in the Last Year: Never  true     Ran Out of Food in the Last Year: Never true   Transportation Needs: No Transportation Needs (6/15/2023)    Received from [facility]    PRAPARE - Transportation     Lack of Transportation (Medical): No     Lack of Transportation (Non-Medical): No   Physical Activity: Unknown (6/8/2022)    Received from [facility]    Exercise Vital Sign     Days of Exercise per Week: Patient declined     Minutes of Exercise per Session: Patient declined   Stress: No Stress Concern Present (6/8/2022)    Received from [facility]    [employer] [place] [employer] - Occupational Stress Questionnaire     Feeling of Stress : Not at all   Social Connections: Moderately Integrated (6/8/2022)    Received from [facility]    Social Connection and Isolation Panel [NHANES]     Frequency of Communication with Friends and Family: More than three times a week     Frequency of Social Gatherings with Friends and Family: Patient declined     Attends Yazidism Services: More than 4 times per year     Active Member of Clubs or Organizations: Yes     Attends Club or Organization Meetings: More than 4 times per year     Marital Status:    Intimate Partner Violence: Not on file   Housing Stability: Low Risk  (6/15/2023)    Received from [facility]    Housing Stability Vital Sign     Unable to Pay for Housing in the Last Year: No     Number of Places Lived in the Last Year: 1     Unstable Housing in the Last Year: No     No Known Allergies     Current Outpatient Medications:     bisoprolol (Zebeta) 5 mg tablet, Take 1.5 tablets (7.5 mg) by mouth once daily at bedtime., Disp: , Rfl:     cholecalciferol (Vitamin D3) 50 mcg (2,000 unit) capsule, Take 1 capsule (50 mcg) by mouth once daily., Disp: , Rfl:     cyanocobalamin (Vitamin B-12) 1,000 mcg tablet, Take 1 tablet (1,000 mcg) by mouth once daily., Disp: , Rfl:      dofetilide (Tikosyn) 250 mcg capsule, Take 1 capsule (250 mcg) by mouth twice a day., Disp: , Rfl:     Eliquis 5 mg tablet, Take 1 tablet (5 mg) by mouth 2 times a day., Disp: , Rfl:     fish oil concentrate (Fish OiL) 120-180 mg capsule, Take by mouth., Disp: , Rfl:     folic acid (Folvite) 1 mg tablet, Take 1 tablet (1 mg) by mouth once daily., Disp: 90 tablet, Rfl: 1    magnesium 30 mg tablet, Take 1 tablet (30 mg) by mouth once daily., Disp: , Rfl:     melatonin 5 mg tablet, Take 1 tablet (5 mg) by mouth once daily at bedtime., Disp: , Rfl:     methotrexate (Trexall) 2.5 mg tablet, Take 5 tablets (12.5 mg total) by mouth 1 (one) time per week.  Follow directions carefully, and ask to explain any part you do not understand. Take exactly as directed., Disp: 60 tablet, Rfl: 1    predniSONE (Deltasone) 5 mg tablet, Take 1 tablet (5 mg) by mouth once daily. Add to the 20 mg tablet for a total of 25 mg daily orally, Disp: 90 tablet, Rfl: 1    vit B complex 100 combo no.2 (B-100 COMPLEX ORAL), Take 1 capsule by mouth once daily., Disp: , Rfl:      Objective   Visit Vitals  /77   Pulse 97   Temp 36.7 °C (98.1 °F)   Resp 20     Physical Exam:  General: AAOx3, Cooperative  Head: normocephalic, atraumatic, no hair loss   Eyes: EOMI, conjunctiva clear, sclera white, anicteric, puffy lower eyelids   Neuro: CN II-XII grossly intact, no focal deficit, motor power 5/5 diffusely   Skin: No rashes, ulcers or photosensitive areas, bilateral LL varicose veins   MSK: Upper Extremities:  Hand/Fingers: No erythema, edema, tenderness or warmth at DIP, PIP, or MCP joints, FROM grossly. Good hand . No nodules. Squaring of the 1st CMCs  Wrists: No erythema, edema, warmth or tenderness at wrist, FROM grossly  Elbows: No tenderness, edema, erythema or warmth at elbows, FROM grossly. No nodules   Shoulders: No edema, erythema, tenderness or warmth at shoulders. FROM  Lower Extremities:   Hips: No obvious deformities. No joint  tenderness. No trochanteric bursae TTP  Knees: No tenderness, deformities, edema, rashes, or warmth, normal ROM grossly. No crepitus, no pes anserine bursa TTP   Ankles, feet: No deformities, tenderness, edema, erythema, ulceration, or warmth at the ankle or MTP/IP joints, normal ROM grossly  Spine: No spinal tenderness to palpation. No SI joint tenderness    Lab Results   Component Value Date    WBC 6.9 04/01/2025    HGB 14.5 04/01/2025    HCT 43.2 04/01/2025    MCV 96.0 04/01/2025     04/01/2025        Chemistry    Lab Results   Component Value Date/Time     04/01/2025 1042    K 4.2 04/01/2025 1042     04/01/2025 1042    CO2 28 04/01/2025 1042    BUN 12 04/01/2025 1042    CREATININE 0.60 04/01/2025 1042    Lab Results   Component Value Date/Time    CALCIUM 9.6 04/01/2025 1042    ALKPHOS 44 04/01/2025 1042    AST 24 04/01/2025 1042    ALT 18 04/01/2025 1042    BILITOT 0.6 04/01/2025 1042        Lab Results   Component Value Date    CRP 0.15 01/23/2025      Lab Results   Component Value Date    SEDRATE 15 04/01/2025      Lab Results   Component Value Date    HEPBCAB NONREACTIVE 08/14/2023    HEPCAB NONREACTIVE 08/14/2023      Lab Results   Component Value Date    ALT 18 04/01/2025    AST 24 04/01/2025    ALKPHOS 44 04/01/2025    BILITOT 0.6 04/01/2025         === 04/26/23 ===  XR SHOULDERS  Mild degenerative changes bilateral shoulders.      CT chest 2/24:  Mild pulmonary fibrotic changes without honeycombing or bronchiectasis  Small hiatal hernia with patulous distal esophagus  Pancreatic cystic lesion in the body may be further assessed with pancreas MRI    CT abd pelvis 7/23:  1. No abdominopelvic lymphadenopathy.  2. Bilateral subpleural predominant reticular and ground-glass opacities within the bilateral lung dependent regions, likely  secondary to atelectasis, an underlying component of fibrosis cannotbe excluded.  3. Patulous distal esophagus with layering fluid, most compatible with  patient's history of Sjogren syndrome.  4. Wedge-shaped low-density defects within the medial aspect of the spleen, likely reflecting chronic splenic infarct.  5. There is an indeterminate 5 mm low-density lesion within the pancreatic body. Recommend further evaluation with MRI pancreas and  MRCP.    MRI abdomen 7/23 pertinent findings:  -Pancreas: Pancreas is normal in precontrast T1 signal intensity with no solid masses or main pancreatic duct dilatation.   -Multiple unilocular cystic pancreatic lesions communicate with the main pancreatic duct are noted for example a 1.3 cm cystic structure in the   pancreatic body (3:37). 3 mm cystic lesion at the junction of the pancreatic body and tail (3:36). No worrisome imaging features.   -Liver: 1 cm right hepatic lobe cyst. No enhancing mass.   -Spleen: Wedge-shaped area of T2 hypointense signal in the spleen may be related to prior trauma.   -Kidneys: 8 mm thinly septated cyst interpolar right kidney.      Biopsy of left deltoid and rectus femoris muscle 5/23:  INFLAMMATORY MYOPATHY    Note: Histopathologic features are qualitatively similar within the biopsy  specimens, however, they are quantitatively more severe within the rectus  femoris muscle.  The biopsies demonstrate both endomysial and  perimysial/vascular inflammation composed predominantly of CD3 positive T cells, accompanied by muscle fiber necrosis with regeneration (highlighted with antibodies to P62, CD68, and CD56).  Trichrome staining demonstrates no ragged red fibers or rimmed vacuoles, however, it highlights perimysial fibrosis in the rectus femoris muscle.  Myosin heavy chain immunostains show no fiber type grouping.  Cytochrome oxidase staining is within normal limits, and Congo red stain for amyloid is negative.     DEXA 7/23:  Lumbar spine (L1-L4): 0.886 g/cm2, T-score -2.5  Right Femoral Neck: 0.765 g/cm2, T-score -2.0  Right Total Hip: 0.656 g/cm2, T-score -2.8  Left Femoral Neck: 0.659 g/cm2,  T-score -2.7  Left Total Hip: 0.729 g/cm2, T-score -2.2      Assessment/Plan    This is a 75Y F with inflammatory myopathy, MCTD with ILD, OP, vitamin D def and OA of hip, presents for follow up on myositis  Last seen by me in 10/24 and Dr. Jacome in 2/25     Labs:  3/25: CMP, CPK, CBC, ESR, CRP and aldolase wnl  3/25: CMP, CPK, aldolase, TSH wnl  Anti-TPO neg  1/25: ALC 0.78, K 5.6, rest of CBC, CMP, ESR, CRP, CPK, aldolase wnl  1/25: CPK wnl  12/24: CMP, ESR, CRP, CPK, aldolase wnl  12/24: ALC 0.78, rest of CBC, CMP, ESR, CRP, aldolase wnl.   11/24: , aldolase, CMP wnl  11/24: , aldolase and CMP wnl  10/24: , aldolase 9.4, AST 46, rest of CMP wnl  10/24: AST 40, , aldolase 7.9, rest of CMP wnl  9/24: , AST 42, rest of CMP wnl  8/24: AST 40, rest of CMP, CPK, aldolase wnl  7/24: ALC 0.5, AST 42, CRP 1.9, , aldolase, ESR, rest of CBC, CMP wnl  4/24: ESR, CRP, CPK, aldolase, CBC, CMP wnl  1/24: ESR, CRP, CPK, aldolase, vitamin D, CBC, CMP wnl  12/23: CRP 1.8, ALC 0.5, rest of CBC, CMP, CPK, aldolase, ESR, C3, C4 wnl  11/23: CPK, aldolase, ESR, CRP wnl  10/23: LFTs, CPK, aldolase, CBC, CMP, ESR, CRP wnl  6/23 CK 1014 --> 435 --> 349 --> 141 --> 78 (9/23)   Aldolase 22.3 --> 8.7 --> 5.0 (9/23)  5/23: ESR, CRP wnl  4/23: SPEP no definitive M protein. Aldolase 27.3, CK 1649  GLADYS 1:2560, chrom >8  4/23: TPMT 22.4, Tspot TB negative. HBsAg Negative  Extended myositis panel shows + anti- Ku     Imaging:  DEXA 11/24:  Lumbar Spine (L1, L2, L3, L4): Total BMD: 0.879  g/cm2, T-score: -2.5  Right Femoral Neck: 0.774  g/cm2 , T-score -1.9  Right Total Hip: 0.681  g/cm2 , T-score -2.6  Left Femoral Neck: 0.699  g/cm2 , T-score -2.4  Left Total Hip:  0.772  g/cm2 ,   No FRAX      # Inflammatory myopathy, Extended myositis panel +Ku, MM biopsy CK and aldolase trending down, sx recurring with tapering of pred to 16.5mg  Extensive discussion with the patient about her disease and  treatment options  Will stop Imuran (uptredning CPK after 3 months of tx), pt would like to start methotrexate although she is worried about the risk of methotrexate induced pneumonitis  All of her questions were answered   # MCTD: inflammatory myopathy, Sjogrens (SSA+, GLADYS +, sicca symptoms), Chr + , Raynauds  # Long term systemic steroids  # OP: Repeat DEXA done at [facility] (11/24- reviewed results- pt has OP worse T score -2.5 of the L spine, on vitamin D and calcium, does not want to add therapies now, would like to have better nutrition first, she was losing weight before and couldn't take her vitamins - briefly discussed BP     - Continue methotrexate 12.5 mg weekly plus daily FA   - Close follow up for any pulm sx   - Continue Prednisone 7 mg daily, she will taper based on blood tests    - Labs (CK, aldolase, CBC, CMP, ESR, CRP) standing orders   - Although anti Ku does not carry a significant associated risk of malignancy- age related cancer screening encouraged. Pt declines Mammogram. Reports no prior colonoscopy but does annual FOBT. SPEP no monoclonal gammopathy  - Follow up with pulm Dr. Duncan for CT chest, PFTs and echocardio  - Referred to physical therapy for muscle strengthening   - Saw surgery for pancreas and recommended monitoring, repeat MR in 2 years   - Follow up with cardio for Afib, recent echo is normal    - Has had prevnar, shingles and covid vaccinations  - Vitamin D 2000 international units daily   - OP management deferred for now, pt refusing tx   - Consider IVIg if needed, pt would like to defer it for now     RTC in 3-4 months     Plan, including risks and benefits, was discussed with the patient, informed on how to reach us.     To schedule an appointment, call [phone]  To reach the rheumatology office, call [phone]    Vikki Duckworth MD   Division of Rheumatology  [facility]

## 2025-04-02 ENCOUNTER — APPOINTMENT (OUTPATIENT)
Dept: RHEUMATOLOGY | Facility: CLINIC | Age: 75
End: 2025-04-02
Payer: MEDICARE

## 2025-04-02 VITALS
BODY MASS INDEX: 35.78 KG/M2 | TEMPERATURE: 98.1 F | WEIGHT: 202 LBS | HEART RATE: 97 BPM | SYSTOLIC BLOOD PRESSURE: 145 MMHG | DIASTOLIC BLOOD PRESSURE: 77 MMHG | RESPIRATION RATE: 20 BRPM

## 2025-04-02 DIAGNOSIS — M15.0 PRIMARY OSTEOARTHRITIS INVOLVING MULTIPLE JOINTS: ICD-10-CM

## 2025-04-02 DIAGNOSIS — E55.9 VITAMIN D DEFICIENCY: ICD-10-CM

## 2025-04-02 DIAGNOSIS — G72.49 IDIOPATHIC INFLAMMATORY MYOPATHY: Primary | ICD-10-CM

## 2025-04-02 DIAGNOSIS — Z79.631 ON METHOTREXATE THERAPY: ICD-10-CM

## 2025-04-02 DIAGNOSIS — M81.6 LOCALIZED OSTEOPOROSIS WITHOUT CURRENT PATHOLOGICAL FRACTURE: ICD-10-CM

## 2025-04-02 DIAGNOSIS — G72.49 IDIOPATHIC INFLAMMATORY MYOPATHY: ICD-10-CM

## 2025-04-02 DIAGNOSIS — M35.1 MCTD (MIXED CONNECTIVE TISSUE DISEASE) (MULTI): ICD-10-CM

## 2025-04-02 DIAGNOSIS — Z79.624 ENCOUNTER FOR LONG TERM CURRENT AZATHIOPRINE THERAPY: ICD-10-CM

## 2025-04-02 DIAGNOSIS — Z79.52 LONG TERM (CURRENT) USE OF SYSTEMIC STEROIDS: ICD-10-CM

## 2025-04-02 DIAGNOSIS — J84.9 ILD (INTERSTITIAL LUNG DISEASE) (MULTI): ICD-10-CM

## 2025-04-02 DIAGNOSIS — M33.20 POLYMYOSITIS: ICD-10-CM

## 2025-04-02 LAB
ALBUMIN SERPL-MCNC: 4.3 G/DL (ref 3.6–5.1)
ALDOLASE SERPL-CCNC: 4.7 U/L
ALP SERPL-CCNC: 44 U/L (ref 37–153)
ALT SERPL-CCNC: 18 U/L (ref 6–29)
ANION GAP SERPL CALCULATED.4IONS-SCNC: 7 MMOL/L (CALC) (ref 7–17)
AST SERPL-CCNC: 24 U/L (ref 10–35)
BASOPHILS # BLD AUTO: 21 CELLS/UL (ref 0–200)
BASOPHILS NFR BLD AUTO: 0.3 %
BILIRUB SERPL-MCNC: 0.6 MG/DL (ref 0.2–1.2)
BUN SERPL-MCNC: 12 MG/DL (ref 7–25)
CALCIUM SERPL-MCNC: 9.6 MG/DL (ref 8.6–10.4)
CHLORIDE SERPL-SCNC: 107 MMOL/L (ref 98–110)
CK SERPL-CCNC: 91 U/L (ref 18–225)
CO2 SERPL-SCNC: 28 MMOL/L (ref 20–32)
CREAT SERPL-MCNC: 0.6 MG/DL (ref 0.6–1)
CRP SERPL-MCNC: <3 MG/L
EGFRCR SERPLBLD CKD-EPI 2021: 93 ML/MIN/1.73M2
EOSINOPHIL # BLD AUTO: 83 CELLS/UL (ref 15–500)
EOSINOPHIL NFR BLD AUTO: 1.2 %
ERYTHROCYTE [DISTWIDTH] IN BLOOD BY AUTOMATED COUNT: 13.5 % (ref 11–15)
ERYTHROCYTE [SEDIMENTATION RATE] IN BLOOD BY WESTERGREN METHOD: 15 MM/H
GLUCOSE SERPL-MCNC: 84 MG/DL (ref 65–99)
HCT VFR BLD AUTO: 43.2 % (ref 35–45)
HGB BLD-MCNC: 14.5 G/DL (ref 11.7–15.5)
LYMPHOCYTES # BLD AUTO: 966 CELLS/UL (ref 850–3900)
LYMPHOCYTES NFR BLD AUTO: 14 %
MCH RBC QN AUTO: 32.2 PG (ref 27–33)
MCHC RBC AUTO-ENTMCNC: 33.6 G/DL (ref 32–36)
MCV RBC AUTO: 96 FL (ref 80–100)
MONOCYTES # BLD AUTO: 559 CELLS/UL (ref 200–950)
MONOCYTES NFR BLD AUTO: 8.1 %
NEUTROPHILS # BLD AUTO: 5272 CELLS/UL (ref 1500–7800)
NEUTROPHILS NFR BLD AUTO: 76.4 %
PLATELET # BLD AUTO: 240 THOUSAND/UL (ref 140–400)
PMV BLD REES-ECKER: 9.4 FL (ref 7.5–12.5)
POTASSIUM SERPL-SCNC: 4.2 MMOL/L (ref 3.5–5.3)
PROT SERPL-MCNC: 6.7 G/DL (ref 6.1–8.1)
RBC # BLD AUTO: 4.5 MILLION/UL (ref 3.8–5.1)
SODIUM SERPL-SCNC: 142 MMOL/L (ref 135–146)
WBC # BLD AUTO: 6.9 THOUSAND/UL (ref 3.8–10.8)

## 2025-04-02 PROCEDURE — 99214 OFFICE O/P EST MOD 30 MIN: CPT | Performed by: STUDENT IN AN ORGANIZED HEALTH CARE EDUCATION/TRAINING PROGRAM

## 2025-04-02 PROCEDURE — 1157F ADVNC CARE PLAN IN RCRD: CPT | Performed by: STUDENT IN AN ORGANIZED HEALTH CARE EDUCATION/TRAINING PROGRAM

## 2025-04-02 PROCEDURE — G2211 COMPLEX E/M VISIT ADD ON: HCPCS | Performed by: STUDENT IN AN ORGANIZED HEALTH CARE EDUCATION/TRAINING PROGRAM

## 2025-04-02 PROCEDURE — 1159F MED LIST DOCD IN RCRD: CPT | Performed by: STUDENT IN AN ORGANIZED HEALTH CARE EDUCATION/TRAINING PROGRAM

## 2025-04-02 PROCEDURE — 1123F ACP DISCUSS/DSCN MKR DOCD: CPT | Performed by: STUDENT IN AN ORGANIZED HEALTH CARE EDUCATION/TRAINING PROGRAM

## 2025-04-02 PROCEDURE — 1160F RVW MEDS BY RX/DR IN RCRD: CPT | Performed by: STUDENT IN AN ORGANIZED HEALTH CARE EDUCATION/TRAINING PROGRAM

## 2025-04-02 RX ORDER — PREDNISONE 5 MG/1
5 TABLET ORAL DAILY
Qty: 90 TABLET | Refills: 1 | Status: SHIPPED | OUTPATIENT
Start: 2025-04-02 | End: 2025-04-02 | Stop reason: SDUPTHER

## 2025-04-02 RX ORDER — PREDNISONE 5 MG/1
5 TABLET ORAL DAILY
Qty: 90 TABLET | Refills: 1 | Status: SHIPPED | OUTPATIENT
Start: 2025-04-02 | End: 2025-09-29

## 2025-04-21 ENCOUNTER — APPOINTMENT (OUTPATIENT)
Facility: CLINIC | Age: 75
End: 2025-04-21
Payer: MEDICARE

## 2025-05-12 ENCOUNTER — APPOINTMENT (OUTPATIENT)
Facility: CLINIC | Age: 75
End: 2025-05-12
Payer: MEDICARE

## 2025-05-15 ENCOUNTER — HOSPITAL ENCOUNTER (OUTPATIENT)
Dept: RESPIRATORY THERAPY | Facility: HOSPITAL | Age: 75
Discharge: HOME | End: 2025-05-15
Payer: MEDICARE

## 2025-05-15 DIAGNOSIS — J84.9 ILD (INTERSTITIAL LUNG DISEASE) (MULTI): ICD-10-CM

## 2025-05-15 PROCEDURE — 94726 PLETHYSMOGRAPHY LUNG VOLUMES: CPT | Performed by: INTERNAL MEDICINE

## 2025-05-15 PROCEDURE — 94729 DIFFUSING CAPACITY: CPT | Performed by: INTERNAL MEDICINE

## 2025-05-15 PROCEDURE — 94010 BREATHING CAPACITY TEST: CPT | Performed by: INTERNAL MEDICINE

## 2025-05-15 PROCEDURE — 94618 PULMONARY STRESS TESTING: CPT

## 2025-05-15 PROCEDURE — 94726 PLETHYSMOGRAPHY LUNG VOLUMES: CPT

## 2025-05-16 LAB
MGC ASCENT PFT - FEV1 - PRE: 2.18
MGC ASCENT PFT - FEV1 - PREDICTED: 1.94
MGC ASCENT PFT - FVC - PRE: 2.41
MGC ASCENT PFT - FVC - PREDICTED: 2.52

## 2025-06-09 ENCOUNTER — APPOINTMENT (OUTPATIENT)
Facility: CLINIC | Age: 75
End: 2025-06-09
Payer: MEDICARE

## 2025-06-18 ENCOUNTER — HOSPITAL ENCOUNTER (OUTPATIENT)
Facility: HOSPITAL | Age: 75
Setting detail: OUTPATIENT SURGERY
End: 2025-06-18
Attending: ORTHOPAEDIC SURGERY | Admitting: ORTHOPAEDIC SURGERY
Payer: MEDICARE

## 2025-06-30 NOTE — PROGRESS NOTES
Subjective   Patient ID: [id number]   Nora Bynum is a 75 y.o. female with OA of right hip, inflammatory myopathy (anti-Ku positive) with MCTD, Afib (eliquis), and vitamin D deficiency, presenting for follow up.   Previously saw Dr. Jacome and Dr. Barbosa (last visit 5/30/23), Dr. Genao in 9/23, saw me once in 10/23, then followed up with Dr. Greco for insurance purposes, kept seeing Dr. Jacome in between and now back to me    Current IS:  - Methotrexate 12.5 mg weekly plus FA 1 mg daily started 10/9/24    Previous:  - Prednisone stopped late June 2025  - MMF caused GI SE  - Myfortic, also causes GI SE, switched back to AZA  - Imuran 50mg BID, restarted in 8/24, discontinued in 10/24 due to flares       HPI  Saw cardio for atrial fibrillation, paroxysmal, following with EP Cardiology at [facility], maintained on Eliquis BID and dofetilide 250mcg daily, might get an ablation procedure  UTI -> antibiotics  Stopped prednisone on her own, she forgot it once day and decided to stay off of it, that was 2 weeks ago, no taper  OP but declining tx  Saw neurology, deferred management to us  Right hip OA, follow with ortho at [facility], considering replacement in the future  Not following up with pulm for ILD  Follows up with endo for subclinical hypothyroidism  Last PFTs in 8/24, doesn't want to do them every 6 months   No rashes, alopecia or photosensitivity  No oral or nasal ulcers  No episodes of eye inflammation  No chest pain, cough or dyspnea  No GI/ sx   No infections  Compliant with meds  No side effects reported    From previous visits:  She was seeing Dr. Greco and on Myfortic with prednisone, she was tapering down her steroids till 5 mg and had a flare of her myositis with elevated CPK, CRP and weakness as well as Afib, saw Dr. Jacome who increased her prednisone and recommended adding AZA, but pt stopped myfortic on her own due to GI upset, improved with increasing steroids  Saw pulm Dr. 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  Recommend:  PFT every 6 months  Could not tolerate myfortic  and switched back to imuran    Antifibrotic is not indicated currently  (Ofev)  Recommend annual echocardiography for pulm HTN  Saw cardio for afib, echo cardio with normal EF and no pulm HTN at [facility]   Saw general surgery for pancreatic cysts, follow up imaging in 2 years  Patient doesn't want to be switched to methotrexate due to risk of pneumonitis with her ILD    ROS  As per HPI     Rheum hx (Recall from Dr. Genao's notes):  Previously saw Dr. Jacome at [facility] since 2019. Nml CK, RF 16, ESR 47. Then saw Dr. Gomes and Elan (12/2019 GLADYS > 1:1280, negative JUSTIN, nml C3/C4, RF/CCP negative)   Sjogrens, SSA+ , Raynauds, dry mouth since 2019 June 2022, Prednisone 20mg daily 6/25/23 by Dr. Pierson for possible PMR (also hx of elevated CK up to 1411 previously), followed up with Dr. Jacome 4/2023: fatigue, muscle weakness in shoulder > hip, CK 1649, Raynauds - concern for inflammatory myopathy. EMG done by Dr. Thakur (Neurology) and recommended for muscle biopsy. She underwent MM bx 5/17/23 for Left deltoid and left rectus femoris. Pt was called by Dr. 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      She followed up with Alphonso Stanton on 5/30/23: On Pred 60mg daily and Azathioprine 150mg daily. Muscle pain improved, steorid taper by 10mg in 4 weeks, then 50mg X3 weeks, then 40mg X2 weeks. Continue Aza 150mg daily.      Pt's initial presenting sx were bilateral shoulder and leg stiffness, weakness, UE >LE. The arm weakness and limitations in activity, difficulty doing overhead activity, started  December 2022/ Jan 2023 with progressive weakness. Weakness in hip girdle.   90% improvement with steroids and AZA     + dry mouth, dry eyes, started in the more recent years  Denies fever, or current unintentional weight loss (reports some weight loss intentionally with intermittent fasting but overall has had ~100 lb weight loss over the last few years)  Denies hx of uveitis (gets eye exam annually)  Denies photosensitive rash  No hx of blood clots  Denies current difficulty swallowing.   Denies hx of seizure/CVA. + A fib (Dr. Pritchett/[facility] [place]) , has had Echo. On Dofetilide/Eliquis for A fib  Ct chest abd/pelvis was ordered for malignancy workup. She had CT chest done at [facility]. CT abd/pelvis not done yet.      FMH of autoimmune disease: Unsure  PSH: Plans for hip replacement with Dr. Killian due to OA however due to inflammatory myopathy, unable to get this done currently.   SocHX: Never smoker.     Patient Active Problem List   Diagnosis    Abnormal TSH    Edema    Herpes zoster without complication    Mild vitamin D deficiency    Myalgia, unspecified site    Myopathy    Osteoarthritis of hip    Polyarthritis    Positive Lyme disease serology    Stiffness of joints of both hands    Arthritis of shoulder    MCTD (mixed connective tissue disease) (Multi)    Lung fibrosis (Multi)    Long term (current) use of systemic steroids    Long-term use of high-risk medication    Bilateral hand pain    Chronic pain of both shoulders    Decreased range of motion of right shoulder    Dyslipidemia    Elevated blood pressure reading without diagnosis of hypertension    HyperCKemia    Nuclear senile cataract of both eyes    Obesity, Class I, BMI 30-34.9    Paroxysmal atrial fibrillation (Multi)    Osteoarthritis of both knees    Perianal abscess    Pneumonia due to COVID-19 virus    Polyarthralgia    Bilateral hip pain    Systolic dysfunction    Thyroid antibody positive    Weakness generalized    Vitamin D deficiency     Shoulder stiffness, right    Stiffness of right hand joint    Idiopathic inflammatory myopathy    Localized osteoporosis without current pathological fracture    Interstitial lung disease (Multi)    History of atrial fibrillation    Other cardiomyopathies    Primary localized osteoarthritis of pelvic region and thigh    Pulmonary hypertension (Multi)     Past Medical History:   Diagnosis Date    ILD (interstitial lung disease) (Multi)     Neuromuscular disorder (Multi)     Osteoporosis     Other specified health status     No pertinent past medical history     Past Surgical History:   Procedure Laterality Date    OTHER SURGICAL HISTORY  06/23/2020    Cyst excision     Social History     Socioeconomic History    Marital status: Single     Spouse name: Not on file    Number of children: Not on file    Years of education: Not on file    Highest education level: Not on file   Occupational History    Not on file   Tobacco Use    Smoking status: Never    Smokeless tobacco: Never   Vaping Use    Vaping status: Never Used   Substance and Sexual Activity    Alcohol use: Not Currently    Drug use: Never    Sexual activity: Defer   Other Topics Concern    Not on file   Social History Narrative    Not on file     Social Drivers of Health     Financial Resource Strain: Low Risk  (6/15/2023)    Received from [facility]    Overall Financial Resource Strain (CARDIA)     Difficulty of Paying Living Expenses: Not hard at all   Food Insecurity: No Food Insecurity (6/15/2023)    Received from [facility]    Hunger Vital Sign     Worried About Running Out of Food in the Last Year: Never true     Ran Out of Food in the Last Year: Never true   Transportation Needs: No Transportation Needs (6/15/2023)    Received from [facility]    PRAPARE - Transportation     Lack of Transportation (Medical): No     Lack of Transportation (Non-Medical): No   Physical Activity: Unknown (6/8/2022)    Received from [facility]     Exercise Vital Sign     Days of Exercise per Week: Patient declined     Minutes of Exercise per Session: Patient declined   Stress: No Stress Concern Present (6/8/2022)    Received from [facility]    [employer] - Occupational Stress Questionnaire     Feeling of Stress : Not at all   Social Connections: Moderately Integrated (6/8/2022)    Received from [facility]    Social Connection and Isolation Panel [NHANES]     Frequency of Communication with Friends and Family: More than three times a week     Frequency of Social Gatherings with Friends and Family: Patient declined     Attends [employer] Services: More than 4 times per year     Active Member of Clubs or Organizations: Yes     Attends Club or Organization Meetings: More than 4 times per year     Marital Status:    Intimate Partner Violence: Not on file   Housing Stability: Low Risk  (6/15/2023)    Received from [facility]    Housing Stability Vital Sign     Unable to Pay for Housing in the Last Year: No     Number of Places Lived in the Last Year: 1     Unstable Housing in the Last Year: No     No Known Allergies     Current Outpatient Medications:     bisoprolol (Zebeta) 5 mg tablet, Take 1.5 tablets (7.5 mg) by mouth once daily at bedtime., Disp: , Rfl:     cholecalciferol (Vitamin D3) 50 mcg (2,000 unit) capsule, Take 1 capsule (50 mcg) by mouth once daily., Disp: , Rfl:     cyanocobalamin (Vitamin B-12) 1,000 mcg tablet, Take 1 tablet (1,000 mcg) by mouth once daily., Disp: , Rfl:     dofetilide (Tikosyn) 250 mcg capsule, Take 1 capsule (250 mcg) by mouth twice a day., Disp: , Rfl:     Eliquis 5 mg tablet, Take 1 tablet (5 mg) by mouth 2 times a day., Disp: , Rfl:     fish oil concentrate (Fish OiL) 120-180 mg capsule, Take by mouth., Disp: , Rfl:     magnesium 30 mg tablet, Take 1 tablet (30 mg) by mouth once daily., Disp: , Rfl:     melatonin 5 mg tablet, Take 1 tablet (5 mg) by mouth once daily at  bedtime., Disp: , Rfl:     predniSONE (Deltasone) 5 mg tablet, Take 1 tablet (5 mg) by mouth once daily., Disp: 90 tablet, Rfl: 1    vit B complex 100 combo no.2 (B-100 COMPLEX ORAL), Take 1 capsule by mouth once daily., Disp: , Rfl:      Objective   Visit Vitals  /60   Pulse 74   Temp 36.6 °C (97.8 °F)   Resp 20     Physical Exam:  General: AAOx3, Cooperative  Head: normocephalic, atraumatic, no hair loss   Eyes: EOMI, conjunctiva clear, sclera white, anicteric, puffy lower eyelids   Neuro: CN II-XII grossly intact, no focal deficit, motor power 5/5 diffusely   Skin: No rashes, ulcers or photosensitive areas, bilateral LL varicose veins   MSK: Upper Extremities:  Hand/Fingers: No erythema, edema, tenderness or warmth at DIP, PIP, or MCP joints, FROM grossly. Good hand . No nodules. Squaring of the 1st CMCs  Wrists: No erythema, edema, warmth or tenderness at wrist, FROM grossly  Elbows: No tenderness, edema, erythema or warmth at elbows, FROM grossly. No nodules   Shoulders: No edema, erythema, tenderness or warmth at shoulders. FROM  Lower Extremities:   Hips: No obvious deformities. No joint tenderness. No trochanteric bursae TTP  Knees: No tenderness, deformities, edema, rashes, or warmth, normal ROM grossly. No crepitus, no pes anserine bursa TTP   Ankles, feet: No deformities, tenderness, edema, erythema, ulceration, or warmth at the ankle or MTP/IP joints, normal ROM grossly  Spine: No spinal tenderness to palpation. No SI joint tenderness    Lab Results   Component Value Date    WBC 5.8 06/30/2025    HGB 15.9 (H) 06/30/2025    HCT 47.9 (H) 06/30/2025    MCV 94.3 06/30/2025     06/30/2025        Chemistry    Lab Results   Component Value Date/Time     06/30/2025 1047    K 4.6 06/30/2025 1047     06/30/2025 1047    CO2 27 06/30/2025 1047    BUN 15 06/30/2025 1047    CREATININE 0.64 06/30/2025 1047    Lab Results   Component Value Date/Time    CALCIUM 9.8 06/30/2025 1047    ALKPHOS  56 06/30/2025 1047    AST 30 06/30/2025 1047    ALT 26 06/30/2025 1047    BILITOT 0.6 06/30/2025 1047        Lab Results   Component Value Date    CRP <3.0 06/30/2025      Lab Results   Component Value Date    SEDRATE 18 06/30/2025      Lab Results   Component Value Date    HEPBCAB NONREACTIVE 08/14/2023    HEPCAB NONREACTIVE 08/14/2023      Lab Results   Component Value Date    ALT 26 06/30/2025    AST 30 06/30/2025    ALKPHOS 56 06/30/2025    BILITOT 0.6 06/30/2025         === 04/26/23 ===  XR SHOULDERS  Mild degenerative changes bilateral shoulders.      CT chest 2/24:  Mild pulmonary fibrotic changes without honeycombing or bronchiectasis  Small hiatal hernia with patulous distal esophagus  Pancreatic cystic lesion in the body may be further assessed with pancreas MRI    CT abd pelvis 7/23:  1. No abdominopelvic lymphadenopathy.  2. Bilateral subpleural predominant reticular and ground-glass opacities within the bilateral lung dependent regions, likely  secondary to atelectasis, an underlying component of fibrosis cannotbe excluded.  3. Patulous distal esophagus with layering fluid, most compatible with patient's history of Sjogren syndrome.  4. Wedge-shaped low-density defects within the medial aspect of the spleen, likely reflecting chronic splenic infarct.  5. There is an indeterminate 5 mm low-density lesion within the pancreatic body. Recommend further evaluation with MRI pancreas and  MRCP.    MRI abdomen 7/23 pertinent findings:  -Pancreas: Pancreas is normal in precontrast T1 signal intensity with no solid masses or main pancreatic duct dilatation.   -Multiple unilocular cystic pancreatic lesions communicate with the main pancreatic duct are noted for example a 1.3 cm cystic structure in the   pancreatic body (3:37). 3 mm cystic lesion at the junction of the pancreatic body and tail (3:36). No worrisome imaging features.   -Liver: 1 cm right hepatic lobe cyst. No enhancing mass.   -Spleen: Wedge-shaped  area of T2 hypointense signal in the spleen may be related to prior trauma.   -Kidneys: 8 mm thinly septated cyst interpolar right kidney.      Biopsy of left deltoid and rectus femoris muscle 5/23:  INFLAMMATORY MYOPATHY    Note: Histopathologic features are qualitatively similar within the biopsy  specimens, however, they are quantitatively more severe within the rectus  femoris muscle.  The biopsies demonstrate both endomysial and  perimysial/vascular inflammation composed predominantly of CD3 positive T cells, accompanied by muscle fiber necrosis with regeneration (highlighted with antibodies to P62, CD68, and CD56).  Trichrome staining demonstrates no ragged red fibers or rimmed vacuoles, however, it highlights perimysial fibrosis in the rectus femoris muscle.  Myosin heavy chain immunostains show no fiber type grouping.  Cytochrome oxidase staining is within normal limits, and Congo red stain for amyloid is negative.     DEXA 7/23:  Lumbar spine (L1-L4): 0.886 g/cm2, T-score -2.5  Right Femoral Neck: 0.765 g/cm2, T-score -2.0  Right Total Hip: 0.656 g/cm2, T-score -2.8  Left Femoral Neck: 0.659 g/cm2, T-score -2.7  Left Total Hip: 0.729 g/cm2, T-score -2.2      Assessment/Plan    This is a 75Y F with inflammatory myopathy, MCTD with ILD, OP, vitamin D def and OA of hip, presents for follow up on myositis  Last seen in 4/25     Labs:  7/25: Hb 15.9, rest of CBC, CMP, CPK, ESR, CRP, aldolase wnl  4/25: CBC, CMP, CPK, ESR, CRP, aldolase wnl  3/25: CMP, CPK, CBC, ESR, CRP and aldolase wnl  3/25: CMP, CPK, aldolase, TSH wnl  Anti-TPO neg  1/25: ALC 0.78, K 5.6, rest of CBC, CMP, ESR, CRP, CPK, aldolase wnl  1/25: CPK wnl  12/24: CMP, ESR, CRP, CPK, aldolase wnl  12/24: ALC 0.78, rest of CBC, CMP, ESR, CRP, aldolase wnl.   11/24: , aldolase, CMP wnl  11/24: , aldolase and CMP wnl  10/24: , aldolase 9.4, AST 46, rest of CMP wnl  10/24: AST 40, , aldolase 7.9, rest of CMP wnl  9/24:  , AST 42, rest of CMP wnl  8/24: AST 40, rest of CMP, CPK, aldolase wnl  7/24: ALC 0.5, AST 42, CRP 1.9, , aldolase, ESR, rest of CBC, CMP wnl  4/24: ESR, CRP, CPK, aldolase, CBC, CMP wnl  1/24: ESR, CRP, CPK, aldolase, vitamin D, CBC, CMP wnl  12/23: CRP 1.8, ALC 0.5, rest of CBC, CMP, CPK, aldolase, ESR, C3, C4 wnl  11/23: CPK, aldolase, ESR, CRP wnl  10/23: LFTs, CPK, aldolase, CBC, CMP, ESR, CRP wnl  6/23 CK 1014 --> 435 --> 349 --> 141 --> 78 (9/23)   Aldolase 22.3 --> 8.7 --> 5.0 (9/23)  5/23: ESR, CRP wnl  4/23: SPEP no definitive M protein. Aldolase 27.3, CK 1649  GLADYS 1:2560, chrom >8  4/23: TPMT 22.4, Tspot TB negative. HBsAg Negative  Extended myositis panel shows + anti- Ku     Imaging:  DEXA 11/24:  Lumbar Spine (L1, L2, L3, L4): Total BMD: 0.879  g/cm2, T-score: -2.5  Right Femoral Neck: 0.774  g/cm2 , T-score -1.9  Right Total Hip: 0.681  g/cm2 , T-score -2.6  Left Femoral Neck: 0.699  g/cm2 , T-score -2.4  Left Total Hip:  0.772  g/cm2 ,   No FRAX      # Inflammatory myopathy, Extended myositis panel +Ku, MM biopsy CK and aldolase trending down, sx were recurring with tapering of pred to 16.5mg  Extensive discussion with the patient about her disease and treatment options  Will stop Imuran (uptredning CPK after 3 months of tx), pt would like to start methotrexate although she is worried about the risk of methotrexate induced pneumonitis  All of her questions were answered   Doing well now on methotrexate, off steroids   # MCTD: inflammatory myopathy, Sjogrens (SSA+, GLADYS +, sicca symptoms), Chr + , Raynauds  # Long term systemic steroids  # OP: Repeat DEXA done at [facility] (11/24- reviewed results- pt has OP worse T score -2.5 of the L spine, on vitamin D and calcium, does not want to add therapies now, would like to have better nutrition first, she was losing weight before and couldn't take her vitamins - briefly discussed BP     - Continue methotrexate 12.5 mg weekly plus daily FA   - Close  follow up for any pulm sx   - Off prednisone, without taper, Cortisl AM level tomorrow   - Labs (CK, aldolase, CBC, CMP, ESR, CRP) standing orders   - Although anti Ku does not carry a significant associated risk of malignancy- age related cancer screening encouraged. Pt declines Mammogram. Reports no prior colonoscopy but does annual FOBT. SPEP no monoclonal gammopathy  - Follow up with pulm Dr. Duncan for CT chest, PFTs and echocardio  - Referred to physical therapy for muscle strengthening   - Saw surgery for pancreas and recommended monitoring, repeat MR in 2 years   - Follow up with cardio for Afib, recent echo is normal , getting ablation soon   - Has had prevnar, shingles and covid vaccinations  - Vitamin D 2000 international units daily   - OP management deferred for now, pt refusing tx   - Consider IVIg if needed, pt would like to defer it for now     RTC in 4-5  months     Plan, including risks and benefits, was discussed with the patient, informed on how to reach us.     To schedule an appointment, call [phone]  To reach the rheumatology office, call [phone]    Vikki Duckworth MD   Division of Rheumatology  [facility]

## 2025-07-02 LAB
ALBUMIN SERPL-MCNC: 4.3 G/DL (ref 3.6–5.1)
ALDOLASE SERPL-CCNC: 2.6 U/L
ALP SERPL-CCNC: 56 U/L (ref 37–153)
ALT SERPL-CCNC: 26 U/L (ref 6–29)
ANION GAP SERPL CALCULATED.4IONS-SCNC: 8 MMOL/L (CALC) (ref 7–17)
AST SERPL-CCNC: 30 U/L (ref 10–35)
BASOPHILS # BLD AUTO: 41 CELLS/UL (ref 0–200)
BASOPHILS NFR BLD AUTO: 0.7 %
BILIRUB SERPL-MCNC: 0.6 MG/DL (ref 0.2–1.2)
BUN SERPL-MCNC: 15 MG/DL (ref 7–25)
CALCIUM SERPL-MCNC: 9.8 MG/DL (ref 8.6–10.4)
CHLORIDE SERPL-SCNC: 107 MMOL/L (ref 98–110)
CK SERPL-CCNC: 102 U/L (ref 18–225)
CO2 SERPL-SCNC: 27 MMOL/L (ref 20–32)
CREAT SERPL-MCNC: 0.64 MG/DL (ref 0.6–1)
CRP SERPL-MCNC: <3 MG/L
EGFRCR SERPLBLD CKD-EPI 2021: 92 ML/MIN/1.73M2
EOSINOPHIL # BLD AUTO: 81 CELLS/UL (ref 15–500)
EOSINOPHIL NFR BLD AUTO: 1.4 %
ERYTHROCYTE [DISTWIDTH] IN BLOOD BY AUTOMATED COUNT: 13.8 % (ref 11–15)
ERYTHROCYTE [SEDIMENTATION RATE] IN BLOOD BY WESTERGREN METHOD: 18 MM/H
GLUCOSE SERPL-MCNC: 84 MG/DL (ref 65–99)
HCT VFR BLD AUTO: 47.9 % (ref 35–45)
HGB BLD-MCNC: 15.9 G/DL (ref 11.7–15.5)
LYMPHOCYTES # BLD AUTO: 1177 CELLS/UL (ref 850–3900)
LYMPHOCYTES NFR BLD AUTO: 20.3 %
MCH RBC QN AUTO: 31.3 PG (ref 27–33)
MCHC RBC AUTO-ENTMCNC: 33.2 G/DL (ref 32–36)
MCV RBC AUTO: 94.3 FL (ref 80–100)
MONOCYTES # BLD AUTO: 487 CELLS/UL (ref 200–950)
MONOCYTES NFR BLD AUTO: 8.4 %
NEUTROPHILS # BLD AUTO: 4014 CELLS/UL (ref 1500–7800)
NEUTROPHILS NFR BLD AUTO: 69.2 %
PLATELET # BLD AUTO: 252 THOUSAND/UL (ref 140–400)
PMV BLD REES-ECKER: 9.3 FL (ref 7.5–12.5)
POTASSIUM SERPL-SCNC: 4.6 MMOL/L (ref 3.5–5.3)
PROT SERPL-MCNC: 7 G/DL (ref 6.1–8.1)
RBC # BLD AUTO: 5.08 MILLION/UL (ref 3.8–5.1)
SODIUM SERPL-SCNC: 142 MMOL/L (ref 135–146)
WBC # BLD AUTO: 5.8 THOUSAND/UL (ref 3.8–10.8)

## 2025-07-07 ENCOUNTER — APPOINTMENT (OUTPATIENT)
Dept: RHEUMATOLOGY | Facility: CLINIC | Age: 75
End: 2025-07-07
Payer: MEDICARE

## 2025-07-07 VITALS
RESPIRATION RATE: 20 BRPM | TEMPERATURE: 97.8 F | SYSTOLIC BLOOD PRESSURE: 124 MMHG | HEART RATE: 74 BPM | WEIGHT: 202 LBS | BODY MASS INDEX: 35.78 KG/M2 | DIASTOLIC BLOOD PRESSURE: 60 MMHG

## 2025-07-07 DIAGNOSIS — M81.6 LOCALIZED OSTEOPOROSIS WITHOUT CURRENT PATHOLOGICAL FRACTURE: ICD-10-CM

## 2025-07-07 DIAGNOSIS — M19.041 ARTHRITIS OF BOTH HANDS: ICD-10-CM

## 2025-07-07 DIAGNOSIS — Z79.624 ENCOUNTER FOR LONG TERM CURRENT AZATHIOPRINE THERAPY: ICD-10-CM

## 2025-07-07 DIAGNOSIS — M35.1 MCTD (MIXED CONNECTIVE TISSUE DISEASE) (MULTI): ICD-10-CM

## 2025-07-07 DIAGNOSIS — M19.042 ARTHRITIS OF BOTH HANDS: ICD-10-CM

## 2025-07-07 DIAGNOSIS — M33.20 POLYMYOSITIS: ICD-10-CM

## 2025-07-07 DIAGNOSIS — G72.49 IDIOPATHIC INFLAMMATORY MYOPATHY: Primary | ICD-10-CM

## 2025-07-07 DIAGNOSIS — Z79.52 LONG TERM (CURRENT) USE OF SYSTEMIC STEROIDS: ICD-10-CM

## 2025-07-07 DIAGNOSIS — M15.0 PRIMARY OSTEOARTHRITIS INVOLVING MULTIPLE JOINTS: ICD-10-CM

## 2025-07-07 DIAGNOSIS — M16.11 PRIMARY OSTEOARTHRITIS OF RIGHT HIP: ICD-10-CM

## 2025-07-07 DIAGNOSIS — J84.9 ILD (INTERSTITIAL LUNG DISEASE) (MULTI): ICD-10-CM

## 2025-07-07 DIAGNOSIS — E55.9 VITAMIN D DEFICIENCY: ICD-10-CM

## 2025-07-07 DIAGNOSIS — Z79.631 ON METHOTREXATE THERAPY: ICD-10-CM

## 2025-07-07 PROCEDURE — 1159F MED LIST DOCD IN RCRD: CPT | Performed by: STUDENT IN AN ORGANIZED HEALTH CARE EDUCATION/TRAINING PROGRAM

## 2025-07-07 PROCEDURE — G2211 COMPLEX E/M VISIT ADD ON: HCPCS | Performed by: STUDENT IN AN ORGANIZED HEALTH CARE EDUCATION/TRAINING PROGRAM

## 2025-07-07 PROCEDURE — 1160F RVW MEDS BY RX/DR IN RCRD: CPT | Performed by: STUDENT IN AN ORGANIZED HEALTH CARE EDUCATION/TRAINING PROGRAM

## 2025-07-07 PROCEDURE — 99214 OFFICE O/P EST MOD 30 MIN: CPT | Performed by: STUDENT IN AN ORGANIZED HEALTH CARE EDUCATION/TRAINING PROGRAM

## 2025-07-25 ENCOUNTER — APPOINTMENT (OUTPATIENT)
Dept: CARDIOLOGY | Facility: CLINIC | Age: 75
End: 2025-07-25
Payer: MEDICARE

## 2025-08-11 PROBLEM — R03.0 ELEVATED BLOOD PRESSURE READING WITHOUT DIAGNOSIS OF HYPERTENSION: Status: RESOLVED | Noted: 2017-09-11 | Resolved: 2025-08-11

## 2025-08-11 PROBLEM — E66.812 CLASS 2 OBESITY WITH BODY MASS INDEX (BMI) OF 35.0 TO 35.9 IN ADULT: Status: ACTIVE | Noted: 2022-04-13

## 2025-08-18 ENCOUNTER — APPOINTMENT (OUTPATIENT)
Facility: CLINIC | Age: 75
End: 2025-08-18
Payer: MEDICARE

## 2025-08-18 VITALS
SYSTOLIC BLOOD PRESSURE: 125 MMHG | RESPIRATION RATE: 18 BRPM | TEMPERATURE: 97.5 F | OXYGEN SATURATION: 97 % | HEART RATE: 61 BPM | WEIGHT: 201.2 LBS | BODY MASS INDEX: 35.65 KG/M2 | HEIGHT: 63 IN | DIASTOLIC BLOOD PRESSURE: 83 MMHG

## 2025-08-18 DIAGNOSIS — Q99.8 MYOSITIS ASSOCIATED ANTIBODY POSITIVE: ICD-10-CM

## 2025-08-18 DIAGNOSIS — J84.89 NSIP (NONSPECIFIC INTERSTITIAL PNEUMONIA) (MULTI): ICD-10-CM

## 2025-08-18 DIAGNOSIS — R51.9 NEW ONSET OF HEADACHE IN IMMUNOCOMPROMISED PATIENT: ICD-10-CM

## 2025-08-18 DIAGNOSIS — J84.9 ILD (INTERSTITIAL LUNG DISEASE) (MULTI): Primary | ICD-10-CM

## 2025-08-18 DIAGNOSIS — D84.9 NEW ONSET OF HEADACHE IN IMMUNOCOMPROMISED PATIENT: ICD-10-CM

## 2025-08-18 PROCEDURE — 1036F TOBACCO NON-USER: CPT | Performed by: INTERNAL MEDICINE

## 2025-08-18 PROCEDURE — G2211 COMPLEX E/M VISIT ADD ON: HCPCS | Performed by: INTERNAL MEDICINE

## 2025-08-18 PROCEDURE — 1159F MED LIST DOCD IN RCRD: CPT | Performed by: INTERNAL MEDICINE

## 2025-08-18 PROCEDURE — 99214 OFFICE O/P EST MOD 30 MIN: CPT | Performed by: INTERNAL MEDICINE

## 2025-08-18 RX ORDER — METHOTREXATE 2.5 MG/1
TABLET ORAL WEEKLY
COMMUNITY

## 2025-08-18 ASSESSMENT — COLUMBIA-SUICIDE SEVERITY RATING SCALE - C-SSRS
2. HAVE YOU ACTUALLY HAD ANY THOUGHTS OF KILLING YOURSELF?: NO
1. IN THE PAST MONTH, HAVE YOU WISHED YOU WERE DEAD OR WISHED YOU COULD GO TO SLEEP AND NOT WAKE UP?: NO
6. HAVE YOU EVER DONE ANYTHING, STARTED TO DO ANYTHING, OR PREPARED TO DO ANYTHING TO END YOUR LIFE?: NO

## 2025-08-18 ASSESSMENT — ENCOUNTER SYMPTOMS
OCCASIONAL FEELINGS OF UNSTEADINESS: 0
DEPRESSION: 0
LOSS OF SENSATION IN FEET: 1

## 2025-09-02 ENCOUNTER — OFFICE VISIT (OUTPATIENT)
Dept: CARDIOLOGY | Facility: CLINIC | Age: 75
End: 2025-09-02
Payer: MEDICARE

## 2025-09-02 VITALS
SYSTOLIC BLOOD PRESSURE: 144 MMHG | HEART RATE: 55 BPM | OXYGEN SATURATION: 95 % | BODY MASS INDEX: 35.61 KG/M2 | DIASTOLIC BLOOD PRESSURE: 88 MMHG | WEIGHT: 201 LBS

## 2025-09-02 DIAGNOSIS — I48.91 ATRIAL FIBRILLATION, UNSPECIFIED TYPE (MULTI): ICD-10-CM

## 2025-09-02 LAB
ATRIAL RATE: 55 BPM
P AXIS: 75 DEGREES
P OFFSET: 159 MS
P ONSET: 114 MS
PR INTERVAL: 188 MS
Q ONSET: 208 MS
QRS COUNT: 9 BEATS
QRS DURATION: 110 MS
QT INTERVAL: 492 MS
QTC CALCULATION(BAZETT): 470 MS
QTC FREDERICIA: 478 MS
R AXIS: -67 DEGREES
T AXIS: 30 DEGREES
T OFFSET: 454 MS
VENTRICULAR RATE: 55 BPM

## 2025-09-02 PROCEDURE — 99205 OFFICE O/P NEW HI 60 MIN: CPT | Performed by: STUDENT IN AN ORGANIZED HEALTH CARE EDUCATION/TRAINING PROGRAM

## 2025-09-02 PROCEDURE — 1159F MED LIST DOCD IN RCRD: CPT | Performed by: STUDENT IN AN ORGANIZED HEALTH CARE EDUCATION/TRAINING PROGRAM

## 2025-09-02 PROCEDURE — 93005 ELECTROCARDIOGRAM TRACING: CPT | Performed by: STUDENT IN AN ORGANIZED HEALTH CARE EDUCATION/TRAINING PROGRAM

## 2025-09-02 PROCEDURE — G2211 COMPLEX E/M VISIT ADD ON: HCPCS | Performed by: STUDENT IN AN ORGANIZED HEALTH CARE EDUCATION/TRAINING PROGRAM

## 2025-09-09 ENCOUNTER — APPOINTMENT (OUTPATIENT)
Facility: CLINIC | Age: 75
End: 2025-09-09
Payer: MEDICARE

## 2025-09-16 ENCOUNTER — APPOINTMENT (OUTPATIENT)
Dept: PRIMARY CARE | Facility: CLINIC | Age: 75
End: 2025-09-16
Payer: MEDICARE

## 2025-09-23 ENCOUNTER — APPOINTMENT (OUTPATIENT)
Dept: PRIMARY CARE | Facility: CLINIC | Age: 75
End: 2025-09-23
Payer: MEDICARE

## 2025-10-23 ENCOUNTER — APPOINTMENT (OUTPATIENT)
Dept: NEUROLOGY | Facility: CLINIC | Age: 75
End: 2025-10-23
Payer: MEDICARE

## 2025-11-17 ENCOUNTER — APPOINTMENT (OUTPATIENT)
Dept: RHEUMATOLOGY | Facility: CLINIC | Age: 75
End: 2025-11-17
Payer: MEDICARE

## 2025-12-22 ENCOUNTER — APPOINTMENT (OUTPATIENT)
Dept: RHEUMATOLOGY | Facility: CLINIC | Age: 75
End: 2025-12-22
Payer: MEDICARE

## 2026-01-26 ENCOUNTER — APPOINTMENT (OUTPATIENT)
Facility: CLINIC | Age: 76
End: 2026-01-26
Payer: MEDICARE